# Patient Record
Sex: MALE | Race: WHITE | NOT HISPANIC OR LATINO | Employment: FULL TIME | URBAN - METROPOLITAN AREA
[De-identification: names, ages, dates, MRNs, and addresses within clinical notes are randomized per-mention and may not be internally consistent; named-entity substitution may affect disease eponyms.]

---

## 2017-05-06 ENCOUNTER — ALLSCRIPTS OFFICE VISIT (OUTPATIENT)
Dept: OTHER | Facility: OTHER | Age: 67
End: 2017-05-06

## 2017-05-06 DIAGNOSIS — M54.9 DORSALGIA: ICD-10-CM

## 2017-05-06 DIAGNOSIS — I10 ESSENTIAL (PRIMARY) HYPERTENSION: ICD-10-CM

## 2017-05-06 DIAGNOSIS — D64.9 ANEMIA: ICD-10-CM

## 2017-05-06 DIAGNOSIS — I51.7 CARDIOMEGALY: ICD-10-CM

## 2017-05-06 DIAGNOSIS — R07.9 CHEST PAIN: ICD-10-CM

## 2017-05-06 DIAGNOSIS — M54.10 RADICULOPATHY: ICD-10-CM

## 2017-05-06 DIAGNOSIS — R07.89 OTHER CHEST PAIN: ICD-10-CM

## 2017-05-06 DIAGNOSIS — R10.13 EPIGASTRIC PAIN: ICD-10-CM

## 2017-05-08 ENCOUNTER — LAB CONVERSION - ENCOUNTER (OUTPATIENT)
Dept: OTHER | Facility: OTHER | Age: 67
End: 2017-05-08

## 2017-05-08 LAB
A/G RATIO (HISTORICAL): 1.6 (CALC) (ref 1–2.5)
ALBUMIN SERPL BCP-MCNC: 4.2 G/DL (ref 3.6–5.1)
ALP SERPL-CCNC: 71 U/L (ref 40–115)
ALT SERPL W P-5'-P-CCNC: 34 U/L (ref 9–46)
AST SERPL W P-5'-P-CCNC: 23 U/L (ref 10–35)
BASOPHILS # BLD AUTO: 0.3 %
BASOPHILS # BLD AUTO: 12 CELLS/UL (ref 0–200)
BILIRUB SERPL-MCNC: 0.9 MG/DL (ref 0.2–1.2)
BUN SERPL-MCNC: 17 MG/DL (ref 7–25)
BUN/CREA RATIO (HISTORICAL): ABNORMAL (CALC) (ref 6–22)
CALCIUM SERPL-MCNC: 9 MG/DL (ref 8.6–10.3)
CHLORIDE SERPL-SCNC: 106 MMOL/L (ref 98–110)
CHOLEST SERPL-MCNC: 182 MG/DL (ref 125–200)
CHOLEST/HDLC SERPL: 4 (CALC)
CO2 SERPL-SCNC: 27 MMOL/L (ref 20–31)
CREAT SERPL-MCNC: 0.98 MG/DL (ref 0.7–1.25)
DEPRECATED RDW RBC AUTO: 13.8 % (ref 11–15)
EGFR AFRICAN AMERICAN (HISTORICAL): 91 ML/MIN/1.73M2
EGFR-AMERICAN CALC (HISTORICAL): 78 ML/MIN/1.73M2
EOSINOPHIL # BLD AUTO: 0.9 %
EOSINOPHIL # BLD AUTO: 37 CELLS/UL (ref 15–500)
GAMMA GLOBULIN (HISTORICAL): 2.7 G/DL (CALC) (ref 1.9–3.7)
GLUCOSE (HISTORICAL): 124 MG/DL (ref 65–99)
HBA1C MFR BLD HPLC: 5.4 % OF TOTAL HGB
HCT VFR BLD AUTO: 40.3 % (ref 38.5–50)
HDLC SERPL-MCNC: 45 MG/DL
HGB BLD-MCNC: 13.6 G/DL (ref 13.2–17.1)
LDL CHOLESTEROL (HISTORICAL): 122 MG/DL (CALC)
LIPASE SERPL-CCNC: 31 U/L (ref 7–60)
LYMPHOCYTES # BLD AUTO: 1644 CELLS/UL (ref 850–3900)
LYMPHOCYTES # BLD AUTO: 40.1 %
MCH RBC QN AUTO: 32 PG (ref 27–33)
MCHC RBC AUTO-ENTMCNC: 33.7 G/DL (ref 32–36)
MCV RBC AUTO: 94.9 FL (ref 80–100)
MONOCYTES # BLD AUTO: 344 CELLS/UL (ref 200–950)
MONOCYTES (HISTORICAL): 8.4 %
NEUTROPHILS # BLD AUTO: 2062 CELLS/UL (ref 1500–7800)
NEUTROPHILS # BLD AUTO: 50.3 %
NON-HDL-CHOL (CHOL-HDL) (HISTORICAL): 137 MG/DL (CALC)
PLATELET # BLD AUTO: 128 THOUSAND/UL (ref 140–400)
PLATELET # BLD AUTO: ABNORMAL 10*3/UL
PMV BLD AUTO: 9.9 FL (ref 7.5–12.5)
POTASSIUM SERPL-SCNC: 4.2 MMOL/L (ref 3.5–5.3)
RBC # BLD AUTO: 4.25 MILLION/UL (ref 4.2–5.8)
SODIUM SERPL-SCNC: 140 MMOL/L (ref 135–146)
TOTAL PROTEIN (HISTORICAL): 6.9 G/DL (ref 6.1–8.1)
TRIGL SERPL-MCNC: 73 MG/DL
TSH SERPL DL<=0.05 MIU/L-ACNC: 2.28 MIU/L (ref 0.4–4.5)
WBC # BLD AUTO: 4.1 THOUSAND/UL (ref 3.8–10.8)

## 2017-05-25 ENCOUNTER — GENERIC CONVERSION - ENCOUNTER (OUTPATIENT)
Dept: OTHER | Facility: OTHER | Age: 67
End: 2017-05-25

## 2017-05-25 ENCOUNTER — APPOINTMENT (OUTPATIENT)
Dept: PHYSICAL THERAPY | Facility: REHABILITATION | Age: 67
End: 2017-05-25
Payer: COMMERCIAL

## 2017-05-25 DIAGNOSIS — M54.10 RADICULOPATHY: ICD-10-CM

## 2017-05-25 DIAGNOSIS — M54.9 DORSALGIA: ICD-10-CM

## 2017-05-25 PROCEDURE — 97110 THERAPEUTIC EXERCISES: CPT

## 2017-05-25 PROCEDURE — 97162 PT EVAL MOD COMPLEX 30 MIN: CPT

## 2017-06-02 ENCOUNTER — TRANSCRIBE ORDERS (OUTPATIENT)
Dept: ADMINISTRATIVE | Facility: HOSPITAL | Age: 67
End: 2017-06-02

## 2017-06-02 ENCOUNTER — ALLSCRIPTS OFFICE VISIT (OUTPATIENT)
Dept: OTHER | Facility: OTHER | Age: 67
End: 2017-06-02

## 2017-06-02 ENCOUNTER — HOSPITAL ENCOUNTER (OUTPATIENT)
Dept: RADIOLOGY | Facility: HOSPITAL | Age: 67
Discharge: HOME | End: 2017-06-02
Payer: COMMERCIAL

## 2017-06-02 DIAGNOSIS — M54.10 RADICULOPATHY: ICD-10-CM

## 2017-06-02 DIAGNOSIS — M54.9 DORSALGIA: ICD-10-CM

## 2017-06-02 PROCEDURE — 72050 X-RAY EXAM NECK SPINE 4/5VWS: CPT

## 2017-06-05 ENCOUNTER — APPOINTMENT (OUTPATIENT)
Dept: PHYSICAL THERAPY | Facility: REHABILITATION | Age: 67
End: 2017-06-05
Payer: COMMERCIAL

## 2017-06-05 PROCEDURE — 97110 THERAPEUTIC EXERCISES: CPT

## 2017-06-06 ENCOUNTER — GENERIC CONVERSION - ENCOUNTER (OUTPATIENT)
Dept: OTHER | Facility: OTHER | Age: 67
End: 2017-06-06

## 2017-06-08 ENCOUNTER — APPOINTMENT (OUTPATIENT)
Dept: PHYSICAL THERAPY | Facility: REHABILITATION | Age: 67
End: 2017-06-08
Payer: COMMERCIAL

## 2017-06-08 PROCEDURE — 97110 THERAPEUTIC EXERCISES: CPT

## 2017-06-12 ENCOUNTER — APPOINTMENT (OUTPATIENT)
Dept: PHYSICAL THERAPY | Facility: REHABILITATION | Age: 67
End: 2017-06-12
Payer: COMMERCIAL

## 2017-06-12 PROCEDURE — 97110 THERAPEUTIC EXERCISES: CPT

## 2017-06-12 PROCEDURE — 97140 MANUAL THERAPY 1/> REGIONS: CPT

## 2017-06-15 ENCOUNTER — APPOINTMENT (OUTPATIENT)
Dept: PHYSICAL THERAPY | Facility: REHABILITATION | Age: 67
End: 2017-06-15
Payer: COMMERCIAL

## 2017-06-15 PROCEDURE — 97140 MANUAL THERAPY 1/> REGIONS: CPT

## 2017-06-15 PROCEDURE — 97110 THERAPEUTIC EXERCISES: CPT

## 2017-06-19 ENCOUNTER — APPOINTMENT (OUTPATIENT)
Dept: PHYSICAL THERAPY | Facility: REHABILITATION | Age: 67
End: 2017-06-19
Payer: COMMERCIAL

## 2017-06-19 PROCEDURE — 97110 THERAPEUTIC EXERCISES: CPT

## 2017-06-19 PROCEDURE — 97140 MANUAL THERAPY 1/> REGIONS: CPT

## 2017-06-20 ENCOUNTER — HOSPITAL ENCOUNTER (OUTPATIENT)
Dept: RADIOLOGY | Facility: HOSPITAL | Age: 67
Discharge: HOME | End: 2017-06-20
Payer: COMMERCIAL

## 2017-06-20 DIAGNOSIS — I51.7 CARDIOMEGALY: ICD-10-CM

## 2017-06-20 DIAGNOSIS — D64.9 ANEMIA: ICD-10-CM

## 2017-06-20 DIAGNOSIS — R07.9 CHEST PAIN: ICD-10-CM

## 2017-06-20 DIAGNOSIS — I10 ESSENTIAL (PRIMARY) HYPERTENSION: ICD-10-CM

## 2017-06-20 DIAGNOSIS — R07.89 OTHER CHEST PAIN: ICD-10-CM

## 2017-06-20 DIAGNOSIS — R10.13 EPIGASTRIC PAIN: ICD-10-CM

## 2017-06-20 PROCEDURE — 76706 US ABDL AORTA SCREEN AAA: CPT

## 2017-06-22 ENCOUNTER — APPOINTMENT (OUTPATIENT)
Dept: PHYSICAL THERAPY | Facility: REHABILITATION | Age: 67
End: 2017-06-22
Payer: COMMERCIAL

## 2017-06-22 PROCEDURE — 97110 THERAPEUTIC EXERCISES: CPT

## 2017-06-22 PROCEDURE — 97140 MANUAL THERAPY 1/> REGIONS: CPT

## 2017-06-23 ENCOUNTER — GENERIC CONVERSION - ENCOUNTER (OUTPATIENT)
Dept: OTHER | Facility: OTHER | Age: 67
End: 2017-06-23

## 2017-06-27 ENCOUNTER — GENERIC CONVERSION - ENCOUNTER (OUTPATIENT)
Dept: OTHER | Facility: OTHER | Age: 67
End: 2017-06-27

## 2017-06-29 ENCOUNTER — APPOINTMENT (OUTPATIENT)
Dept: PHYSICAL THERAPY | Facility: REHABILITATION | Age: 67
End: 2017-06-29
Payer: COMMERCIAL

## 2017-06-29 PROCEDURE — 97110 THERAPEUTIC EXERCISES: CPT

## 2017-06-29 PROCEDURE — 97112 NEUROMUSCULAR REEDUCATION: CPT

## 2017-06-29 PROCEDURE — 97140 MANUAL THERAPY 1/> REGIONS: CPT

## 2017-07-03 ENCOUNTER — APPOINTMENT (OUTPATIENT)
Dept: PHYSICAL THERAPY | Facility: REHABILITATION | Age: 67
End: 2017-07-03
Payer: COMMERCIAL

## 2017-07-03 ENCOUNTER — ALLSCRIPTS OFFICE VISIT (OUTPATIENT)
Dept: OTHER | Facility: OTHER | Age: 67
End: 2017-07-03

## 2017-07-03 PROCEDURE — 97110 THERAPEUTIC EXERCISES: CPT

## 2017-07-03 PROCEDURE — 97140 MANUAL THERAPY 1/> REGIONS: CPT

## 2017-07-05 ENCOUNTER — APPOINTMENT (OUTPATIENT)
Dept: PHYSICAL THERAPY | Facility: REHABILITATION | Age: 67
End: 2017-07-05
Payer: COMMERCIAL

## 2017-07-05 PROCEDURE — 97140 MANUAL THERAPY 1/> REGIONS: CPT

## 2017-07-05 PROCEDURE — 97110 THERAPEUTIC EXERCISES: CPT

## 2017-07-07 ENCOUNTER — GENERIC CONVERSION - ENCOUNTER (OUTPATIENT)
Dept: OTHER | Facility: OTHER | Age: 67
End: 2017-07-07

## 2017-08-23 ENCOUNTER — GENERIC CONVERSION - ENCOUNTER (OUTPATIENT)
Dept: OTHER | Facility: OTHER | Age: 67
End: 2017-08-23

## 2017-09-22 ENCOUNTER — GENERIC CONVERSION - ENCOUNTER (OUTPATIENT)
Dept: OTHER | Facility: OTHER | Age: 67
End: 2017-09-22

## 2017-10-27 ENCOUNTER — ALLSCRIPTS OFFICE VISIT (OUTPATIENT)
Dept: OTHER | Facility: OTHER | Age: 67
End: 2017-10-27

## 2017-10-27 ENCOUNTER — GENERIC CONVERSION - ENCOUNTER (OUTPATIENT)
Dept: OTHER | Facility: OTHER | Age: 67
End: 2017-10-27

## 2017-10-27 ENCOUNTER — GENERIC CONVERSION - ENCOUNTER (OUTPATIENT)
Dept: FAMILY MEDICINE CLINIC | Facility: CLINIC | Age: 67
End: 2017-10-27

## 2017-10-27 DIAGNOSIS — R73.01 IMPAIRED FASTING GLUCOSE: ICD-10-CM

## 2017-10-27 DIAGNOSIS — G47.9 SLEEP DISORDER: ICD-10-CM

## 2017-10-27 DIAGNOSIS — I10 ESSENTIAL (PRIMARY) HYPERTENSION: ICD-10-CM

## 2017-10-27 DIAGNOSIS — R06.81 APNEA, NOT ELSEWHERE CLASSIFIED: ICD-10-CM

## 2017-10-27 DIAGNOSIS — I44.7 LEFT BUNDLE-BRANCH BLOCK: ICD-10-CM

## 2017-10-28 ENCOUNTER — LAB CONVERSION - ENCOUNTER (OUTPATIENT)
Dept: OTHER | Facility: OTHER | Age: 67
End: 2017-10-28

## 2017-10-28 LAB
A/G RATIO (HISTORICAL): 1.8 (CALC) (ref 1–2.5)
ALBUMIN SERPL BCP-MCNC: 4.7 G/DL (ref 3.6–5.1)
ALP SERPL-CCNC: 70 U/L (ref 40–115)
ALT SERPL W P-5'-P-CCNC: 32 U/L (ref 9–46)
AST SERPL W P-5'-P-CCNC: 24 U/L (ref 10–35)
BASOPHILS # BLD AUTO: 0.3 %
BASOPHILS # BLD AUTO: 12 CELLS/UL (ref 0–200)
BILIRUB SERPL-MCNC: 0.8 MG/DL (ref 0.2–1.2)
BUN SERPL-MCNC: 11 MG/DL (ref 7–25)
BUN/CREA RATIO (HISTORICAL): ABNORMAL (CALC) (ref 6–22)
CALCIUM SERPL-MCNC: 9.1 MG/DL (ref 8.6–10.3)
CHLORIDE SERPL-SCNC: 105 MMOL/L (ref 98–110)
CHOLEST SERPL-MCNC: 113 MG/DL
CHOLEST/HDLC SERPL: 2.3 (CALC)
CO2 SERPL-SCNC: 27 MMOL/L (ref 20–31)
CREAT SERPL-MCNC: 0.83 MG/DL (ref 0.7–1.25)
DEPRECATED RDW RBC AUTO: 13.5 % (ref 11–15)
EGFR AFRICAN AMERICAN (HISTORICAL): 104 ML/MIN/1.73M2
EGFR-AMERICAN CALC (HISTORICAL): 90 ML/MIN/1.73M2
EOSINOPHIL # BLD AUTO: 1.3 %
EOSINOPHIL # BLD AUTO: 52 CELLS/UL (ref 15–500)
GAMMA GLOBULIN (HISTORICAL): 2.6 G/DL (CALC) (ref 1.9–3.7)
GLUCOSE (HISTORICAL): 146 MG/DL (ref 65–99)
HBA1C MFR BLD HPLC: 6.2 % OF TOTAL HGB
HCT VFR BLD AUTO: 38.5 % (ref 38.5–50)
HDLC SERPL-MCNC: 50 MG/DL
HGB BLD-MCNC: 13.2 G/DL (ref 13.2–17.1)
LDL CHOLESTEROL (HISTORICAL): 49 MG/DL (CALC)
LYMPHOCYTES # BLD AUTO: 1252 CELLS/UL (ref 850–3900)
LYMPHOCYTES # BLD AUTO: 31.3 %
MCH RBC QN AUTO: 31.8 PG (ref 27–33)
MCHC RBC AUTO-ENTMCNC: 34.3 G/DL (ref 32–36)
MCV RBC AUTO: 92.8 FL (ref 80–100)
MONOCYTES # BLD AUTO: 364 CELLS/UL (ref 200–950)
MONOCYTES (HISTORICAL): 9.1 %
NEUTROPHILS # BLD AUTO: 2320 CELLS/UL (ref 1500–7800)
NEUTROPHILS # BLD AUTO: 58 %
NON-HDL-CHOL (CHOL-HDL) (HISTORICAL): 63 MG/DL (CALC)
PLATELET # BLD AUTO: 143 THOUSAND/UL (ref 140–400)
PMV BLD AUTO: 10.8 FL (ref 7.5–12.5)
POTASSIUM SERPL-SCNC: 4.1 MMOL/L (ref 3.5–5.3)
PROSTATE SPECIFIC ANTIGEN TOTAL (HISTORICAL): 1.2 NG/ML
RBC # BLD AUTO: 4.15 MILLION/UL (ref 4.2–5.8)
SODIUM SERPL-SCNC: 140 MMOL/L (ref 135–146)
TOTAL PROTEIN (HISTORICAL): 7.3 G/DL (ref 6.1–8.1)
TRIGL SERPL-MCNC: 65 MG/DL
TSH SERPL DL<=0.05 MIU/L-ACNC: 2.78 MIU/L (ref 0.4–4.5)
WBC # BLD AUTO: 4 THOUSAND/UL (ref 3.8–10.8)

## 2017-11-30 ENCOUNTER — TRANSCRIBE ORDERS (OUTPATIENT)
Dept: ADMINISTRATIVE | Facility: HOSPITAL | Age: 67
End: 2017-11-30

## 2017-11-30 DIAGNOSIS — R06.02 SOB (SHORTNESS OF BREATH): ICD-10-CM

## 2017-11-30 DIAGNOSIS — R06.83 SNORING: Primary | ICD-10-CM

## 2017-12-01 ENCOUNTER — ALLSCRIPTS OFFICE VISIT (OUTPATIENT)
Dept: OTHER | Facility: OTHER | Age: 67
End: 2017-12-01

## 2017-12-05 NOTE — CONSULTS
Assessment    1  Sickle cell trait (282 5) (D57 3)    Plan  Sickle cell trait    · Follow-up PRN Evaluation and Treatment  Follow-up  Status: Complete  Done:[id number]   Ordered;Sickle cell trait; Ordered By: Renzo Santiago Performed:  Due: 68LHK0087    Discussion/Summary    Sofia Burgess is a 69year old gentleman of [facility] seen for initial consultation 2017 at the referral of Carlota Leblanc regarding history of sickle cell trait  his hemoglobin, white blood cell count and platelets have been stable since at least 2013  did review recommended cancer screening  The information from [employer] has been given  He is up-to-date  PSA has been evaluated and really and has consistently been 1 8 or lower since at least 2013  He reports colonoscopy in 2016 was normal and 10 year follow-up recommended  He is a never smoker  Does not drink alcohol  We did discuss that a diet and high in fruits and vegetables low in meat consumption and daily exercise are also recommended  at this time, we will see him on an as-needed basis  Should he have any issues or concerns, he is advised to call our office  History of Present Illness  Marlon Silvia Youngblood is a 69year old gentleman of [facility] seen for initial consultation 2017 at the referral of Carlota Leblanc regarding history of sickle cell trait  he was tested 3/20/1980 at [facility] and was found to have genotype AS (sickle cell trait)  He states his mother has genotype AS  his wife has normal hemoglobin-AA and that his children have been tested for sickle cell trait  also has concerns regarding cancer screening  His brother presented to hospital in [facility], had an MRI of his brain a states the following day lapsed into a coma and soon   Post mortem, Marlon states that his brother was diagnosed with multiple myeloma but he is suspicious of this diagnosis  has not had any new or persistent symptoms  He does follow with cardiologist at [address] re:LBBB and apnea  he states that his wife witnessed an apneic episode in he did have an episode of chest pain prompting evaluation  Patient denies alcohol use  He is a never smoker  He states that Dr Kim Lynch does perform screening PSA testing and that he had a colonoscopy 1 year ago and was advised to follow up at 10 years  10/27/2017 hemoglobin 13 2, MCV of 92 8, RDW 13 5, white blood cell count 4000 with 58% neutrophils, 31% lymphocytes, 9% monocytes  CMP 10/27/2017 was normal   CBC reviewed back through 4/27/2013  Platelet count has ranged from 120 is too high of 190  Hemoglobin has been stable 12 7-13 6  White blood cell count 3 5-4 8  Review of Systems   Constitutional: No fever or chills, feels well, no tiredness, no recent weight gain or weight loss  Eyes: No complaints of eye pain, no red eyes, no discharge from eyes, no itchy eyes  ENT: no complaints of earache, no hearing loss, no nosebleeds, no nasal discharge, no sore throat, no hoarseness  Cardiovascular: as noted in HPI  Respiratory: wheezing-- and-- shortness of breath during exertion, but-- no shortness of breath,-- no cough,-- no orthopnea-- and-- no PND  Gastrointestinal: No complaints of abdominal pain, no constipation, no nausea or vomiting, no diarrhea or bloody stools  Genitourinary: No complaints of dysuria, no incontinence, no hesitancy, no nocturia, no genital lesion, no testicular pain  Musculoskeletal: No complaints of arthralgia, no myalgias, no joint swelling or stiffness, no limb pain or swelling  Integumentary: No complaints of skin rash or skin lesions, no itching, no skin wound, no dry skin  Neurological: No compliants of headache, no confusion, no convulsions, no numbness or tingling, no dizziness or fainting, no limb weakness, no difficulty walking    Psychiatric: Is not suicidal, no sleep disturbances, no anxiety or depression, no change in personality, no emotional problems  Endocrine: No complaints of proptosis, no hot flashes, no muscle weakness, no erectile dysfunction, no deepening of the voice, no feelings of weakness  Hematologic/Lymphatic: No complaints of swollen glands, no swollen glands in the neck, does not bleed easily, no easy bruising  Active Problems  1  Allergic dermatitis (692 9) (L23 9)   2  Anemia (285 9) (D64 9)   3  Arthralgia of knee, left (719 46) (M25 562)   4  Backache (724 5) (M54 9)   5  Benign enlargement of prostate (600 00) (N40 0)   6  Benign essential hypertension (401 1) (I10)   7  Complete left bundle branch block (LBBB) (426 3) (I44 7)   8  Encounter for prostate cancer screening (V76 44) (Z12 5)   9  Epigastric pain (789 06) (R10 13)   10  Fatigue (780 79) (R53 83)   11  Headache (784 0) (R51)   12  Impaired fasting glucose (790 21) (R73 01)   13  Insufficient sleep syndrome (307 44) (F51 12)   14  LVH (left ventricular hypertrophy) (429 3) (I51 7)   15  Need for pneumococcal vaccine (V03 82) (Z23)   16  Need for prophylactic vaccination and inoculation against influenza (V04 81) (Z23)   17  Pain in joint of left shoulder (719 41) (M25 512)   18  Radiculopathy affecting upper extremity (723 4) (M54 10)   19  Sickle cell trait (282 5) (D57 3)   20  Sleep disorder (780 50) (G47 9)   21  Upper back pain (724 5) (M54 9)   22  Upper respiratory infection (465 9) (J06 9)   23  Witnessed episode of apnea (786 03) (R06 81)    Past Medical History    1  History of Atypical chest pain (786 59) (R07 89)   2  History of anemia (V12 3) (Z86 2)   3  History of sickle cell trait (V12 3) (Z86 2)   4  History of Need for prophylactic vaccination and inoculation against influenza (V04 81) (Z23)   5  History of Reported Prostate Antigen Blood Test   6  History of Spinal stenosis (724 00) (M48 00)    The active problems and past medical history were reviewed and updated today  Surgical History  1   History of Colonoscopy (Fiberoptic)   2  History of Lower Back Surgery    The surgical history was reviewed and updated today  Family History  Father    1  Family history of Diabetes Mellitus (V18 0)  Family History    2  Family history of Sickle-cell anemia    The family history was reviewed and updated today  Social History     · Marital History - Currently    · Never A Smoker  The social history was reviewed and updated today  The social history was reviewed and is unchanged  Current Meds   1  AmLODIPine Besylate 10 MG Oral Tablet; Take 1 tablet daily; Therapy: 21JNI8582 to (Evaluate:28Jun2018) Recorded   2  Aspirin 81 MG Oral Tablet Chewable; CHEW AND SWALLOW 1 TABLET DAILY; Therapy: 72MPV4711 to (Evaluate:28Jun2018) Recorded   3  HydroCHLOROthiazide 12 5 MG Oral Capsule; take 1 capsule by mouth once daily; Therapy: 52PUH9872 to 26 [id number])  Requested for: 27Oct2017; Last Rx:27Oct2017 Ordered   4  Olmesartan Medoxomil 40 MG Oral Tablet; Take 1 tablet daily; Therapy: 58CFW7204 to Recorded   5  Rosuvastatin Calcium 20 MG Oral Tablet; take one tablet by mouth daily; Therapy: 72TJH5560 to Recorded    The medication list was reviewed and updated today  Allergies    1  Levaquin TABS    Physical Exam   Constitutional  General appearance: No acute distress, well appearing and well nourished  Eyes  Conjunctiva and lids: No swelling, erythema, or discharge  Pupils and irises: Equal, round and reactive to light  Ears, Nose, Mouth, and Throat  External inspection of ears and nose: Normal    Nasal mucosa, septum, and turbinates: Normal without edema or erythema  Oropharynx: Normal with no erythema, edema, exudate or lesions  Pulmonary  Respiratory effort: No increased work of breathing or signs of respiratory distress  Auscultation of lungs: Clear to auscultation, equal breath sounds bilaterally, no wheezes, no rales, no rhonci     Cardiovascular  Palpation of heart: Normal PMI, no thrills  Examination of extremities for edema and/or varicosities: Normal    Abdomen  Abdomen: Non-tender, no masses  Musculoskeletal  Gait and station: Normal    Digits and nails: Normal without clubbing or cyanosis  Skin  Skin and subcutaneous tissue: Normal without rashes or lesions  Psychiatric  Orientation to person, place and time: Normal    Mood and affect: Normal         ECOG 0       Future Appointments    Date/Time Provider Specialty Site   12/15/2017 09:00 AM LOBO Matthews   Family Medicine [address] [place] [address]       Signatures   Electronically signed by : Kristina Boland, [facility]; Dec  1 2017 10:12AM EST                       (Author)    Electronically signed by : LOBO Mc ; Dec  1 2017  1:23PM EST

## 2017-12-07 ENCOUNTER — TRANSCRIBE ORDERS (OUTPATIENT)
Dept: SLEEP CENTER | Facility: CLINIC | Age: 67
End: 2017-12-07

## 2017-12-07 DIAGNOSIS — G47.33 OSA (OBSTRUCTIVE SLEEP APNEA): Primary | ICD-10-CM

## 2017-12-21 ENCOUNTER — GENERIC CONVERSION - ENCOUNTER (OUTPATIENT)
Dept: OTHER | Facility: OTHER | Age: 67
End: 2017-12-21

## 2018-01-12 VITALS
TEMPERATURE: 96.5 F | HEART RATE: 60 BPM | SYSTOLIC BLOOD PRESSURE: 142 MMHG | DIASTOLIC BLOOD PRESSURE: 70 MMHG | BODY MASS INDEX: 31.31 KG/M2 | WEIGHT: 199.5 LBS | HEIGHT: 67 IN | RESPIRATION RATE: 16 BRPM

## 2018-01-13 NOTE — PROGRESS NOTES
Chief Complaint  Nurse visit for BP check      Active Problems    1  Anemia (285 9) (D64 9)   2  Arthralgia of knee, left (719 46) (M25 562)   3  Atypical chest pain (786 59) (R07 89)   4  Backache (724 5) (M54 9)   5  Benign enlargement of prostate (600 00) (N40 0)   6  Benign essential hypertension (401 1) (I10)   7  Chest pain (786 50) (R07 9)   8  Fatigue (780 79) (R53 83)   9  Headache (784 0) (R51)   10  Impaired fasting glucose (790 21) (R73 01)   11  LVH (left ventricular hypertrophy) (429 3) (I51 7)   12  Need for pneumococcal vaccine (V03 82) (Z23)   13  Need for prophylactic vaccination and inoculation against influenza (V04 81) (Z23)   14  Pain in joint of left shoulder (719 41) (M25 512)   15  Upper respiratory infection (465 9) (J06 9)    Current Meds   1  Kavita 5-40 MG Oral Tablet; TAKE 1 TABLET ONCE DAILY; Therapy: 03Apr2014 to (Kellee Clement)  Requested for: 63JMW4691; Last   Rx:14Oct2016 Ordered   2  TiZANidine HCl - 2 MG Oral Tablet; Take 1-2 tabs at bedtime as needed for back spasm; Therapy: 51ROX7499 to (Last Rx:14Oct2016)  Requested for: 14Oct2016 Ordered    Allergies    1  Levaquin TABS    Vitals  Signs    Systolic: 635  Diastolic: 62    Plan  Benign essential hypertension    · Hypertension follow up Evaluation and Treatment  Follow-up  Status: Hold For -  Scheduling,Retrospective Authorization  Requested for: 21Oct2016    Signatures   Electronically signed by :  LOBO Armando ; Oct 26 2016  3:15PM EST                       (Author)

## 2018-01-14 VITALS
HEIGHT: 67 IN | RESPIRATION RATE: 16 BRPM | TEMPERATURE: 98.4 F | HEART RATE: 68 BPM | WEIGHT: 196 LBS | SYSTOLIC BLOOD PRESSURE: 110 MMHG | BODY MASS INDEX: 30.76 KG/M2 | DIASTOLIC BLOOD PRESSURE: 62 MMHG

## 2018-01-15 VITALS
HEIGHT: 65 IN | TEMPERATURE: 96.8 F | SYSTOLIC BLOOD PRESSURE: 132 MMHG | HEART RATE: 74 BPM | BODY MASS INDEX: 32.9 KG/M2 | WEIGHT: 197.5 LBS | DIASTOLIC BLOOD PRESSURE: 74 MMHG | RESPIRATION RATE: 16 BRPM

## 2018-01-15 NOTE — RESULT NOTES
Verified Results  * XR SPINE CERVICAL COMPLETE 4 OR 5 VW NON INJURY 02Jun2017 09:10AM Luella Dakin Order Number: [id number]     Test Name Result Flag Reference   XR SPINE CERVICAL COMPLETE 4 OR 5 VW (Report)     CERVICAL SPINE     INDICATION: Posterior neck pain radiating into the arms bilaterally for one month  Numbness  COMPARISON: None     VIEWS: 5     IMAGES: 6     FINDINGS:     No evidence of fracture or subluxation  Mild degenerative disc space narrowing and minimal marginal osteophyte formation seen at C5-6  Mild uncovertebral hypertrophy, greatest on the LEFT at C5-6 with associated mild neural foraminal narrowing on the LEFT  Neural foramen otherwise patent  The prevertebral soft tissues are within normal limits  The lung apices are intact  IMPRESSION:     Mild degenerative disc disease at C5-6 as described  Mild normal foraminal narrowing at C5 on the LEFT         Workstation performed: [id number]     Signed by:   Georgiana Medrano MD   6/3/17

## 2018-01-15 NOTE — PROGRESS NOTES
Chief Complaint  Nurse visit for BP check      Active Problems    1  Anemia (285 9) (D64 9)   2  Arthralgia of knee, left (719 46) (M25 562)   3  Atypical chest pain (786 59) (R07 89)   4  Backache (724 5) (M54 9)   5  Benign enlargement of prostate (600 00) (N40 0)   6  Benign essential hypertension (401 1) (I10)   7  Chest pain (786 50) (R07 9)   8  Fatigue (780 79) (R53 83)   9  Headache (784 0) (R51)   10  Impaired fasting glucose (790 21) (R73 01)   11  LVH (left ventricular hypertrophy) (429 3) (I51 7)   12  Need for pneumococcal vaccine (V03 82) (Z23)   13  Need for prophylactic vaccination and inoculation against influenza (V04 81) (Z23)   14  Pain in joint of left shoulder (719 41) (M25 512)   15  Upper respiratory infection (465 9) (J06 9)    Current Meds   1  Kavita 5-40 MG Oral Tablet; TAKE 1 TABLET ONCE DAILY; Therapy: 02Dah7900 to (Evaluate:83Mvi0205)  Requested for: 04COM4972; Last   Rx:21Ayl0165 Ordered   2  TiZANidine HCl - 2 MG Oral Tablet; Take 1-2 tabs at bedtime as needed for back spasm; Therapy: 98SQJ5068 to (Last Rx:78Ifn4893)  Requested for: 14Oct2016 Ordered    Allergies    1  Levaquin TABS    Vitals  Signs    Systolic: 004  Diastolic: 62    Signatures   Electronically signed by :  LOBO Chiu Asa ; Nov 15 2016  8:24AM EST                       (Author)

## 2018-01-22 VITALS
WEIGHT: 199.5 LBS | HEIGHT: 67 IN | HEART RATE: 64 BPM | DIASTOLIC BLOOD PRESSURE: 56 MMHG | BODY MASS INDEX: 31.31 KG/M2 | TEMPERATURE: 95.9 F | SYSTOLIC BLOOD PRESSURE: 120 MMHG

## 2018-01-22 VITALS — DIASTOLIC BLOOD PRESSURE: 70 MMHG | SYSTOLIC BLOOD PRESSURE: 138 MMHG

## 2018-01-23 NOTE — CONSULTS
History of Present Illness  Marlon Nuñez is a 69year old gentleman of [facility] seen for initial consultation 2017 at the referral of [place] regarding history of sickle cell trait  he was tested 3[birth year]1980 at [facility] and was found to have genotype AS (sickle cell trait)  He states his mother has genotype AS  his wife has normal hemoglobin-AA and that his children have been tested for sickle cell trait  He also has concerns regarding cancer screening  His brother presented to hospital in [facility], had an MRI of his brain a states the following day lapsed into a coma and soon   Post mortem, Marlon states that his brother was diagnosed with multiple myeloma but he is suspicious of this diagnosis  Marlon has not had any new or persistent symptoms  He does follow with cardiologist at [address] [place] [address] re:LBBB and apnea  he states that his wife witnessed an apneic episode in he did have an episode of chest pain prompting evaluation  Patient denies alcohol use  He is a never smoker  He states that Dr Rima Pratt does perform screening PSA testing and that he had a colonoscopy 1 year ago and was advised to follow up at 10 years  10/27/2017 hemoglobin 13 2, MCV of 92 8, RDW 13 5, white blood cell count 4000 with 58% neutrophils, 31% lymphocytes, 9% monocytes  CMP 10/27/2017 was normal    CBC reviewed back through 2013  Platelet count has ranged from 120 is too high of 190  Hemoglobin has been stable 12 7-13 6  White blood cell count 3 5-4 8  Review of Systems    Constitutional: No fever or chills, feels well, no tiredness, no recent weight gain or weight loss  Eyes: No complaints of eye pain, no red eyes, no discharge from eyes, no itchy eyes  ENT: no complaints of earache, no hearing loss, no nosebleeds, no nasal discharge, no sore throat, no hoarseness  Cardiovascular: as noted in HPI     Respiratory: wheezing and shortness of breath during exertion, but no shortness of breath, no cough, no orthopnea and no PND  Gastrointestinal: No complaints of abdominal pain, no constipation, no nausea or vomiting, no diarrhea or bloody stools  Genitourinary: No complaints of dysuria, no incontinence, no hesitancy, no nocturia, no genital lesion, no testicular pain  Musculoskeletal: No complaints of arthralgia, no myalgias, no joint swelling or stiffness, no limb pain or swelling  Integumentary: No complaints of skin rash or skin lesions, no itching, no skin wound, no dry skin  Neurological: No compliants of headache, no confusion, no convulsions, no numbness or tingling, no dizziness or fainting, no limb weakness, no difficulty walking  Psychiatric: Is not suicidal, no sleep disturbances, no anxiety or depression, no change in personality, no emotional problems  Endocrine: No complaints of proptosis, no hot flashes, no muscle weakness, no erectile dysfunction, no deepening of the voice, no feelings of weakness  Hematologic/Lymphatic: No complaints of swollen glands, no swollen glands in the neck, does not bleed easily, no easy bruising  Active Problems   1  Allergic dermatitis (692 9) (L23 9)  2  Anemia (285 9) (D64 9)  3  Arthralgia of knee, left (719 46) (M25 562)  4  Backache (724 5) (M54 9)  5  Benign enlargement of prostate (600 00) (N40 0)  6  Benign essential hypertension (401 1) (I10)  7  Complete left bundle branch block (LBBB) (426 3) (I44 7)  8  Encounter for prostate cancer screening (V76 44) (Z12 5)  9  Epigastric pain (789 06) (R10 13)  10  Fatigue (780 79) (R53 83)  11  Headache (784 0) (R51)  12  Impaired fasting glucose (790 21) (R73 01)  13  Insufficient sleep syndrome (307 44) (F51 12)  14  LVH (left ventricular hypertrophy) (429 3) (I51 7)  15  Need for pneumococcal vaccine (V03 82) (Z23)  16  Need for prophylactic vaccination and inoculation against influenza (V04 81) (Z23)  17   Pain in joint of left shoulder (719 41) (M25 512)  18  Radiculopathy affecting upper extremity (723 4) (M54 10)  19  Sickle cell trait (282 5) (D57 3)  20  Sleep disorder (780 50) (G47 9)  21  Upper back pain (724 5) (M54 9)  22  Upper respiratory infection (465 9) (J06 9)  23  Witnessed episode of apnea (786 03) (R06 81)    Past Medical History    · History of Atypical chest pain (786 59) (R07 89)   · History of anemia (V12 3) (Z86 2)   · History of sickle cell trait (V12 3) (Z86 2)   · History of Need for prophylactic vaccination and inoculation against influenza (V04 81)  (Z23)   · History of Reported Prostate Antigen Blood Test   · History of Spinal stenosis (724 00) (M48 00)    The active problems and past medical history were reviewed and updated today  Surgical History    · History of Colonoscopy (Fiberoptic)   · History of Lower Back Surgery    The surgical history was reviewed and updated today  Family History    · Family history of Diabetes Mellitus (V18 0)    · Family history of Sickle-cell anemia    The family history was reviewed and updated today  Social History    · Marital History - Currently    · Never A Smoker  The social history was reviewed and updated today  The social history was reviewed and is unchanged  Current Meds  1  AmLODIPine Besylate 10 MG Oral Tablet; Take 1 tablet daily; Therapy: 56LHE1669 to (Evaluate:28Jun2018) Recorded  2  Aspirin 81 MG Oral Tablet Chewable; CHEW AND SWALLOW 1 TABLET DAILY; Therapy: 25LVB9675 to (Evaluate:28Jun2018) Recorded  3  HydroCHLOROthiazide 12 5 MG Oral Capsule; take 1 capsule by mouth once daily; Therapy: 72ZVG6336 to [id number])  Requested for: 27Oct2017; Last   Rx:27Oct2017 Ordered  4  Olmesartan Medoxomil 40 MG Oral Tablet; Take 1 tablet daily; Therapy: 25QOP8204 to Recorded  5  Rosuvastatin Calcium 20 MG Oral Tablet; take one tablet by mouth daily; Therapy: 81HFM6127 to Recorded    The medication list was reviewed and updated today  Allergies   1  Levaquin TABS    Physical Exam    Constitutional   General appearance: No acute distress, well appearing and well nourished  Eyes   Conjunctiva and lids: No swelling, erythema, or discharge  Pupils and irises: Equal, round and reactive to light  Ears, Nose, Mouth, and Throat   External inspection of ears and nose: Normal     Nasal mucosa, septum, and turbinates: Normal without edema or erythema  Oropharynx: Normal with no erythema, edema, exudate or lesions  Pulmonary   Respiratory effort: No increased work of breathing or signs of respiratory distress  Auscultation of lungs: Clear to auscultation, equal breath sounds bilaterally, no wheezes, no rales, no rhonci  Cardiovascular   Palpation of heart: Normal PMI, no thrills  Examination of extremities for edema and/or varicosities: Normal     Abdomen   Abdomen: Non-tender, no masses  Musculoskeletal   Gait and station: Normal     Digits and nails: Normal without clubbing or cyanosis  Skin   Skin and subcutaneous tissue: Normal without rashes or lesions  Psychiatric   Orientation to person, place and time: Normal     Mood and affect: Normal         ECOG 0       Assessment   1  Sickle cell trait (282 5) (D57 3)    Plan  Sickle cell trait    · Follow-up PRN Evaluation and Treatment  Follow-up  Status: Complete  Done:  12WIA4181  Ordered; For: Sickle cell trait; Ordered By: Anthony Paz  Performed:   Due:   84WHU5947    Discussion/Summary    Andres Sloan is a 69year old gentleman of [facility] seen for initial consultation 12/1/2017 at the referral of Regina Damon regarding history of sickle cell trait  his hemoglobin, white blood cell count and platelets have been stable since at least April 2013  We did review recommended cancer screening  The information from [employer] has been given  He is up-to-date   PSA has been evaluated and really and has consistently been 1 8 or lower since at least April 2013  He reports colonoscopy in 2016 was normal and 10 year follow-up recommended  He is a never smoker  Does not drink alcohol  We did discuss that a diet and high in fruits and vegetables low in meat consumption and daily exercise are also recommended  at this time, we will see him on an as-needed basis  Should he have any issues or concerns, he is advised to call our office        Signatures   Electronically signed by : Laisha Burgos, [facility]; Dec  1 2017 10:12AM EST                       (Author)    Electronically signed by : LOBO Blue ; Dec  1 2017  1:23PM EST

## 2018-01-24 VITALS
BODY MASS INDEX: 31.29 KG/M2 | HEART RATE: 70 BPM | WEIGHT: 199.38 LBS | SYSTOLIC BLOOD PRESSURE: 148 MMHG | TEMPERATURE: 96.3 F | HEIGHT: 67 IN | DIASTOLIC BLOOD PRESSURE: 70 MMHG

## 2018-01-24 VITALS — DIASTOLIC BLOOD PRESSURE: 60 MMHG | SYSTOLIC BLOOD PRESSURE: 118 MMHG

## 2018-03-02 DIAGNOSIS — R73.01 IMPAIRED FASTING GLUCOSE: ICD-10-CM

## 2018-03-02 DIAGNOSIS — I51.7 CARDIOMEGALY: ICD-10-CM

## 2018-03-02 DIAGNOSIS — I10 ESSENTIAL (PRIMARY) HYPERTENSION: ICD-10-CM

## 2018-05-08 DIAGNOSIS — L30.9 DERMATITIS: Primary | ICD-10-CM

## 2018-05-08 RX ORDER — CLOBETASOL PROPIONATE 0.5 MG/G
CREAM TOPICAL
Qty: 45 G | Refills: 0 | Status: SHIPPED | OUTPATIENT
Start: 2018-05-08 | End: 2018-06-29

## 2018-06-29 ENCOUNTER — OFFICE VISIT (OUTPATIENT)
Dept: FAMILY MEDICINE CLINIC | Facility: CLINIC | Age: 68
End: 2018-06-29
Payer: COMMERCIAL

## 2018-06-29 VITALS
BODY MASS INDEX: 32.96 KG/M2 | HEART RATE: 68 BPM | TEMPERATURE: 97.7 F | SYSTOLIC BLOOD PRESSURE: 130 MMHG | HEIGHT: 65 IN | RESPIRATION RATE: 16 BRPM | DIASTOLIC BLOOD PRESSURE: 60 MMHG | WEIGHT: 197.8 LBS

## 2018-06-29 DIAGNOSIS — J02.9 PHARYNGITIS, UNSPECIFIED ETIOLOGY: ICD-10-CM

## 2018-06-29 DIAGNOSIS — I10 BENIGN ESSENTIAL HYPERTENSION: ICD-10-CM

## 2018-06-29 DIAGNOSIS — I10 ESSENTIAL HYPERTENSION: Primary | ICD-10-CM

## 2018-06-29 PROBLEM — D57.3 SICKLE CELL TRAIT (HCC): Status: ACTIVE | Noted: 2017-10-27

## 2018-06-29 PROBLEM — M48.00 SPINAL STENOSIS: Status: ACTIVE | Noted: 2017-05-31

## 2018-06-29 PROBLEM — M54.10 RADICULOPATHY AFFECTING UPPER EXTREMITY: Status: ACTIVE | Noted: 2017-05-06

## 2018-06-29 PROBLEM — I44.7 COMPLETE LEFT BUNDLE BRANCH BLOCK (LBBB): Status: ACTIVE | Noted: 2017-09-21

## 2018-06-29 PROCEDURE — 99214 OFFICE O/P EST MOD 30 MIN: CPT | Performed by: FAMILY MEDICINE

## 2018-06-29 RX ORDER — OLMESARTAN MEDOXOMIL 40 MG/1
1 TABLET ORAL DAILY
COMMUNITY
Start: 2017-07-03 | End: 2018-06-29 | Stop reason: SDUPTHER

## 2018-06-29 RX ORDER — HYDROCHLOROTHIAZIDE 12.5 MG/1
1 CAPSULE, GELATIN COATED ORAL DAILY
COMMUNITY
Start: 2017-10-27 | End: 2018-06-29

## 2018-06-29 RX ORDER — OLMESARTAN MEDOXOMIL 40 MG/1
40 TABLET ORAL DAILY
Qty: 90 TABLET | Refills: 3 | Status: SHIPPED | OUTPATIENT
Start: 2018-06-29 | End: 2019-09-03

## 2018-06-29 RX ORDER — ASPIRIN 81 MG/1
1 TABLET, CHEWABLE ORAL DAILY
COMMUNITY
Start: 2017-07-03

## 2018-06-29 RX ORDER — AZITHROMYCIN 250 MG/1
TABLET, FILM COATED ORAL
Qty: 6 TABLET | Refills: 0 | Status: SHIPPED | OUTPATIENT
Start: 2018-06-29 | End: 2018-07-04

## 2018-06-29 RX ORDER — ROSUVASTATIN CALCIUM 20 MG/1
1 TABLET, COATED ORAL DAILY
COMMUNITY
Start: 2017-07-03 | End: 2019-11-21 | Stop reason: SDUPTHER

## 2018-06-29 RX ORDER — AMLODIPINE BESYLATE 5 MG/1
5 TABLET ORAL DAILY
Qty: 90 TABLET | Refills: 3 | Status: SHIPPED | OUTPATIENT
Start: 2018-06-29 | End: 2018-09-11

## 2018-06-29 RX ORDER — AMLODIPINE BESYLATE 10 MG/1
1 TABLET ORAL DAILY
COMMUNITY
Start: 2017-07-03 | End: 2018-06-29 | Stop reason: SDUPTHER

## 2018-06-29 RX ORDER — HYDROCHLOROTHIAZIDE 25 MG/1
25 TABLET ORAL DAILY
Qty: 90 TABLET | Refills: 3 | Status: SHIPPED | OUTPATIENT
Start: 2018-06-29 | End: 2018-12-07

## 2018-06-29 NOTE — PROGRESS NOTES
FAMILY PRACTICE OFFICE VISIT       NAME: Marlon Lechuga  AGE: 77 y o  SEX: male       : 1950        MRN: [id number]    DATE: 2018  TIME: 10:06 AM    Assessment and Plan     Problem List Items Addressed This Visit     Benign essential hypertension     Hypertension  I had a long discussion with the patient regarding treatment options for his edema and hypertension  We will add hydrochlorothiazide 25 mg daily and decrease his amlodipine to 5 mg daily  He will continue with his current dose of olmesartan 40 mg  He will follow up with his cardiologist 2018         Relevant Medications    amLODIPine (NORVASC) 5 mg tablet    olmesartan (BENICAR) 40 mg tablet    hydrochlorothiazide (HYDRODIURIL) 25 mg tablet    Pharyngitis    Relevant Medications    azithromycin (ZITHROMAX) 250 mg tablet      Other Visit Diagnoses     Essential hypertension    -  Primary    Relevant Medications    amLODIPine (NORVASC) 5 mg tablet    olmesartan (BENICAR) 40 mg tablet    hydrochlorothiazide (HYDRODIURIL) 25 mg tablet        Patient was also given prescription for Zithromax Z-Jet take as directed for 5 days  He will call if symptoms persist after 1 week  There are no Patient Instructions on file for this visit  Chief Complaint     Chief Complaint   Patient presents with    Sore Throat     Pt is here for left sided sore throat  3 + days       History of Present Illness     Patient states symptoms began with significant sore throat with swallowing approximately 5 days ago  He denies any other symptoms such as cough or fever  Patient also reported chronic peripheral edema which she feels is from his amlodipine medication  He did see his cardiologist at [facility] who has the patient on almost start 40 mg, amlodipine 10 mg  His family physician initially tried hydrochlorothiazide 12 5 mg however patient states he did not see any significant improvement    He denies any chest pain or shortness of breath  Review of Systems   Review of Systems   Constitutional: Negative  HENT:        As per HPI   Respiratory: Negative  Cardiovascular: Negative  Gastrointestinal: Negative  Genitourinary: Negative  Musculoskeletal: Negative  Skin:        As per HPI       Active Problem List     Patient Active Problem List   Diagnosis    Anemia    Arthralgia of knee, left    Benign enlargement of prostate    Benign essential hypertension    Complete left bundle branch block (LBBB)    Radiculopathy affecting upper extremity    Sickle cell trait ([address] )    Spinal stenosis    Pharyngitis       Past Medical History:  No past medical history on file  Past Surgical History:  No past surgical history on file  Family History:  No family history on file  Social History:  Social History     Social History    Marital status: /Civil Union     Spouse name: N/A    Number of children: N/A    Years of education: N/A     Occupational History    Not on file  Social History Main Topics    Smoking status: Never Smoker    Smokeless tobacco: Never Used    Alcohol use No    Drug use: No    Sexual activity: Not on file     Other Topics Concern    Not on file     Social History Narrative    No narrative on file       Objective     Vitals:    06/29/18 0912   BP: 130/60   Pulse: 68   Resp: 16   Temp: 97 7 °F (36 5 °C)     Wt Readings from Last 3 Encounters:   06/29/18 89 7 kg (197 lb 12 8 oz)   12/21/17 90 4 kg (199 lb 6 1 oz)   10/27/17 90 5 kg (199 lb 8 oz)       Physical Exam   Constitutional: No distress  HENT:   Mouth/Throat: No oropharyngeal exudate  Tympanic membranes within normal limits bilaterally  Minimal posterior pharynx redness with negative exudates   Neck:   No carotid bruits   Cardiovascular:   Regular rate and rhythm with no murmurs   Pulmonary/Chest:   Lungs are clear to auscultation without wheezes,rales, or rhonchi   Musculoskeletal: He exhibits edema  Plus one bilateral peripheral edema of the bilateral lower extremities  Negative Homans sign   Lymphadenopathy:     He has no cervical adenopathy  Skin: No rash noted  Pertinent Laboratory/Diagnostic Studies:  Lab Results   Component Value Date    BUN 11 10/27/2017    CREATININE 0 83 10/27/2017    CALCIUM 9 1 10/27/2017     10/27/2017    K 4 1 10/27/2017    CO2 27 10/27/2017     10/27/2017     Lab Results   Component Value Date    ALT 32 10/27/2017    AST 24 10/27/2017    ALKPHOS 70 10/27/2017    BILITOT 0 8 10/27/2017       Lab Results   Component Value Date    WBC 4 0 10/27/2017    HGB 13 2 10/27/2017    HCT 38 5 10/27/2017    MCV 92 8 10/27/2017     10/27/2017       No results found for: TSH    Lab Results   Component Value Date    CHOL 113 10/27/2017     Lab Results   Component Value Date    TRIG 65 10/27/2017     Lab Results   Component Value Date    HDL 50 10/27/2017     No results found for: [address]  Lab Results   Component Value Date    HGBA1C 6 2 (H) 10/27/2017       Results for orders placed or performed in visit on 10/28/17   HEMOGLOBIN A1C (HISTORICAL)   Result Value Ref Range    Hemoglobin A1C 6 2 (H) <5 7 % of total Hgb   TSH, 3RD GENERATION (HISTORICAL)   Result Value Ref Range    TSH 3RD GENERATON 2 78 0 40 - 4 50 mIU/L   LIPID PANEL WITH DIRECT LDL REFLEX (HISTORICAL)   Result Value Ref Range    Cholesterol 113 <200 mg/dL    HDL 50 >40 mg/dL    Triglycerides 65 <150 mg/dL    LDL CHOLESTEROL 49 mg/dL (calc)    Chol/HDL Ratio 2 3 <5 0 (calc)    NON-HDL-CHOL (CHOL-HDL) 63 <130 mg/dL (calc)       No orders of the defined types were placed in this encounter        ALLERGIES:  Allergies   Allergen Reactions    Levofloxacin      Other reaction(s): Unknown Allergic Reaction    Amlodipine Rash       Current Medications     Current Outpatient Prescriptions   Medication Sig Dispense Refill    amLODIPine (NORVASC) 5 mg tablet Take 1 tablet (5 mg total) by mouth daily 90 tablet 3  aspirin 81 mg chewable tablet Chew 1 tablet daily      olmesartan (BENICAR) 40 mg tablet Take 1 tablet (40 mg total) by mouth daily 90 tablet 3    rosuvastatin (CRESTOR) 20 MG tablet Take 1 tablet by mouth daily      azithromycin (ZITHROMAX) 250 mg tablet Take 2 tablets today then 1 tablet daily x 4 days 6 tablet 0    hydrochlorothiazide (HYDRODIURIL) 25 mg tablet Take 1 tablet (25 mg total) by mouth daily 90 tablet 3     No current facility-administered medications for this visit            Health Maintenance     Health Maintenance   Topic Date Due    Hepatitis C Screening  1950    Depression Screening PHQ-9  1950    CRC Screening: Colonoscopy  1950    DTaP,Tdap,and Td Vaccines (1 - Tdap) 03/08/1969    Fall Risk  03/08/2013    GLAUCOMA SCREENING 67+ YR  03/08/2015    INFLUENZA VACCINE  09/01/2018    PNEUMOCOCCAL POLYSACCHARIDE VACCINE AGE 65 AND OVER  Completed     Immunization History   Administered Date(s) Administered    Influenza Split High Dose Preservative Free IM 10/14/2016, 10/27/2017    Influenza TIV (IM) 10/12/2007, 09/25/2009, 01/17/2014, 10/22/2014    Pneumococcal Conjugate 13-Valent 10/14/2016    Pneumococcal Polysaccharide PPV23 09/42/2704       Chloe Brody MD

## 2018-06-29 NOTE — ASSESSMENT & PLAN NOTE
Hypertension  I had a long discussion with the patient regarding treatment options for his edema and hypertension  We will add hydrochlorothiazide 25 mg daily and decrease his amlodipine to 5 mg daily  He will continue with his current dose of olmesartan 40 mg    He will follow up with his cardiologist September 2018

## 2018-09-11 ENCOUNTER — OFFICE VISIT (OUTPATIENT)
Dept: FAMILY MEDICINE CLINIC | Facility: CLINIC | Age: 68
End: 2018-09-11
Payer: COMMERCIAL

## 2018-09-11 ENCOUNTER — APPOINTMENT (EMERGENCY)
Dept: RADIOLOGY | Facility: HOSPITAL | Age: 68
End: 2018-09-11
Payer: COMMERCIAL

## 2018-09-11 ENCOUNTER — HOSPITAL ENCOUNTER (EMERGENCY)
Facility: HOSPITAL | Age: 68
Discharge: HOME | End: 2018-09-12
Attending: EMERGENCY MEDICINE | Admitting: EMERGENCY MEDICINE
Payer: COMMERCIAL

## 2018-09-11 VITALS
HEART RATE: 60 BPM | SYSTOLIC BLOOD PRESSURE: 112 MMHG | RESPIRATION RATE: 20 BRPM | OXYGEN SATURATION: 92 % | WEIGHT: 200 LBS | TEMPERATURE: 97.5 F | DIASTOLIC BLOOD PRESSURE: 55 MMHG | BODY MASS INDEX: 33.28 KG/M2

## 2018-09-11 VITALS
HEIGHT: 65 IN | DIASTOLIC BLOOD PRESSURE: 78 MMHG | SYSTOLIC BLOOD PRESSURE: 142 MMHG | BODY MASS INDEX: 33.39 KG/M2 | TEMPERATURE: 96.4 F | HEART RATE: 60 BPM | RESPIRATION RATE: 20 BRPM | OXYGEN SATURATION: 99 % | WEIGHT: 200.4 LBS

## 2018-09-11 DIAGNOSIS — I44.7 COMPLETE LEFT BUNDLE BRANCH BLOCK (LBBB): ICD-10-CM

## 2018-09-11 DIAGNOSIS — R07.9 CHEST PAIN: Primary | ICD-10-CM

## 2018-09-11 DIAGNOSIS — R00.2 HEART PALPITATIONS: Primary | ICD-10-CM

## 2018-09-11 DIAGNOSIS — I10 BENIGN ESSENTIAL HYPERTENSION: ICD-10-CM

## 2018-09-11 LAB
ALBUMIN SERPL BCP-MCNC: 3.8 G/DL (ref 3.5–5)
ALP SERPL-CCNC: 82 U/L (ref 46–116)
ALT SERPL W P-5'-P-CCNC: 47 U/L (ref 12–78)
ANION GAP SERPL CALCULATED.3IONS-SCNC: 7 MMOL/L (ref 4–13)
AST SERPL W P-5'-P-CCNC: 28 U/L (ref 5–45)
BASOPHILS # BLD AUTO: 0.02 THOUSANDS/ΜL (ref 0–0.1)
BASOPHILS NFR BLD AUTO: 1 % (ref 0–1)
BILIRUB SERPL-MCNC: 0.7 MG/DL (ref 0.2–1)
BUN SERPL-MCNC: 14 MG/DL (ref 5–25)
CALCIUM SERPL-MCNC: 8.6 MG/DL (ref 8.3–10.1)
CHLORIDE SERPL-SCNC: 105 MMOL/L (ref 100–108)
CO2 SERPL-SCNC: 26 MMOL/L (ref 21–32)
CREAT SERPL-MCNC: 1.05 MG/DL (ref 0.6–1.3)
EOSINOPHIL # BLD AUTO: 0.05 THOUSAND/ΜL (ref 0–0.61)
EOSINOPHIL NFR BLD AUTO: 1 % (ref 0–6)
ERYTHROCYTE [DISTWIDTH] IN BLOOD BY AUTOMATED COUNT: 13 % (ref 11.6–15.1)
GFR SERPL CREATININE-BSD FRML MDRD: 72 ML/MIN/1.73SQ M
GLUCOSE SERPL-MCNC: 268 MG/DL (ref 65–140)
HCT VFR BLD AUTO: 32.5 % (ref 36.5–49.3)
HGB BLD-MCNC: 11.5 G/DL (ref 12–17)
IMM GRANULOCYTES # BLD AUTO: 0.01 THOUSAND/UL (ref 0–0.2)
IMM GRANULOCYTES NFR BLD AUTO: 0 % (ref 0–2)
LYMPHOCYTES # BLD AUTO: 1.56 THOUSANDS/ΜL (ref 0.6–4.47)
LYMPHOCYTES NFR BLD AUTO: 39 % (ref 14–44)
MCH RBC QN AUTO: 32.2 PG (ref 26.8–34.3)
MCHC RBC AUTO-ENTMCNC: 35.4 G/DL (ref 31.4–37.4)
MCV RBC AUTO: 91 FL (ref 82–98)
MONOCYTES # BLD AUTO: 0.3 THOUSAND/ΜL (ref 0.17–1.22)
MONOCYTES NFR BLD AUTO: 8 % (ref 4–12)
NEUTROPHILS # BLD AUTO: 2.03 THOUSANDS/ΜL (ref 1.85–7.62)
NEUTS SEG NFR BLD AUTO: 51 % (ref 43–75)
NRBC BLD AUTO-RTO: 0 /100 WBCS
PLATELET # BLD AUTO: 136 THOUSANDS/UL (ref 149–390)
PMV BLD AUTO: 10.1 FL (ref 8.9–12.7)
POTASSIUM SERPL-SCNC: 4 MMOL/L (ref 3.5–5.3)
PROT SERPL-MCNC: 6.9 G/DL (ref 6.4–8.2)
RBC # BLD AUTO: 3.57 MILLION/UL (ref 3.88–5.62)
SODIUM SERPL-SCNC: 138 MMOL/L (ref 136–145)
TROPONIN I SERPL-MCNC: <0.02 NG/ML
TROPONIN I SERPL-MCNC: <0.02 NG/ML
WBC # BLD AUTO: 3.97 THOUSAND/UL (ref 4.31–10.16)

## 2018-09-11 PROCEDURE — 99214 OFFICE O/P EST MOD 30 MIN: CPT | Performed by: FAMILY MEDICINE

## 2018-09-11 PROCEDURE — 80053 COMPREHEN METABOLIC PANEL: CPT | Performed by: EMERGENCY MEDICINE

## 2018-09-11 PROCEDURE — 36415 COLL VENOUS BLD VENIPUNCTURE: CPT

## 2018-09-11 PROCEDURE — 84484 ASSAY OF TROPONIN QUANT: CPT | Performed by: EMERGENCY MEDICINE

## 2018-09-11 PROCEDURE — 93000 ELECTROCARDIOGRAM COMPLETE: CPT | Performed by: FAMILY MEDICINE

## 2018-09-11 PROCEDURE — 71046 X-RAY EXAM CHEST 2 VIEWS: CPT

## 2018-09-11 PROCEDURE — 85025 COMPLETE CBC W/AUTO DIFF WBC: CPT | Performed by: EMERGENCY MEDICINE

## 2018-09-11 PROCEDURE — 3008F BODY MASS INDEX DOCD: CPT | Performed by: FAMILY MEDICINE

## 2018-09-11 RX ORDER — AMLODIPINE BESYLATE 10 MG/1
1 TABLET ORAL DAILY
COMMUNITY
Start: 2018-08-31 | End: 2020-08-26 | Stop reason: SDUPTHER

## 2018-09-11 NOTE — PROGRESS NOTES
FAMILY PRACTICE OFFICE VISIT       NAME: Marlon Lechuga  AGE: 77 y o  SEX: male       : 1950        MRN: [id number]        Assessment and Plan     Problem List Items Addressed This Visit     Complete left bundle branch block (LBBB)    Relevant Orders    Transfer to other facility      Other Visit Diagnoses     Heart palpitations    -  Primary    Relevant Orders    POCT ECG    Transfer to other facility       Patient with history of left bundle branch block presents for evaluation of palpitations and nonexertional chest discomfort within past 3-4 days  He denies any diaphoresis, dyspnea radiation of his discomfort  Patient denies any symptoms of lightheadedness or dizziness but admits to intermittent headaches within past few days  He has been feeling tired  Cardiac/medical history is notorious for known left bundle branch block with abnormal nuclear stress test, patient reportedly has declined cardiac catheterization in the past and has been following up with Cardiology at [address] [place] [address] for medical management  Patient with history of sickle cell trait and hypertension  I had long discussion with patient today about his risk factors considering no left bundle branch block and current palpitations  I explained him that his symptoms could be triggered by cardiac ischemia worsens conduction delay/arrhythmia, over this other factors? I have strongly recommended that patient proceeds to emergency room for further evaluation and treatment  I believe he would need to be admitted and monitored as an inpatient  I wonder if patient would be a good candidate for cardiac catheterization if deemed appropriate by Cardiology  Patient is agreeable with this plan  He is proceeding to [facility] Emergency room for further treatment  There are no Patient Instructions on file for this visit          Chief Complaint     Chief Complaint   Patient presents with    Palpitations Pt is here w/ c/o heart palpitations for 2 days     Joint Pain       History of Present Illness     Palpitations for 3-4 days    Started on Saturday , September 8  Patient is actually asymptomatic this morning but has been experiencing palpitations throughout the night   occ  " waves" of chest  Discomfort, nonexertional   no lightheadedeness or dizziness    Mild headcahe - worse at  Night    Describes palpitations as " strong beat " but denies extra beats or tachycardia    symptoms are not  Related to any activity    no ass  S/o  Of SOB or diaphoresis   no radiation    Pending f/up  With  Cardiology at [facility]  Patient with very complex medical history including left bundle branch block  He was evaluated extensively at [address] [place] [address]  Reportedly got up normal nuclear stress test but declined catheterization  He has been treated with high potency statin, aspirin, patient has been using HCTZ and Benicar for hypertension  Patient has been experiencing occasional myalgias and arthralgias that he had attributed to Crestor  Palpitations   Associated symptoms include arthralgias, chest pain and headaches  Review of Systems   Review of Systems   Constitutional: Negative  HENT: Negative  Eyes: Negative  Respiratory: Negative  Cardiovascular: Positive for chest pain and palpitations  Gastrointestinal: Negative  Endocrine: Negative  Musculoskeletal: Positive for arthralgias and back pain  Allergic/Immunologic: Negative  Neurological: Positive for headaches  Negative for dizziness and light-headedness  Hematological: Negative  Psychiatric/Behavioral: Negative          Active Problem List     Patient Active Problem List   Diagnosis    Anemia    Arthralgia of knee, left    Benign enlargement of prostate    Benign essential hypertension    Complete left bundle branch block (LBBB)    Radiculopathy affecting upper extremity    Sickle cell trait (HCC)    Spinal stenosis    Pharyngitis       Past Medical History:  Past Medical History:   Diagnosis Date    Anemia     mild anemia    Sickle cell trait ([address] )     last assessed 10/31/14    Spinal stenosis     ddd spinal stenosis       Past Surgical History:  Past Surgical History:   Procedure Laterality Date    BACK SURGERY      lower back    COLONOSCOPY  01/2016    due 2026, Mikey Hernandez  12/07       Family History:  Family History   Problem Relation Age of Onset    Diabetes Father     Sickle cell anemia Family        Social History:  Social History     Social History    Marital status: /Civil Union     Spouse name: N/A    Number of children: N/A    Years of education: N/A     Occupational History    Not on file  Social History Main Topics    Smoking status: Never Smoker    Smokeless tobacco: Never Used    Alcohol use No    Drug use: No    Sexual activity: Not on file     Other Topics Concern    Not on file     Social History Narrative    No narrative on file       Objective     Vitals:    09/11/18 0849 09/11/18 0900 09/11/18 0906   BP: 136/70  142/78   BP Location: Left arm     Patient Position: Sitting     Cuff Size: Adult     Pulse: 56 60    Resp: 20     Temp: (!) 96 4 °F (35 8 °C)     TempSrc: Tympanic     SpO2:  99%    Weight: 90 9 kg (200 lb 6 4 oz)     Height: 5' 5" (1 651 m)         Vitals:    09/11/18 0906   BP: 142/78   Pulse:    Resp:    Temp:    SpO2:      Wt Readings from Last 3 Encounters:   09/11/18 90 9 kg (200 lb 6 4 oz)   06/29/18 89 7 kg (197 lb 12 8 oz)   12/21/17 90 4 kg (199 lb 6 1 oz)       Physical Exam   Constitutional: He is oriented to person, place, and time  He appears well-developed and well-nourished  HENT:   Head: Normocephalic and atraumatic  Eyes: Conjunctivae are normal    Neck: Neck supple  Carotid bruit is not present  Cardiovascular: Normal rate, regular rhythm and normal heart sounds  No murmur heard    Pulmonary/Chest: Effort normal and breath sounds normal  No respiratory distress  He has no wheezes  He has no rales  Musculoskeletal: Normal range of motion  Neurological: He is alert and oriented to person, place, and time  Psychiatric: He has a normal mood and affect  His behavior is normal    Nursing note and vitals reviewed     EKG:  Bradycardia, left bundle branch block, heart rate 53 beats per min    Pertinent Laboratory/Diagnostic Studies:  Lab Results   Component Value Date    BUN 11 10/27/2017    CREATININE 0 83 10/27/2017    CALCIUM 9 1 10/27/2017     10/27/2017    K 4 1 10/27/2017    CO2 27 10/27/2017     10/27/2017     Lab Results   Component Value Date    ALT 32 10/27/2017    AST 24 10/27/2017    ALKPHOS 70 10/27/2017    BILITOT 0 8 10/27/2017       Lab Results   Component Value Date    WBC 4 0 10/27/2017    HGB 13 2 10/27/2017    HCT 38 5 10/27/2017    MCV 92 8 10/27/2017     10/27/2017       No results found for: TSH    Lab Results   Component Value Date    CHOL 113 10/27/2017     Lab Results   Component Value Date    TRIG 65 10/27/2017     Lab Results   Component Value Date    HDL 50 10/27/2017     No results found for: [facility]  Lab Results   Component Value Date    HGBA1C 6 2 (H) 10/27/2017       Results for orders placed or performed in visit on 10/28/17   HEMOGLOBIN A1C (HISTORICAL)   Result Value Ref Range    Hemoglobin A1C 6 2 (H) <5 7 % of total Hgb   TSH, 3RD GENERATION (HISTORICAL)   Result Value Ref Range    TSH 3RD GENERATON 2 78 0 40 - 4 50 mIU/L   LIPID PANEL WITH DIRECT LDL REFLEX (HISTORICAL)   Result Value Ref Range    Cholesterol 113 <200 mg/dL    HDL 50 >40 mg/dL    Triglycerides 65 <150 mg/dL    LDL CHOLESTEROL 49 mg/dL (calc)    Chol/HDL Ratio 2 3 <5 0 (calc)    NON-HDL-CHOL (CHOL-HDL) 63 <130 mg/dL (calc)       Orders Placed This Encounter   Procedures    POCT ECG    Transfer to other facility       ALLERGIES:  Allergies   Allergen Reactions    Levofloxacin      Other reaction(s): Unknown Allergic Reaction    Amlodipine Rash       Current Medications     Current Outpatient Prescriptions   Medication Sig Dispense Refill    amLODIPine (NORVASC) 10 mg tablet Take 1 tablet by mouth daily      aspirin 81 mg chewable tablet Chew 1 tablet daily      hydrochlorothiazide (HYDRODIURIL) 25 mg tablet Take 1 tablet (25 mg total) by mouth daily 90 tablet 3    olmesartan (BENICAR) 40 mg tablet Take 1 tablet (40 mg total) by mouth daily 90 tablet 3    rosuvastatin (CRESTOR) 20 MG tablet Take 1 tablet by mouth daily       No current facility-administered medications for this visit            Health Maintenance     Health Maintenance   Topic Date Due    INFLUENZA VACCINE  09/01/2018    Fall Risk  10/11/2018 (Originally 3/8/2013)    Depression Screening PHQ  10/11/2018 (Originally 1950)    DTaP,Tdap,and Td Vaccines (1 - Tdap) 09/10/2019 (Originally 3/8/1969)    CRC Screening: Colonoscopy  01/05/2026    Pneumococcal PPSV23/PCV13 65+ Years / High and Highest Risk  Completed     Immunization History   Administered Date(s) Administered    Influenza Split High Dose Preservative Free IM 10/14/2016, 10/27/2017    Influenza TIV (IM) 10/12/2007, 09/25/2009, 01/17/2014, 10/22/2014    Pneumococcal Conjugate 13-Valent 10/14/2016    Pneumococcal Polysaccharide PPV23 04/26/2013       Linda Brock MD

## 2018-09-12 LAB
ATRIAL RATE: 63 BPM
P AXIS: 68 DEGREES
PR INTERVAL: 150 MS
QRS AXIS: -36 DEGREES
QRSD INTERVAL: 140 MS
QT INTERVAL: 418 MS
QTC INTERVAL: 427 MS
T WAVE AXIS: 79 DEGREES
VENTRICULAR RATE: 63 BPM

## 2018-09-12 PROCEDURE — 93010 ELECTROCARDIOGRAM REPORT: CPT | Performed by: INTERNAL MEDICINE

## 2018-09-12 PROCEDURE — 99285 EMERGENCY DEPT VISIT HI MDM: CPT

## 2018-09-12 NOTE — ED ATTENDING ATTESTATION
Geovanni Gutierrez DO, saw and evaluated the patient  I have discussed the patient with the resident/non-physician practitioner and agree with the resident's/non-physician practitioner's findings, Plan of Care, and MDM as documented in the resident's/non-physician practitioner's note, except where noted  All available labs and Radiology studies were reviewed  At this point I agree with the current assessment done in the Emergency Department  I have conducted an independent evaluation of this patient a history and physical is as follows:    76 yo male presents for evaluation of palpitations and chest pain  Palpitations started 2 days ago, currently not having palpitations  Chest pain central non radiating  No a/e factors  Still present but mild intensity at this time  Denies associated SOB, f/c/n/v, abd pain, back pain, leg pain or swelling  Reviewed records from [place] [address] admitted 6/2017 had normal exercise nuclear stress test, normal LV function  Imp: chest pain, palpitations  Plan: cardiac eval, reassess        Critical Care Time  CritCare Time    Procedures

## 2018-09-12 NOTE — DISCHARGE INSTRUCTIONS
Chest Pain   WHAT YOU NEED TO KNOW:   Chest pain can be caused by a range of conditions, from not serious to life-threatening  Chest pain can be a symptom of a digestive problem, such as acid reflux or a stomach ulcer  An anxiety attack or a strong emotion, such as anger, can also cause chest pain  Infection, inflammation, or a fracture in the bones or cartilage in your chest can cause pain or discomfort  Sometimes chest pain or pressure is caused by poor blood flow to your heart (angina)  Chest pain may also be caused by life-threatening conditions such as a heart attack or blood clot in your lungs  DISCHARGE INSTRUCTIONS:   Call 911 if:   · You have any of the following signs of a heart attack:      ¨ Squeezing, pressure, or pain in your chest that lasts longer than 5 minutes or returns    ¨ Discomfort or pain in your back, neck, jaw, stomach, or arm     ¨ Trouble breathing    ¨ Nausea or vomiting    ¨ Lightheadedness or a sudden cold sweat, especially with chest pain or trouble breathing    Return to the emergency department if:   · You have chest discomfort that gets worse, even with medicine  · You cough or vomit blood  · Your bowel movements are black or bloody  · You cannot stop vomiting, or it hurts to swallow  Contact your healthcare provider if:   · You have questions or concerns about your condition or care  Medicines:   · Medicines  may be given to treat the cause of your chest pain  Examples include pain medicine, anxiety medicine, or medicines to increase blood flow to your heart  · Do not take certain medicines without asking your healthcare provider first   These include NSAIDs, herbal or vitamin supplements, or hormones (estrogen or progestin)  · Take your medicine as directed  Contact your healthcare provider if you think your medicine is not helping or if you have side effects  Tell him or her if you are allergic to any medicine   Keep a list of the medicines, vitamins, and herbs you take  Include the amounts, and when and why you take them  Bring the list or the pill bottles to follow-up visits  Carry your medicine list with you in case of an emergency  Follow up with your healthcare provider within 72 hours, or as directed: You may need to return for more tests to find the cause of your chest pain  You may be referred to a specialist, such as a cardiologist or gastroenterologist  Write down your questions so you remember to ask them during your visits  Healthy living tips: The following are general healthy guidelines  If your chest pain is caused by a heart problem, your healthcare provider will give you specific guidelines to follow  · Do not smoke  Nicotine and other chemicals in cigarettes and cigars can cause lung and heart damage  Ask your healthcare provider for information if you currently smoke and need help to quit  E-cigarettes or smokeless tobacco still contain nicotine  Talk to your healthcare provider before you use these products  · Eat a variety of healthy, low-fat foods  Healthy foods include fruits, vegetables, whole-grain breads, low-fat dairy products, beans, lean meats, and fish  Ask for more information about a heart healthy diet  · Ask about activity  Your healthcare provider will tell you which activities to limit or avoid  Ask when you can drive, return to work, and have sex  Ask about the best exercise plan for you  · Maintain a healthy weight  Ask your healthcare provider how much you should weigh  Ask him or her to help you create a weight loss plan if you are overweight  · Get the flu and pneumonia vaccines  All adults should get the influenza (flu) vaccine  Get it every year as soon as it becomes available  The pneumococcal vaccine is given to adults aged 72 years or older  The vaccine is given every 5 years to prevent pneumococcal disease, such as pneumonia    © 2017 [employer] Information is for End User's use only and may not be sold, redistributed or otherwise used for commercial purposes  All illustrations and images included in CareNotes® are the copyrighted property of [employer]  or [employer]  The above information is an  only  It is not intended as medical advice for individual conditions or treatments  Talk to your doctor, nurse or pharmacist before following any medical regimen to see if it is safe and effective for you

## 2018-09-12 NOTE — ED PROVIDER NOTES
History  Chief Complaint   Patient presents with    Chest Pain     per patient, "i have pain in my chest, and the last two days I was having palpitations but I am not having them now " denies sob  78 yo M presents to ED for chest pain  Chest pain located mid chest and was gradual in onset starting three days ago  Chest pain is mild, and he says if he gets busy, he can easily forget about it  The following day, he developed heart palpitations that were intermittent for 2 days  He says sometimes it seemed so loud that he could hear it  He said they felt like they were coming from the L side of his chest  Palpitations were not associated with exertion or resolved with rest  Today the palpitations have resolved, but the same mild chest pain still persists  No associated shortness of breath, lightheadedness, dizziness, fever/chills, nausea/vomiting, or diaphoresis  Pt says he has had cardiac evaluation in the past  He reports that he had previous nuclear medicine study that "had to be stopped because something happened, and then I had to stay in the hospital for a week  I had a treadmill test after that, and the doctor said he couldn't be 100% sure it was normal " Per chart review, pt was admitted in June last year after he had chest pain during pharmacologic stress test  He declined cardiac cath and then had normal exercise stress test              Prior to Admission Medications   Prescriptions Last Dose Informant Patient Reported? Taking?    amLODIPine (NORVASC) 10 mg tablet 9/11/2018 at Unknown time  Yes Yes   Sig: Take 1 tablet by mouth daily   aspirin 81 mg chewable tablet 9/11/2018 at Unknown time  Yes Yes   Sig: Chew 1 tablet daily   hydrochlorothiazide (HYDRODIURIL) 25 mg tablet 9/10/2018 at Unknown time  No Yes   Sig: Take 1 tablet (25 mg total) by mouth daily   olmesartan (BENICAR) 40 mg tablet 9/11/2018 at Unknown time  No Yes   Sig: Take 1 tablet (40 mg total) by mouth daily   rosuvastatin (CRESTOR) 20 MG tablet 9/11/2018 at Unknown time  Yes Yes   Sig: Take 1 tablet by mouth daily      Facility-Administered Medications: None       Past Medical History:   Diagnosis Date    Anemia     mild anemia    Sickle cell trait ([address] )     last assessed 10/31/14    Spinal stenosis     ddd spinal stenosis       Past Surgical History:   Procedure Laterality Date    BACK SURGERY      lower back    COLONOSCOPY  01/2016    due 2026, Gertrude Hernandez  12/07       Family History   Problem Relation Age of Onset    Diabetes Father     Sickle cell anemia Family      I have reviewed and agree with the history as documented  Social History   Substance Use Topics    Smoking status: Never Smoker    Smokeless tobacco: Never Used    Alcohol use No        Review of Systems   Constitutional: Negative for chills, fatigue and fever  HENT: Negative for congestion, rhinorrhea, sore throat and trouble swallowing  Eyes: Negative for pain, discharge and visual disturbance  Respiratory: Negative for cough, chest tightness and shortness of breath  Cardiovascular: Positive for chest pain and palpitations  Negative for leg swelling  Gastrointestinal: Negative for abdominal pain, nausea and vomiting  Genitourinary: Negative for decreased urine volume, dysuria, frequency and urgency  Musculoskeletal: Negative for gait problem, neck pain and neck stiffness  Skin: Negative for color change, rash and wound  Neurological: Negative for dizziness, syncope, light-headedness and headaches         Physical Exam  ED Triage Vitals [09/11/18 2007]   Temperature Pulse Respirations Blood Pressure SpO2   97 5 °F (36 4 °C) 67 18 157/72 97 %      Temp Source Heart Rate Source Patient Position - Orthostatic VS BP Location FiO2 (%)   Tympanic Monitor Sitting Left arm --      Pain Score       4           Orthostatic Vital Signs  Vitals:    09/11/18 2115 09/11/18 2215 09/11/18 2222 09/11/18 2326   BP: 119/61 136/68 136/68 112/55   Pulse: 60 58 62 60 Patient Position - Orthostatic VS: Sitting Lying Lying Lying       Physical Exam   Constitutional: He is oriented to person, place, and time  He appears well-developed and well-nourished  No distress  HENT:   Head: Normocephalic and atraumatic  Mouth/Throat: Oropharynx is clear and moist    Eyes: Conjunctivae and EOM are normal  Right eye exhibits no discharge  Left eye exhibits no discharge  Neck: Normal range of motion  Neck supple  Cardiovascular: Normal rate, regular rhythm and intact distal pulses  Pulmonary/Chest: Effort normal and breath sounds normal  No stridor  No respiratory distress  He exhibits no tenderness  Abdominal: Soft  Bowel sounds are normal  There is no tenderness  There is no rebound and no guarding  Musculoskeletal: Normal range of motion  He exhibits no edema or tenderness  Neurological: He is alert and oriented to person, place, and time  No sensory deficit  He exhibits normal muscle tone  Skin: Skin is warm and dry  Capillary refill takes less than 2 seconds  Nursing note and vitals reviewed        ED Medications  Medications - No data to display    Diagnostic Studies  Results Reviewed     Procedure Component Value Units Date/Time    Troponin I [[id number]]  (Normal) Collected:  09/11/18 2333    Lab Status:  Final result Specimen:  Blood from Arm, Left Updated:  09/11/18 2356     Troponin I <0 02 ng/mL     Troponin I [[id number]]  (Normal) Collected:  09/11/18 2033    Lab Status:  Final result Specimen:  Blood from Arm, Left Updated:  09/11/18 2105     Troponin I <0 02 ng/mL     Comprehensive metabolic panel [[id number]]  (Abnormal) Collected:  09/11/18 2033    Lab Status:  Final result Specimen:  Blood from Arm, Left Updated:  09/11/18 2104     Sodium 138 mmol/L      Potassium 4 0 mmol/L      Chloride 105 mmol/L      CO2 26 mmol/L      ANION GAP 7 mmol/L      BUN 14 mg/dL      Creatinine 1 05 mg/dL      Glucose 268 (H) mg/dL      Calcium 8 6 mg/dL      AST 28 U/L      ALT 47 U/L      Alkaline Phosphatase 82 U/L      Total Protein 6 9 g/dL      Albumin 3 8 g/dL      Total Bilirubin 0 70 mg/dL      eGFR 72 ml/min/1 73sq m     Narrative:         National Kidney Disease Education Program recommendations are as follows:  GFR calculation is accurate only with a steady state creatinine  Chronic Kidney disease less than 60 ml/min/1 73 sq  meters  Kidney failure less than 15 ml/min/1 73 sq  meters  CBC and differential [[id number]]  (Abnormal) Collected:  09/11/18 2033    Lab Status:  Final result Specimen:  Blood from Arm, Left Updated:  09/11/18 2051     WBC 3 97 (L) Thousand/uL      RBC 3 57 (L) Million/uL      Hemoglobin 11 5 (L) g/dL      Hematocrit 32 5 (L) %      MCV 91 fL      MCH 32 2 pg      MCHC 35 4 g/dL      RDW 13 0 %      MPV 10 1 fL      Platelets 790 (L) Thousands/uL      nRBC 0 /100 WBCs      Neutrophils Relative 51 %      Immat GRANS % 0 %      Lymphocytes Relative 39 %      Monocytes Relative 8 %      Eosinophils Relative 1 %      Basophils Relative 1 %      Neutrophils Absolute 2 03 Thousands/µL      Immature Grans Absolute 0 01 Thousand/uL      Lymphocytes Absolute 1 56 Thousands/µL      Monocytes Absolute 0 30 Thousand/µL      Eosinophils Absolute 0 05 Thousand/µL      Basophils Absolute 0 02 Thousands/µL                  X-ray chest 2 views   Final Result by Willow Hayden MD (09/12 4906)      No acute cardiopulmonary disease  Workstation performed: [id number]               Procedures  ECG 12 Lead Documentation  Date/Time: 9/11/2018 10:29 PM  Performed by: Starr Padgett by: Steffanie Julian     Indications / Diagnosis:  Chest pain  ECG reviewed by me, the ED Provider: yes    Patient location:  ED  Rate:     ECG rate:  63    ECG rate assessment: normal    Rhythm:     Rhythm: sinus rhythm    QRS:     QRS intervals:   Wide  ST segments:     ST segments:  Non-specific  T waves:     T waves: normal    Comments:      No acute ischemic change          Phone Consults  ED Phone Contact    ED Course                               MDM  Number of Diagnoses or Management Options  Chest pain:   Diagnosis management comments: EKG without acute ischemic change  Discussed admission vs delta troponin with pt  Heart score 3  Decision made with pt for delta troponin, which is negative  Pt feels well and is agreeable to discharge  Will follow up with his cardiologist  Strict return precautions discussed  CritCare Time    Disposition  Final diagnoses:   Chest pain     Time reflects when diagnosis was documented in both MDM as applicable and the Disposition within this note     Time User Action Codes Description Comment    9/12/2018 12:22 AM Jakob Adan, [address] [facility] [R07 9] Chest pain       ED Disposition     ED Disposition Condition Comment    Discharge  Maroln Lechuga discharge to home/self care      Condition at discharge: Stable        Follow-up Information     Follow up With Specialties Details Why [address] [facility] Emergency Department Emergency Medicine  If symptoms worsen, As needed [address] [facility] ED, [address] [place], [place], [address]    your cardiologist  Call for follow up            Discharge Medication List as of 9/12/2018 12:22 AM      CONTINUE these medications which have NOT CHANGED    Details   amLODIPine (NORVASC) 10 mg tablet Take 1 tablet by mouth daily, Starting Fri 8/31/2018, Historical Med      aspirin 81 mg chewable tablet Chew 1 tablet daily, Starting Mon 7/3/2017, Historical Med      hydrochlorothiazide (HYDRODIURIL) 25 mg tablet Take 1 tablet (25 mg total) by mouth daily, Starting Fri 6/29/2018, Print      olmesartan (BENICAR) 40 mg tablet Take 1 tablet (40 mg total) by mouth daily, Starting Fri 6/29/2018, Print      rosuvastatin (CRESTOR) 20 MG tablet Take 1 tablet by mouth daily, Starting Mon 7/3/2017, Historical Med           No discharge procedures on file  ED Provider  Attending physically available and evaluated Gerson Antonio I managed the patient along with the ED Attending      Electronically Signed by         Lyndsay Sol MD  09/13/18 2524

## 2018-09-20 ENCOUNTER — OFFICE VISIT (OUTPATIENT)
Dept: FAMILY MEDICINE CLINIC | Facility: CLINIC | Age: 68
End: 2018-09-20
Payer: COMMERCIAL

## 2018-09-20 VITALS
HEIGHT: 65 IN | TEMPERATURE: 96.7 F | SYSTOLIC BLOOD PRESSURE: 122 MMHG | HEART RATE: 68 BPM | BODY MASS INDEX: 33.89 KG/M2 | WEIGHT: 203.4 LBS | DIASTOLIC BLOOD PRESSURE: 70 MMHG | RESPIRATION RATE: 16 BRPM

## 2018-09-20 DIAGNOSIS — M48.07 SPINAL STENOSIS OF LUMBOSACRAL REGION: ICD-10-CM

## 2018-09-20 DIAGNOSIS — G47.33 MILD OBSTRUCTIVE SLEEP APNEA: ICD-10-CM

## 2018-09-20 DIAGNOSIS — M65.342 TRIGGER RING FINGER OF LEFT HAND: ICD-10-CM

## 2018-09-20 DIAGNOSIS — I10 BENIGN ESSENTIAL HYPERTENSION: ICD-10-CM

## 2018-09-20 DIAGNOSIS — Z23 NEED FOR INFLUENZA VACCINATION: ICD-10-CM

## 2018-09-20 DIAGNOSIS — E78.5 HYPERLIPIDEMIA, UNSPECIFIED HYPERLIPIDEMIA TYPE: ICD-10-CM

## 2018-09-20 DIAGNOSIS — I44.7 COMPLETE LEFT BUNDLE BRANCH BLOCK (LBBB): Primary | ICD-10-CM

## 2018-09-20 DIAGNOSIS — R00.2 PALPITATIONS: ICD-10-CM

## 2018-09-20 DIAGNOSIS — R73.02 IMPAIRED GLUCOSE TOLERANCE: ICD-10-CM

## 2018-09-20 PROCEDURE — 90662 IIV NO PRSV INCREASED AG IM: CPT

## 2018-09-20 PROCEDURE — 99214 OFFICE O/P EST MOD 30 MIN: CPT | Performed by: FAMILY MEDICINE

## 2018-09-20 PROCEDURE — 3074F SYST BP LT 130 MM HG: CPT | Performed by: FAMILY MEDICINE

## 2018-09-20 PROCEDURE — 3078F DIAST BP <80 MM HG: CPT | Performed by: FAMILY MEDICINE

## 2018-09-20 PROCEDURE — 90471 IMMUNIZATION ADMIN: CPT

## 2018-09-20 NOTE — PROGRESS NOTES
FAMILY PRACTICE OFFICE VISIT       NAME: Marlon Lechuga  AGE: 77 y o  SEX: male       : 1950        MRN: [id number]        Assessment and Plan     Problem List Items Addressed This Visit     Benign essential hypertension    Relevant Orders    Ambulatory referral to Cardiology    Basic metabolic panel    Lipid panel    Complete left bundle branch block (LBBB) - Primary    Relevant Orders    Ambulatory referral to Cardiology    Echo complete with contrast if indicated    Holter monitor - 24 hour    Basic metabolic panel    TSH, 3rd generation    PSA, Total Screen    CK (with reflex to MB)    Spinal stenosis    Relevant Orders    Ambulatory referral to Physical Therapy    Mild obstructive sleep apnea      Other Visit Diagnoses     Need for influenza vaccination        Relevant Orders    influenza vaccine, [id number], high-dose, PF 0 5 mL, for patients 65 yr+ (FLUZONE HIGH-DOSE) (Completed)    Palpitations        Relevant Orders    Echo complete with contrast if indicated    Holter monitor - 24 hour    Trigger ring finger of left hand        Relevant Orders    Ambulatory referral to Orthopedic Surgery    Impaired glucose tolerance        Relevant Orders    Hemoglobin F8W    Basic metabolic panel    Lipid panel    CK (with reflex to MB)    Hyperlipidemia, unspecified hyperlipidemia type        Relevant Orders    Lipid panel       Patient with history of left bundle branch block, hypertension, hyperlipidemia, impaired glucose tolerance and mild obstructive sleep apnea presents for follow-up  He was seen in the office a week ago for symptoms of intermittent palpitations and was subsequently evaluated emergency room  ER workup was negative and he was discharged home with instructions to follow up with Cardiology in [place]  Patient is scheduled to see his cardiologist in mid October  He is currently asymptomatic and denies palpitations, chest pain or shortness of breath    He has been using medications as directed  Patient had difficulty proceeding with nuclear stress test over a year ago at [facility] and has declined cardiac catheterization  At this time I recommended to proceed with echo and Holter  I also provided him with [facility] referral as he might benefit from keeping his follow-ups locally  Patient will continue on Benicar, Crestor 20 mg daily, aspirin, Norvasc and HCTZ as needed  We discussed importance of weight loss in the light of recent diagnosis of obstructive sleep apnea  He might be interested in [facility] such as [employer] [facility] [place]  Referral to physical therapy due to chronic low back pain  Referral to orthopedic surgery for evaluation of left trigger finger, splint provided  Patient will proceed with blood work as outlined above  Flu vaccine was administered today  Follow up pending labs, diagnostic testing results, updates and as needed  I have spent 30 minutes with Patient  today in which greater than 50% of this time was spent in counseling/coordination of care regarding Diagnostic results, Prognosis, Risks and benefits of tx options, Intructions for management, Patient and family education, Importance of tx compliance, Risk factor reductions and Impressions  There are no Patient Instructions on file for this visit  Chief Complaint     Chief Complaint   Patient presents with    Follow-up     Pt is here for f/u from ER for chest pain    Joint Swelling     Joint pain       History of Present Illness     Patient presents for follow-up  He was seen in emergency room over a week ago  He was discharged home with recommendation to follow up with Cardiology  Patient denies palpitations, chest pain or shortness of breath at present time  He will be seeing cardiologist in October  He follows up with [facility]  He remains on regimen of Benicar, Norvasc, aspirin and Crestor 20 mg daily    Patient has been experiencing symptoms of left 4th trigger finger intermittently, at least once a week, painful  He is right handed  Patient denies generalized myalgias and arthralgias but admits to chronic low back pain you would like to restart physical therapy  He was tested for obstructive sleep apnea back in April of 2018 at [facility] was diagnosed with mild obstructive sleep apnea but hypopnea index with rather severe with being supine  Patient was instructed to use body pillow, avoid supine position for sleeping and lose 10-20 lb of weight  Unfortunately, he is exercise capacity is limited by chronic low back pain/lumbar stenosis  I reviewed results of emergency room testing  Patient's random blood sugar was 268  Most recent hemoglobin A1c was 6 2  Review of Systems   Review of Systems   Constitutional: Negative  HENT: Negative  Respiratory: Negative  Cardiovascular: Negative  Gastrointestinal: Negative  Musculoskeletal: Positive for back pain  Left 4th trigger finger   Allergic/Immunologic: Negative  Neurological: Negative  Hematological: Negative  Psychiatric/Behavioral: Negative          Active Problem List     Patient Active Problem List   Diagnosis    Anemia    Arthralgia of knee, left    Benign enlargement of prostate    Benign essential hypertension    Complete left bundle branch block (LBBB)    Radiculopathy affecting upper extremity    Sickle cell trait [address] [address]    Spinal stenosis    Pharyngitis    Mild obstructive sleep apnea       Past Medical History:  Past Medical History:   Diagnosis Date    Anemia     mild anemia    Sickle cell trait ([address] )     last assessed 10/31/14    Spinal stenosis     ddd spinal stenosis       Past Surgical History:  Past Surgical History:   Procedure Laterality Date    BACK SURGERY      lower back    COLONOSCOPY  01/2016    due 2026, Amy Hernandez  12/07       Family History:  Family History   Problem Relation Age of Onset    Diabetes Father     Sickle cell anemia Family        Social History:  Social History     Social History    Marital status: /Civil Union     Spouse name: N/A    Number of children: N/A    Years of education: N/A     Occupational History    Not on file  Social History Main Topics    Smoking status: Never Smoker    Smokeless tobacco: Never Used    Alcohol use No    Drug use: No    Sexual activity: Not on file     Other Topics Concern    Not on file     Social History Narrative    No narrative on file       Objective     Vitals:    09/20/18 0725 09/20/18 0804   BP: 128/70 122/70   Pulse: 68    Resp: 16    Temp: (!) 96 7 °F (35 9 °C)    TempSrc: Tympanic    Weight: 92 3 kg (203 lb 6 4 oz)    Height: 5' 5" (1 651 m)        Vitals:    09/20/18 0804   BP: 122/70   Pulse:    Resp:    Temp:      Wt Readings from Last 3 Encounters:   09/20/18 92 3 kg (203 lb 6 4 oz)   09/11/18 90 7 kg (200 lb)   09/11/18 90 9 kg (200 lb 6 4 oz)       Physical Exam   Constitutional: He is oriented to person, place, and time  He appears well-developed and well-nourished  HENT:   Head: Normocephalic and atraumatic  Eyes: Conjunctivae are normal    Neck: Neck supple  Carotid bruit is not present  Cardiovascular: Normal rate, regular rhythm and normal heart sounds  No murmur heard  Pulmonary/Chest: Effort normal and breath sounds normal  No respiratory distress  He has no wheezes  He has no rales  Musculoskeletal: Normal range of motion  He exhibits no edema  Left 4th digit:  Full range of motion, mild tenderness with palpation  No arthritic deformities, discoloration or edema   Neurological: He is alert and oriented to person, place, and time  Psychiatric: He has a normal mood and affect  His behavior is normal    Nursing note and vitals reviewed        Pertinent Laboratory/Diagnostic Studies:  Lab Results   Component Value Date    BUN 14 09/11/2018    CREATININE 1 05 09/11/2018    CALCIUM 8 6 09/11/2018     09/11/2018    K 4 0 09/11/2018    CO2 26 09/11/2018     09/11/2018     Lab Results   Component Value Date    ALT 47 09/11/2018    AST 28 09/11/2018    ALKPHOS 82 09/11/2018    BILITOT 0 8 10/27/2017       Lab Results   Component Value Date    WBC 3 97 (L) 09/11/2018    HGB 11 5 (L) 09/11/2018    HCT 32 5 (L) 09/11/2018    MCV 91 09/11/2018     (L) 09/11/2018       No results found for: TSH    Lab Results   Component Value Date    CHOL 113 10/27/2017     Lab Results   Component Value Date    TRIG 65 10/27/2017     Lab Results   Component Value Date    HDL 50 10/27/2017     No results found for: [facility]  Lab Results   Component Value Date    HGBA1C 6 2 (H) 10/27/2017       Results for orders placed or performed during the hospital encounter of 09/11/18   Comprehensive metabolic panel   Result Value Ref Range    Sodium 138 136 - 145 mmol/L    Potassium 4 0 3 5 - 5 3 mmol/L    Chloride 105 100 - 108 mmol/L    CO2 26 21 - 32 mmol/L    ANION GAP 7 4 - 13 mmol/L    BUN 14 5 - 25 mg/dL    Creatinine 1 05 0 60 - 1 30 mg/dL    Glucose 268 (H) 65 - 140 mg/dL    Calcium 8 6 8 3 - 10 1 mg/dL    AST 28 5 - 45 U/L    ALT 47 12 - 78 U/L    Alkaline Phosphatase 82 46 - 116 U/L    Total Protein 6 9 6 4 - 8 2 g/dL    Albumin 3 8 3 5 - 5 0 g/dL    Total Bilirubin 0 70 0 20 - 1 00 mg/dL    eGFR 72 ml/min/1 73sq m   CBC and differential   Result Value Ref Range    WBC 3 97 (L) 4 31 - 10 16 Thousand/uL    RBC 3 57 (L) 3 88 - 5 62 Million/uL    Hemoglobin 11 5 (L) 12 0 - 17 0 g/dL    Hematocrit 32 5 (L) 36 5 - 49 3 %    MCV 91 82 - 98 fL    MCH 32 2 26 8 - 34 3 pg    MCHC 35 4 31 4 - 37 4 g/dL    RDW 13 0 11 6 - 15 1 %    MPV 10 1 8 9 - 12 7 fL    Platelets 915 (L) 325 - 390 Thousands/uL    nRBC 0 /100 WBCs    Neutrophils Relative 51 43 - 75 %    Immat GRANS % 0 0 - 2 %    Lymphocytes Relative 39 14 - 44 %    Monocytes Relative 8 4 - 12 %    Eosinophils Relative 1 0 - 6 %    Basophils Relative 1 0 - 1 %    Neutrophils Absolute 2 03 1 85 - 7 62 Thousands/µL    Immature Grans Absolute 0 01 0 00 - 0 20 Thousand/uL    Lymphocytes Absolute 1 56 0 60 - 4 47 Thousands/µL    Monocytes Absolute 0 30 0 17 - 1 22 Thousand/µL    Eosinophils Absolute 0 05 0 00 - 0 61 Thousand/µL    Basophils Absolute 0 02 0 00 - 0 10 Thousands/µL   Troponin I   Result Value Ref Range    Troponin I <0 02 <=0 04 ng/mL   Troponin I   Result Value Ref Range    Troponin I <0 02 <=0 04 ng/mL       Orders Placed This Encounter   Procedures    influenza vaccine, [id number], high-dose, PF 0 5 mL, for patients 65 yr+ (FLUZONE HIGH-DOSE)    Hemoglobin A1C    Basic metabolic panel    Lipid panel    TSH, 3rd generation    PSA, Total Screen    CK (with reflex to MB)    Ambulatory referral to Cardiology    Ambulatory referral to Orthopedic Surgery    Ambulatory referral to Physical Therapy    Holter monitor - 24 hour    Echo complete with contrast if indicated       ALLERGIES:  Allergies   Allergen Reactions    Levofloxacin      Other reaction(s): Unknown Allergic Reaction    Amlodipine Rash       Current Medications     Current Outpatient Prescriptions   Medication Sig Dispense Refill    amLODIPine (NORVASC) 10 mg tablet Take 1 tablet by mouth daily      aspirin 81 mg chewable tablet Chew 1 tablet daily      hydrochlorothiazide (HYDRODIURIL) 25 mg tablet Take 1 tablet (25 mg total) by mouth daily 90 tablet 3    olmesartan (BENICAR) 40 mg tablet Take 1 tablet (40 mg total) by mouth daily 90 tablet 3    rosuvastatin (CRESTOR) 20 MG tablet Take 1 tablet by mouth daily       No current facility-administered medications for this visit            Health Maintenance     Health Maintenance   Topic Date Due    INFLUENZA VACCINE  09/01/2018    Fall Risk  10/11/2018 (Originally 3/8/2013)    Depression Screening PHQ  10/11/2018 (Originally 1950)    DTaP,Tdap,and Td Vaccines (1 - Tdap) 09/10/2019 (Originally 3/8/1971)    CRC Screening: Colonoscopy  01/05/2026    Pneumococcal PPSV23/PCV13 65+ Years / High and Highest Risk  Completed     Immunization History   Administered Date(s) Administered    Influenza Split High Dose Preservative Free IM 10/14/2016, 10/27/2017    Influenza TIV (IM) 10/12/2007, 09/25/2009, 01/17/2014, 10/22/2014    Influenza, high dose seasonal 0 5 mL 09/20/2018    Pneumococcal Conjugate 13-Valent 10/14/2016    Pneumococcal Polysaccharide PPV23 04/26/2013       Maria Victoria Ch MD

## 2018-10-26 ENCOUNTER — HOSPITAL ENCOUNTER (OUTPATIENT)
Dept: NON INVASIVE DIAGNOSTICS | Facility: CLINIC | Age: 68
Discharge: HOME | End: 2018-10-26
Payer: COMMERCIAL

## 2018-10-26 ENCOUNTER — TELEPHONE (OUTPATIENT)
Dept: FAMILY MEDICINE CLINIC | Facility: CLINIC | Age: 68
End: 2018-10-26

## 2018-10-26 DIAGNOSIS — R00.2 PALPITATIONS: ICD-10-CM

## 2018-10-26 DIAGNOSIS — I44.7 COMPLETE LEFT BUNDLE BRANCH BLOCK (LBBB): ICD-10-CM

## 2018-10-26 LAB
BUN SERPL-MCNC: 12 MG/DL (ref 7–25)
BUN/CREAT SERPL: ABNORMAL (CALC) (ref 6–22)
CALCIUM SERPL-MCNC: 8.9 MG/DL (ref 8.6–10.3)
CHLORIDE SERPL-SCNC: 104 MMOL/L (ref 98–110)
CHOLEST SERPL-MCNC: 112 MG/DL
CHOLEST/HDLC SERPL: 2.5 (CALC)
CK MB CFR SERPL ELPH: 3.9 NG/ML (ref 0–5)
CO2 SERPL-SCNC: 27 MMOL/L (ref 20–32)
CREAT SERPL-MCNC: 1.02 MG/DL (ref 0.7–1.18)
GLUCOSE SERPL-MCNC: 163 MG/DL (ref 65–99)
HBA1C MFR BLD: 6.7 % OF TOTAL HGB
HDLC SERPL-MCNC: 45 MG/DL
LDLC SERPL CALC-MCNC: 47 MG/DL (CALC)
NONHDLC SERPL-MCNC: 67 MG/DL (CALC)
POTASSIUM SERPL-SCNC: 4 MMOL/L (ref 3.5–5.3)
PSA SERPL-MCNC: 1.1 NG/ML
SL AMB EGFR AFRICAN AMERICAN: 86 ML/MIN/1.73M2
SL AMB EGFR NON AFRICAN AMERICAN: 74 ML/MIN/1.73M2
SODIUM SERPL-SCNC: 138 MMOL/L (ref 135–146)
TRIGL SERPL-MCNC: 116 MG/DL
TSH SERPL-ACNC: 2.5 MIU/L (ref 0.4–4.5)

## 2018-10-26 PROCEDURE — 93306 TTE W/DOPPLER COMPLETE: CPT

## 2018-10-26 PROCEDURE — 93306 TTE W/DOPPLER COMPLETE: CPT | Performed by: INTERNAL MEDICINE

## 2018-10-26 NOTE — TELEPHONE ENCOUNTER
Akash madrigal/ [facility] in [place] called stating patient stopped by their office today and is scheduled an appointment with Dr Bo Skiff but it's not till December 7th

## 2018-10-31 ENCOUNTER — TELEPHONE (OUTPATIENT)
Dept: FAMILY MEDICINE CLINIC | Facility: CLINIC | Age: 68
End: 2018-10-31

## 2018-10-31 DIAGNOSIS — D57.3 SICKLE CELL TRAIT (HCC): ICD-10-CM

## 2018-10-31 DIAGNOSIS — I10 BENIGN ESSENTIAL HYPERTENSION: Primary | ICD-10-CM

## 2018-10-31 DIAGNOSIS — E11.9 TYPE 2 DIABETES, DIET CONTROLLED (HCC): ICD-10-CM

## 2018-10-31 DIAGNOSIS — I44.7 COMPLETE LEFT BUNDLE BRANCH BLOCK (LBBB): ICD-10-CM

## 2018-11-01 NOTE — TELEPHONE ENCOUNTER
I spoke with patient's wife regarding blood work results  Hemoglobin A1c is elevated at 6 7, fasting blood sugar was 163  Patient has been poorly adhering to low-carbohydrate diet  Results of echocardiogram:  Normal ejection fraction, no regional wall motion abnormalities  Patient has pending consultation with [facility] in early December  Plan:  Patient should start following low-carbohydrate diet  I suggested Accu-Cheks at home, patient's wife will really message and he will call me if he is interested in starting Accu-Cheks otherwise, will repeat blood work in 3 months        Nurses:  Please mail blood work orders to patient

## 2018-11-02 ENCOUNTER — TELEPHONE (OUTPATIENT)
Dept: FAMILY MEDICINE CLINIC | Facility: CLINIC | Age: 68
End: 2018-11-02

## 2018-11-02 NOTE — TELEPHONE ENCOUNTER
Patient called stating he would like a RX for a glucometer, and supplies to test his sugar  Patient uses [facility] in [place]  Please advise patient at [phone]

## 2018-11-09 ENCOUNTER — OFFICE VISIT (OUTPATIENT)
Dept: OBGYN CLINIC | Facility: HOSPITAL | Age: 68
End: 2018-11-09
Payer: COMMERCIAL

## 2018-11-09 VITALS
SYSTOLIC BLOOD PRESSURE: 119 MMHG | HEART RATE: 60 BPM | HEIGHT: 66 IN | DIASTOLIC BLOOD PRESSURE: 68 MMHG | WEIGHT: 202 LBS | BODY MASS INDEX: 32.47 KG/M2

## 2018-11-09 DIAGNOSIS — M65.342 TRIGGER RING FINGER OF LEFT HAND: Primary | ICD-10-CM

## 2018-11-09 DIAGNOSIS — M79.645 FINGER PAIN, LEFT: ICD-10-CM

## 2018-11-09 PROCEDURE — 99243 OFF/OP CNSLTJ NEW/EST LOW 30: CPT | Performed by: SURGERY

## 2018-11-09 PROCEDURE — 20550 NJX 1 TENDON SHEATH/LIGAMENT: CPT | Performed by: SURGERY

## 2018-11-09 RX ORDER — LIDOCAINE HYDROCHLORIDE 10 MG/ML
1 INJECTION, SOLUTION INFILTRATION; PERINEURAL
Status: COMPLETED | OUTPATIENT
Start: 2018-11-09 | End: 2018-11-09

## 2018-11-09 RX ORDER — DEXAMETHASONE SODIUM PHOSPHATE 100 MG/10ML
40 INJECTION INTRAMUSCULAR; INTRAVENOUS
Status: COMPLETED | OUTPATIENT
Start: 2018-11-09 | End: 2018-11-09

## 2018-11-09 RX ADMIN — LIDOCAINE HYDROCHLORIDE 1 ML: 10 INJECTION, SOLUTION INFILTRATION; PERINEURAL at 14:19

## 2018-11-09 RX ADMIN — DEXAMETHASONE SODIUM PHOSPHATE 40 MG: 100 INJECTION INTRAMUSCULAR; INTRAVENOUS at 14:19

## 2018-11-09 NOTE — PROGRESS NOTES
Ethan PEÑALOZA  Attending, Orthopaedic Surgery  Hand, Wrist, and Elbow Surgery  [facility]      ORTHOPAEDIC HAND, WRIST, AND ELBOW OFFICE  VISIT       ASSESSMENT/PLAN:      77 y o  male with a left ring finger trigger finger  Patient would like to proceed with a CSi injection into his left ring finger A1 pulley  It was discussed that if he gets partial relief from this injection he may have 1 more injection  If he gets no relief from the first injection a second injection will most likely not benefit him, and a trigger finger release may be the next step  We will see him back in 6 weeks  The patient verbalized understanding of exam findings and treatment plan  We engaged in the shared decision-making process and treatment options were discussed at length with the patient  Surgical and conservative management discussed today along with risks and benefits  Diagnoses and all orders for this visit:    Finger pain, left    Trigger ring finger of left hand  -     Ambulatory referral to Orthopedic Surgery    Other orders  -     Cancel: XR finger left fourth digit-ring; Future  -     Hand/upper extremity injection        Follow Up:  Return in about 6 weeks (around 12/21/2018)  To Do Next Visit:  Re-evaluation of current issue      General Discussions:  Trigger Finger: The anatomy and physiology of trigger finger was discussed with the patient today in the office  Edema and increased contact pressure within the flexor tendons at the A1 pulley can cause pain, crepitation, and triggering or locking of the digit resulting in limitation of function  Symptoms can occur at anytime but are typically worse in the morning or after a brief rest from repetitive activity    Treatment options include resting/nighttime MP blocking splints to decrease edema, oral anti-inflammatory medications, home or formal therapy exercises, up to 2 steroid injections within the tendon sheath, or surgical release  While majority of patients do respond to conservative treatment, up to 20% may require surgical release      ____________________________________________________________________________________________________________________________________________      CHIEF COMPLAINT:  Chief Complaint   Patient presents with    Left Middle Finger - Pain, Clicking, Locking       SUBJECTIVE:  Radha Bradford is a 77y o  year old RHD male who presents to the office today for left ring finger, clicking, catching and locking  The patient was referred to the office by his family doctor, Dr Downing Screws  The patient states that his left ring finger has been catching, clicking and locking for 6 months  The patient states when his finger locks, it is painful  The patient states that he experiencing the pain over his A1 pulley  The patient describes the pain as being sharp in nature  He denies associated numbness and tingling          Pain/symptom timing:  Worse during the day when active  Pain/symptom context:  Worse with activites and work  Pain/symptom modifying factors:  Rest makes better, activities make worse  Pain/symptom associated signs/symptoms: none    Prior treatment   · NSAIDsNo   · Injections No   · Bracing/Orthotics No    Physical Therapy No     I have personally reviewed all the relevant PMH, PSH, SH, FH, Medications and allergies      PAST MEDICAL HISTORY:  Past Medical History:   Diagnosis Date    Anemia     mild anemia    Sickle cell trait ([address] )     last assessed 10/31/14    Spinal stenosis     ddd spinal stenosis       PAST SURGICAL HISTORY:  Past Surgical History:   Procedure Laterality Date    BACK SURGERY      lower back    COLONOSCOPY  01/2016    due 2026Dr Ezzie Battles  12/07       FAMILY HISTORY:  Family History   Problem Relation Age of Onset    Diabetes Father     Sickle cell anemia Family        SOCIAL HISTORY:  Social History   Substance Use Topics    Smoking status: Never Smoker    Smokeless tobacco: Never Used    Alcohol use No       MEDICATIONS:    Current Outpatient Prescriptions:     amLODIPine (NORVASC) 10 mg tablet, Take 1 tablet by mouth daily, Disp: , Rfl:     aspirin 81 mg chewable tablet, Chew 1 tablet daily, Disp: , Rfl:     hydrochlorothiazide (HYDRODIURIL) 25 mg tablet, Take 1 tablet (25 mg total) by mouth daily, Disp: 90 tablet, Rfl: 3    olmesartan (BENICAR) 40 mg tablet, Take 1 tablet (40 mg total) by mouth daily, Disp: 90 tablet, Rfl: 3    rosuvastatin (CRESTOR) 20 MG tablet, Take 1 tablet by mouth daily, Disp: , Rfl:     ALLERGIES:  Allergies   Allergen Reactions    Levofloxacin      Other reaction(s): Unknown Allergic Reaction    Amlodipine Rash           REVIEW OF SYSTEMS:  Review of Systems   Constitutional: Negative for chills, fever and unexpected weight change  HENT: Positive for hearing loss  Negative for nosebleeds and sore throat  Eyes: Positive for visual disturbance  Negative for pain and redness  Respiratory: Negative for cough, shortness of breath and wheezing  Cardiovascular: Positive for leg swelling  Negative for chest pain and palpitations  Gastrointestinal: Negative for abdominal pain, nausea and vomiting  Endocrine: Negative for polydipsia and polyuria  Genitourinary: Negative for difficulty urinating and hematuria  Musculoskeletal: Positive for arthralgias and joint swelling  Negative for myalgias  Skin: Negative for rash and wound  Neurological: Positive for numbness ( leg) and headaches  Negative for dizziness  Psychiatric/Behavioral: Negative for decreased concentration, dysphoric mood and suicidal ideas  The patient is not nervous/anxious          VITALS:  Vitals:    11/09/18 1355   BP: 119/68   Pulse: 60       LABS:  HgA1c:   Lab Results   Component Value Date    HGBA1C 6 7 (H) 10/23/2018     BMP:   Lab Results   Component Value Date    CALCIUM 8 9 10/23/2018     10/27/2017    K 4 0 10/23/2018    CO2 27 10/23/2018  10/23/2018    BUN 12 10/23/2018    CREATININE 1 05 09/11/2018       _____________________________________________________  PHYSICAL EXAMINATION:  General: well developed and well nourished, alert, oriented times 3 and appears comfortable  Psychiatric: Normal  HEENT: Normocephalic, Atraumatic Trachea Midline, No torticollis  Pulmonary: No audible wheezing or respiratory distress   Cardiovascular: No pitting edema, 2+ radial pulse   Skin: No masses, erthema, lacerations, fluctation, ulcerations  Neurovascular: Sensation Intact to the Median, Ulnar, Radial Nerve, Motor Intact to the Median, Ulnar, Radial Nerve and Pulses Intact  Musculoskeletal: Normal, except as noted in detailed exam and in HPI  MUSCULOSKELETAL EXAMINATION:  Left ring finger:    left ring finger:  Positive palpable nodule over the A1 pulley  Positive tenderness to palpation over A1 pulley  Positive catching  Positive clicking      No other a 1 pulley tenderness to palpation  Full composite fist  No pain about the [facility]  ___________________________________________________  STUDIES REVIEWED:  No imaging to review today          PROCEDURES PERFORMED:  Hand/upper extremity injection  Date/Time: 11/9/2018 2:19 PM  Consent given by: patient  Site marked: site marked  Timeout: Immediately prior to procedure a time out was called to verify the correct patient, procedure, equipment, support staff and site/side marked as required   Supporting Documentation  Indications: pain   Procedure Details  Condition:trigger finger Location: ring finger - L ring A1   Preparation: Patient was prepped and draped in the usual sterile fashion  Needle size: 25 G  Ultrasound guidance: no  Medications administered: 1 mL lidocaine 1 %; 40 mg dexamethasone 100 mg/10 mL  Patient tolerance: patient tolerated the procedure well with no immediate complications  Dressing:  Sterile dressing applied            _____________________________________________________      [employer] Attestation    I,:   Carlos Darximena am acting as a scribe while in the presence of the attending physician :        I,:   Antoinette Sutherland MD personally performed the services described in this documentation    as scribed in my presence :

## 2018-11-09 NOTE — LETTER
November 9, 2018     Geovanni Coronado MD  [facility] [address]    Patient: Javan Jean   YOB: 1950   Date of Visit: 11/9/2018       Dear Dr Shani Mills: Thank you for referring Javan Jean to me for evaluation  Below are my notes for this consultation  If you have questions, please do not hesitate to call me  I look forward to following your patient along with you  Sincerely,        Roberto Santos MD        CC: No Recipients    Roberto PEÑALOZA  Attending, Orthopaedic Surgery  Hand, Wrist, and Elbow Surgery  [facility]      ORTHOPAEDIC HAND, WRIST, AND ELBOW OFFICE  VISIT       ASSESSMENT/PLAN:      77 y o  male with a left ring finger trigger finger  Patient would like to proceed with a CSi injection into his left ring finger A1 pulley  It was discussed that if he gets partial relief from this injection he may have 1 more injection  If he gets no relief from the first injection a second injection will most likely not benefit him, and a trigger finger release may be the next step  We will see him back in 6 weeks  The patient verbalized understanding of exam findings and treatment plan  We engaged in the shared decision-making process and treatment options were discussed at length with the patient  Surgical and conservative management discussed today along with risks and benefits  Diagnoses and all orders for this visit:    Finger pain, left    Trigger ring finger of left hand  -     Ambulatory referral to Orthopedic Surgery    Other orders  -     Cancel: XR finger left fourth digit-ring; Future  -     Hand/upper extremity injection        Follow Up:  Return in about 6 weeks (around 12/21/2018)  To Do Next Visit:  Re-evaluation of current issue      General Discussions:  Trigger Finger: The anatomy and physiology of trigger finger was discussed with the patient today in the office    Edema and increased contact pressure within the flexor tendons at the A1 pulley can cause pain, crepitation, and triggering or locking of the digit resulting in limitation of function  Symptoms can occur at anytime but are typically worse in the morning or after a brief rest from repetitive activity  Treatment options include resting/nighttime MP blocking splints to decrease edema, oral anti-inflammatory medications, home or formal therapy exercises, up to 2 steroid injections within the tendon sheath, or surgical release  While majority of patients do respond to conservative treatment, up to 20% may require surgical release      ____________________________________________________________________________________________________________________________________________      CHIEF COMPLAINT:  Chief Complaint   Patient presents with    Left Middle Finger - Pain, Clicking, Locking       SUBJECTIVE:  Johnny Joseph is a 77y o  year old RHD male who presents to the office today for left ring finger, clicking, catching and locking  The patient was referred to the office by his family doctor, Dr Ruth Black  The patient states that his left ring finger has been catching, clicking and locking for 6 months  The patient states when his finger locks, it is painful  The patient states that he experiencing the pain over his A1 pulley  The patient describes the pain as being sharp in nature  He denies associated numbness and tingling          Pain/symptom timing:  Worse during the day when active  Pain/symptom context:  Worse with activites and work  Pain/symptom modifying factors:  Rest makes better, activities make worse  Pain/symptom associated signs/symptoms: none    Prior treatment   · NSAIDsNo   · Injections No   · Bracing/Orthotics No    Physical Therapy No     I have personally reviewed all the relevant PMH, PSH, SH, FH, Medications and allergies      PAST MEDICAL HISTORY:  Past Medical History:   Diagnosis Date    Anemia     mild anemia  Sickle cell trait ([facility] [address] )     last assessed 10/31/14    Spinal stenosis     ddd spinal stenosis       PAST SURGICAL HISTORY:  Past Surgical History:   Procedure Laterality Date    BACK SURGERY      lower back    COLONOSCOPY  01/2016    due 2026, Keshav Hernandez  12/07       FAMILY HISTORY:  Family History   Problem Relation Age of Onset    Diabetes Father     Sickle cell anemia Family        SOCIAL HISTORY:  Social History   Substance Use Topics    Smoking status: Never Smoker    Smokeless tobacco: Never Used    Alcohol use No       MEDICATIONS:    Current Outpatient Prescriptions:     amLODIPine (NORVASC) 10 mg tablet, Take 1 tablet by mouth daily, Disp: , Rfl:     aspirin 81 mg chewable tablet, Chew 1 tablet daily, Disp: , Rfl:     hydrochlorothiazide (HYDRODIURIL) 25 mg tablet, Take 1 tablet (25 mg total) by mouth daily, Disp: 90 tablet, Rfl: 3    olmesartan (BENICAR) 40 mg tablet, Take 1 tablet (40 mg total) by mouth daily, Disp: 90 tablet, Rfl: 3    rosuvastatin (CRESTOR) 20 MG tablet, Take 1 tablet by mouth daily, Disp: , Rfl:     ALLERGIES:  Allergies   Allergen Reactions    Levofloxacin      Other reaction(s): Unknown Allergic Reaction    Amlodipine Rash           REVIEW OF SYSTEMS:  Review of Systems   Constitutional: Negative for chills, fever and unexpected weight change  HENT: Positive for hearing loss  Negative for nosebleeds and sore throat  Eyes: Positive for visual disturbance  Negative for pain and redness  Respiratory: Negative for cough, shortness of breath and wheezing  Cardiovascular: Positive for leg swelling  Negative for chest pain and palpitations  Gastrointestinal: Negative for abdominal pain, nausea and vomiting  Endocrine: Negative for polydipsia and polyuria  Genitourinary: Negative for difficulty urinating and hematuria  Musculoskeletal: Positive for arthralgias and joint swelling  Negative for myalgias  Skin: Negative for rash and wound     Neurological: Positive for numbness ( leg) and headaches  Negative for dizziness  Psychiatric/Behavioral: Negative for decreased concentration, dysphoric mood and suicidal ideas  The patient is not nervous/anxious  VITALS:  Vitals:    11/09/18 1355   BP: 119/68   Pulse: 60       LABS:  HgA1c:   Lab Results   Component Value Date    HGBA1C 6 7 (H) 10/23/2018     BMP:   Lab Results   Component Value Date    CALCIUM 8 9 10/23/2018     10/27/2017    K 4 0 10/23/2018    CO2 27 10/23/2018     10/23/2018    BUN 12 10/23/2018    CREATININE 1 05 09/11/2018       _____________________________________________________  PHYSICAL EXAMINATION:  General: well developed and well nourished, alert, oriented times 3 and appears comfortable  Psychiatric: Normal  HEENT: Normocephalic, Atraumatic Trachea Midline, No torticollis  Pulmonary: No audible wheezing or respiratory distress   Cardiovascular: No pitting edema, 2+ radial pulse   Skin: No masses, erthema, lacerations, fluctation, ulcerations  Neurovascular: Sensation Intact to the Median, Ulnar, Radial Nerve, Motor Intact to the Median, Ulnar, Radial Nerve and Pulses Intact  Musculoskeletal: Normal, except as noted in detailed exam and in HPI  MUSCULOSKELETAL EXAMINATION:  Left ring finger:    left ring finger:  Positive palpable nodule over the A1 pulley  Positive tenderness to palpation over A1 pulley  Positive catching  Positive clicking      No other a 1 pulley tenderness to palpation  Full composite fist  No pain about the [facility]  ___________________________________________________  STUDIES REVIEWED:  No imaging to review today          PROCEDURES PERFORMED:  Hand/upper extremity injection  Date/Time: 11/9/2018 2:19 PM  Consent given by: patient  Site marked: site marked  Timeout: Immediately prior to procedure a time out was called to verify the correct patient, procedure, equipment, support staff and site/side marked as required   Supporting Documentation  Indications: pain   Procedure Details  Condition:trigger finger Location: ring finger - L ring A1   Preparation: Patient was prepped and draped in the usual sterile fashion  Needle size: 25 G  Ultrasound guidance: no  Medications administered: 1 mL lidocaine 1 %; 40 mg dexamethasone 100 mg/10 mL  Patient tolerance: patient tolerated the procedure well with no immediate complications  Dressing:  Sterile dressing applied            _____________________________________________________      Scribe Attestation    I,:   Gurdeep Lee am acting as a scribe while in the presence of the attending physician :        I,:   Wilfrido Sanches MD personally performed the services described in this documentation    as scribed in my presence :                    Attestation signed by Wilfrido Sanches MD at 11/9/2018  2:27 PM:  I saw and examined the patient personally and agree with my physician extender's note and plan   I formulated  the plan and gave  explicit instructions to the patient

## 2018-12-05 NOTE — PROGRESS NOTES
Cardiology Consultation     Gabriella oSlorio  [id number]  1950  HEART & VASCULAR [facility] [address]  1  Complete left bundle branch block (LBBB)  Ambulatory referral to Cardiology   2  Benign essential hypertension  Ambulatory referral to Cardiology   3  Pure hypercholesterolemia       Patient Active Problem List   Diagnosis    Anemia    Arthralgia of knee, left    Benign enlargement of prostate    Benign essential hypertension    Complete left bundle branch block (LBBB)    Radiculopathy affecting upper extremity    Sickle cell trait ([facility] [address]    Spinal stenosis    Pharyngitis    Mild obstructive sleep apnea    Type 2 diabetes, diet controlled ([facility] [address] )       HPI patient is here for a cardiology assessment  His most recent echocardiogram done in October of this year demonstrated preserved LV systolic function and no significant valvular heart disease  He has a history of essential hypertension, hyperlipidemia, left bundle branch block and obstructive sleep apnea  He had complained of palpitations to his primary care physician and a Holter monitor was ordered but not accomplished by the patient  Patient relates prior history of having a stress test done at [facility] and at that time declined cardiac catheterization  Patient has had prior cardiology care at [facility]  He had a exercise nuclear stress test done June 26, 2017 which showed no evidence of prognostically important ischemia  He had an echocardiogram as well done that day which demonstrated preserved LV systolic function with mild LVH  He had had a Mariana Oneil done June 23rd of that year and had prolonged chest discomfort with shortness of breath without EKG changes    Based on this test result he went on to have recommendation to get cardiac catheterization but the patient went on to have the exercise test which showed no obvious ischemia  EKG done September 11/2018 demonstrated sinus rhythm with a left bundle branch block  He had an episode of chest pain which prompted an emergency room visit in September but is workup was negative at that time  He has had no further issue  He has been well  He is switching to our practice  He has eight children and eight grandchildren  His blood pressure is well controlled today  PMH-  Past Medical History:   Diagnosis Date    Anemia     mild anemia    Sickle cell trait ([address] )     last assessed 10/31/14    Spinal stenosis     ddd spinal stenosis        SOCIAL HISTORY-  Social History     Social History    Marital status: /Civil Union     Spouse name: N/A    Number of children: N/A    Years of education: N/A     Occupational History    Not on file       Social History Main Topics    Smoking status: Never Smoker    Smokeless tobacco: Never Used    Alcohol use No    Drug use: No    Sexual activity: Not on file     Other Topics Concern    Not on file     Social History Narrative    No narrative on file        FAMILY HISTORY-  Family History   Problem Relation Age of Onset    Diabetes Father     Sickle cell anemia Family        SURGICAL HISTORY-  Past Surgical History:   Procedure Laterality Date    BACK SURGERY      lower back    COLONOSCOPY  01/2016    due 2026, Víctor Hernandez  12/07         Current Outpatient Prescriptions:     amLODIPine (NORVASC) 10 mg tablet, Take 1 tablet by mouth daily, Disp: , Rfl:     aspirin 81 mg chewable tablet, Chew 1 tablet daily, Disp: , Rfl:     olmesartan (BENICAR) 40 mg tablet, Take 1 tablet (40 mg total) by mouth daily, Disp: 90 tablet, Rfl: 3    rosuvastatin (CRESTOR) 20 MG tablet, Take 1 tablet by mouth daily, Disp: , Rfl:   Allergies   Allergen Reactions    Levofloxacin      Other reaction(s): Unknown Allergic Reaction    Amlodipine Rash     Vitals:    12/07/18 0831   BP: 132/52   BP Location: Right arm   Patient Position: Sitting   Cuff Size: Large   Pulse: 61   Weight: 87 1 kg (192 lb)   Height: 5' 6" (1 676 m)         Review of Systems:  Review of Systems   All other systems reviewed and are negative  Physical Exam:  Physical Exam   Constitutional: He is oriented to person, place, and time  He appears well-developed and well-nourished  HENT:   Head: Normocephalic and atraumatic  Eyes: Pupils are equal, round, and reactive to light  Conjunctivae are normal    Neck: Normal range of motion  Neck supple  Cardiovascular: Normal rate and normal heart sounds  Pulmonary/Chest: Effort normal and breath sounds normal    Neurological: He is alert and oriented to person, place, and time  Skin: Skin is warm and dry  Psychiatric: He has a normal mood and affect  Vitals reviewed  Discussion/Summary:I will continue the patient's current medical regimen  The patient appears well compensated  I have asked the patient to call if there is a problem in the interim otherwise I will see the patient in six months time  The patient is due for an echo prior to the next visit to assess LV wall thickness and systolic function

## 2018-12-07 ENCOUNTER — OFFICE VISIT (OUTPATIENT)
Dept: CARDIOLOGY CLINIC | Facility: CLINIC | Age: 68
End: 2018-12-07
Payer: COMMERCIAL

## 2018-12-07 VITALS
HEART RATE: 61 BPM | DIASTOLIC BLOOD PRESSURE: 52 MMHG | SYSTOLIC BLOOD PRESSURE: 132 MMHG | BODY MASS INDEX: 30.86 KG/M2 | WEIGHT: 192 LBS | HEIGHT: 66 IN

## 2018-12-07 DIAGNOSIS — I10 BENIGN ESSENTIAL HYPERTENSION: ICD-10-CM

## 2018-12-07 DIAGNOSIS — E78.00 PURE HYPERCHOLESTEROLEMIA: ICD-10-CM

## 2018-12-07 DIAGNOSIS — I44.7 COMPLETE LEFT BUNDLE BRANCH BLOCK (LBBB): Primary | ICD-10-CM

## 2018-12-07 PROCEDURE — 99244 OFF/OP CNSLTJ NEW/EST MOD 40: CPT | Performed by: INTERNAL MEDICINE

## 2018-12-18 ENCOUNTER — TELEPHONE (OUTPATIENT)
Dept: FAMILY MEDICINE CLINIC | Facility: CLINIC | Age: 68
End: 2018-12-18

## 2018-12-18 NOTE — TELEPHONE ENCOUNTER
Patient states he needs meninginitis injections for himself, his wife, & his son  States his son needs it for school but they also need it because they have people visiting  They are all your patients  The would like to come in later today for the injection if possible       Marjorie Burnham    11/29/1960  Aleja Ivory    4/27/2003

## 2018-12-18 NOTE — TELEPHONE ENCOUNTER
Please contact patient  There is no indication for meningococcus vaccine for him or his wife  Please triage, I am not quite sure why with patient need meningitis vaccine in the have visitors? Is there any exposure to acute meningitis? ?    We do not routinely recommended meningococcus vaccination for adults over 54years old unless the lacking spleen or have significant immunity deficiency  If patient  insists on having meningococcus vaccine done -  then he cannot have Menactra as it only approved for people less than 55  but  He can have another vaccine for meningitis Menomunel  that is approved for people over 55  ( we do not carry, but pharmacies may have it ? ?)    Please see separate task regarding patient's son

## 2019-01-10 ENCOUNTER — OFFICE VISIT (OUTPATIENT)
Dept: FAMILY MEDICINE CLINIC | Facility: CLINIC | Age: 69
End: 2019-01-10
Payer: COMMERCIAL

## 2019-01-10 VITALS
DIASTOLIC BLOOD PRESSURE: 60 MMHG | SYSTOLIC BLOOD PRESSURE: 120 MMHG | HEIGHT: 66 IN | TEMPERATURE: 95.6 F | HEART RATE: 60 BPM | WEIGHT: 189 LBS | BODY MASS INDEX: 30.37 KG/M2 | OXYGEN SATURATION: 99 % | RESPIRATION RATE: 16 BRPM

## 2019-01-10 DIAGNOSIS — R20.2 PARESTHESIAS: ICD-10-CM

## 2019-01-10 DIAGNOSIS — J40 BRONCHITIS: Primary | ICD-10-CM

## 2019-01-10 PROBLEM — J02.9 PHARYNGITIS: Status: RESOLVED | Noted: 2018-06-29 | Resolved: 2019-01-10

## 2019-01-10 PROCEDURE — 1036F TOBACCO NON-USER: CPT | Performed by: NURSE PRACTITIONER

## 2019-01-10 PROCEDURE — 3008F BODY MASS INDEX DOCD: CPT | Performed by: NURSE PRACTITIONER

## 2019-01-10 PROCEDURE — 99213 OFFICE O/P EST LOW 20 MIN: CPT | Performed by: NURSE PRACTITIONER

## 2019-01-10 PROCEDURE — 1101F PT FALLS ASSESS-DOCD LE1/YR: CPT | Performed by: NURSE PRACTITIONER

## 2019-01-10 PROCEDURE — 1160F RVW MEDS BY RX/DR IN RCRD: CPT | Performed by: NURSE PRACTITIONER

## 2019-01-10 RX ORDER — METHYLPREDNISOLONE 4 MG/1
TABLET ORAL
Qty: 21 TABLET | Refills: 0 | Status: SHIPPED | OUTPATIENT
Start: 2019-01-10 | End: 2019-09-05

## 2019-01-10 RX ORDER — HYDROCHLOROTHIAZIDE 25 MG/1
25 TABLET ORAL DAILY
Refills: 3 | COMMUNITY
Start: 2018-12-26 | End: 2019-11-21 | Stop reason: SDUPTHER

## 2019-01-10 RX ORDER — LANCETS 33 GAUGE
EACH MISCELLANEOUS DAILY
Refills: 3 | COMMUNITY
Start: 2018-11-02 | End: 2020-09-16 | Stop reason: SDUPTHER

## 2019-01-10 RX ORDER — BLOOD-GLUCOSE METER
EACH MISCELLANEOUS
Refills: 0 | COMMUNITY
Start: 2018-11-02

## 2019-01-10 NOTE — PROGRESS NOTES
FAMILY PRACTICE OFFICE VISIT       NAME: Marlon Lechuga  AGE: 77 y o  SEX: male       : 1950        MRN: [id number]    DATE: 1/10/2019    Assessment and Plan     Problem List Items Addressed This Visit     None      Visit Diagnoses     Bronchitis    -  Primary    Relevant Medications    Methylprednisolone 4 MG TBPK    Paresthesias              1  Bronchitis  Methylprednisolone 4 MG TBPK   2  Paresthesias       This is a 75-year-old male presenting today for persistent cough and chest tightness  He was traveling in the [place], when he became sick with upper respiratory symptoms  He was treated in a hospital and given several medications  One was an antibiotic, which she does not recall the name  The others were paracetamol, cetirizine, and Amydramine  He completed the antibiotic as prescribed  He has stopped taking all of the other medications as well  All other cold symptoms resolved except for persistent cough and chest tightness  Exam does not show any signs suspicious for infection, therefore will try Medrol Dosepak to help alleviate symptoms of cough and chest tightness  Take this medication with food in the morning  He will call if his symptoms are not improving by Monday, , or if for any reason symptoms appear to be worsening  While he was sick he was also experiencing paresthesias of his bilateral lower extremities  He was also having difficulty walking  He typically has some left leg numbness the after low back surgery, which is normal for him  He was concerned because he was experiencing right lower extremity numbness, as well as difficulty walking  He has since stopped taking all his medications, cold symptoms have improved, numbness in bilateral lower extremities improved and he has no difficulty walking  No numbness or tingling in right lower extremity, left lower extremity is at baseline   Discussed with patient it is possible that symptoms were related to either his illness or medication he was taking  Patient is agreeable to watching and waiting at this time, he will call if symptoms recur  Chief Complaint     Chief Complaint   Patient presents with    Numbness     Patient is here due to b/l feet numbness for 1 week  History of Present Illness     Sukhi Garcia is a 77year old man presenting today for follow-up on recent symptoms  He was traveling in the [place] and returned to the [facility]  While he was traveling he became sick with upper respiratory symptoms and was given medication at a hospital   He states 1 of the medications was an antibiotic which he completed, but does not know the name of this medication  He has brought other medications that were prescribed with him today, which include paracetamol, cetirizine, and Amydramine  Ingredients listed on Amydramine bottle include Diphenhydramine, Ammonium Chloride, Sodium Chloride, and Menthol  His upper respiratory symptoms improved, and he stopped taking all three of these medications  When he was sick he felt bilateral lower extremity numbness and had difficulty walking  This has resolved  He has some residual left leg numbness, but states this is normal for him since he had back surgery  He was concerned because he was experiencing symptoms in his right leg, which he has never had  He was not experiencing any back pain, and had no injuries  Currently he has no numbness or tingling in his right leg, and has no difficulty ambulating  His cold symptoms of nasal congestion, rhinorrhea, sore throat, fever, chills, and sweats are resolved  States he finished the antibiotic 4-5 days ago  He has been sick for a total of 9 days now  He continues to have a cough, some chest tightness at times, and feeling short of breath with coughing spells  Denies wheezing or chest pain  Review of Systems   Review of Systems   Constitutional: Positive for fatigue   Negative for chills, diaphoresis and fever  HENT: Negative for congestion, ear pain, postnasal drip, rhinorrhea, sinus pressure and sore throat  Respiratory: Positive for cough, chest tightness and shortness of breath  Negative for wheezing  Cardiovascular: Negative for chest pain, palpitations and leg swelling  Gastrointestinal: Negative  Genitourinary: Negative  Skin: Negative  Neurological: Positive for numbness  Negative for dizziness and headaches  Speech difficulty:  in HPI  Active Problem List     Patient Active Problem List   Diagnosis    Anemia    Arthralgia of knee, left    Benign enlargement of prostate    Benign essential hypertension    Complete left bundle branch block (LBBB)    Radiculopathy affecting upper extremity    [facility] trait [facility]    Spinal stenosis    Mild obstructive sleep apnea    Type 2 diabetes, diet controlled ([facility] )       Past Medical History:  Past Medical History:   Diagnosis Date    Anemia     mild anemia    Back pain     Heart burn     History of chest pain     Hypertension     Palpitations     Problems with hearing     Problems with swallowing     Sickle cell trait ([facility] )     last assessed 10/31/14    Snoring     SOB (shortness of breath)     Spinal stenosis     ddd spinal stenosis    Swelling        Past Surgical History:  Past Surgical History:   Procedure Laterality Date    BACK SURGERY      lower back    COLONOSCOPY  01/2016    due 2026, Malachi Hernandez  12/07       Family History:  Family History   Problem Relation Age of Onset    Diabetes Father     Sickle cell anemia Family     Diabetes Sister     Cancer Brother        Social History:  Social History     Social History    Marital status: /Civil Union     Spouse name: N/A    Number of children: N/A    Years of education: N/A     Occupational History    Not on file       Social History Main Topics    Smoking status: Never Smoker    Smokeless tobacco: Never Used    Alcohol use No    Drug use: No    Sexual activity: Not on file     Other Topics Concern    Not on file     Social History Narrative    No narrative on file       I have reviewed the patient's medical history in detail; there are no changes to the history as noted in the electronic medical record  Objective     Vitals:    01/10/19 0922 01/10/19 1003   BP: 120/60    Pulse: 60    Resp: 16    Temp: (!) 95 6 °F (35 3 °C)    TempSrc: Tympanic    SpO2:  99%   Weight: 85 7 kg (189 lb)    Height: 5' 6" (1 676 m)      Wt Readings from Last 3 Encounters:   01/10/19 85 7 kg (189 lb)   12/07/18 87 1 kg (192 lb)   11/09/18 91 6 kg (202 lb)     Body mass index is 30 51 kg/m²  PHQ-9 Depression Screening    PHQ-9:    Frequency of the following problems over the past two weeks:       Little interest or pleasure in doing things:  0 - not at all  Feeling down, depressed, or hopeless:  0 - not at all  PHQ-2 Score:  0       Physical Exam   Constitutional: He is oriented to person, place, and time  He appears well-developed and well-nourished  No distress  HENT:   Head: Normocephalic and atraumatic  Right Ear: Tympanic membrane and ear canal normal    Left Ear: Tympanic membrane and ear canal normal    Nose: Mucosal edema and rhinorrhea present  Mouth/Throat: Posterior oropharyngeal erythema present  No oropharyngeal exudate  Eyes: Conjunctivae are normal    Neck: Normal range of motion  Neck supple  Cardiovascular: Normal rate, regular rhythm and normal heart sounds  No murmur heard  Pulmonary/Chest: Effort normal and breath sounds normal    Dry cough   Musculoskeletal: He exhibits no edema  Lymphadenopathy:     He has no cervical adenopathy  Neurological: He is alert and oriented to person, place, and time  He exhibits normal muscle tone  Skin: No rash noted  Psychiatric: He has a normal mood and affect  Nursing note and vitals reviewed        ALLERGIES:  Allergies   Allergen Reactions    Levofloxacin      Other reaction(s): Unknown Allergic Reaction    Amlodipine Rash       Current Medications     Current Outpatient Prescriptions   Medication Sig Dispense Refill    amLODIPine (NORVASC) 10 mg tablet Take 1 tablet by mouth daily      aspirin 81 mg chewable tablet Chew 1 tablet daily      Blood Glucose Monitoring Suppl (ONE TOUCH ULTRA 2) w/Device KIT Use as directed  0    hydrochlorothiazide (HYDRODIURIL) 25 mg tablet Take 25 mg by mouth daily  3    olmesartan (BENICAR) 40 mg tablet Take 1 tablet (40 mg total) by mouth daily 90 tablet 3    ONE TOUCH ULTRA TEST test strip daily  3    ONETOUCH DELICA LANCETS 27R MISC daily  3    rosuvastatin (CRESTOR) 20 MG tablet Take 1 tablet by mouth daily      Methylprednisolone 4 MG TBPK Use as directed on package 21 tablet 0     No current facility-administered medications for this visit            Health Maintenance     Health Maintenance   Topic Date Due    Hepatitis C Screening  1950    Diabetic Foot Exam  03/08/1958    DM Eye Exam  03/08/1958    URINE MICROALBUMIN  03/08/1958    DTaP,Tdap,and Td Vaccines (1 - Tdap) 09/10/2019 (Originally 3/8/1969)    HEMOGLOBIN A1C  04/23/2019    Fall Risk  01/10/2020    Depression Screening PHQ  01/10/2020    CRC Screening: Colonoscopy  01/05/2026    INFLUENZA VACCINE  Completed    Pneumococcal PPSV23/PCV13 65+ Years / High and Highest Risk  Completed     Immunization History   Administered Date(s) Administered    Influenza 09/20/2018    Influenza Split High Dose Preservative Free IM 10/14/2016, 10/27/2017    Influenza TIV (IM) 10/12/2007, 09/25/2009, 01/17/2014, 10/22/2014    Influenza, high dose seasonal 0 5 mL 09/20/2018    Pneumococcal Conjugate 13-Valent 10/14/2016    Pneumococcal Polysaccharide PPV23 04/26/2013       YISEL No

## 2019-09-03 ENCOUNTER — TELEPHONE (OUTPATIENT)
Dept: FAMILY MEDICINE CLINIC | Facility: CLINIC | Age: 69
End: 2019-09-03

## 2019-09-03 DIAGNOSIS — I10 BENIGN ESSENTIAL HYPERTENSION: Primary | ICD-10-CM

## 2019-09-03 RX ORDER — LOSARTAN POTASSIUM 100 MG/1
100 TABLET ORAL DAILY
COMMUNITY
Start: 2019-09-03 | End: 2019-09-05

## 2019-09-03 RX ORDER — VALSARTAN 320 MG/1
320 TABLET ORAL DAILY
Qty: 90 TABLET | Refills: 1 | Status: SHIPPED | OUTPATIENT
Start: 2019-09-03 | End: 2019-11-26

## 2019-09-03 NOTE — TELEPHONE ENCOUNTER
Patient called and stated that his Irbesartan is on back order and needs something else sent over to his pharmacy    Please call to advise

## 2019-09-03 NOTE — TELEPHONE ENCOUNTER
I will switch to Valsartan 320 mg daily   I am not quite sure why Losartan is on his med list, he should not be taking this medication  Thank you

## 2019-09-05 ENCOUNTER — OFFICE VISIT (OUTPATIENT)
Dept: FAMILY MEDICINE CLINIC | Facility: CLINIC | Age: 69
End: 2019-09-05
Payer: COMMERCIAL

## 2019-09-05 VITALS
BODY MASS INDEX: 32.21 KG/M2 | TEMPERATURE: 96.6 F | WEIGHT: 200.4 LBS | HEIGHT: 66 IN | OXYGEN SATURATION: 96 % | SYSTOLIC BLOOD PRESSURE: 130 MMHG | HEART RATE: 59 BPM | DIASTOLIC BLOOD PRESSURE: 70 MMHG | RESPIRATION RATE: 16 BRPM

## 2019-09-05 DIAGNOSIS — Z12.5 ENCOUNTER FOR PROSTATE CANCER SCREENING: ICD-10-CM

## 2019-09-05 DIAGNOSIS — E78.5 HYPERLIPIDEMIA, UNSPECIFIED HYPERLIPIDEMIA TYPE: ICD-10-CM

## 2019-09-05 DIAGNOSIS — M48.07 SPINAL STENOSIS OF LUMBOSACRAL REGION: ICD-10-CM

## 2019-09-05 DIAGNOSIS — D57.3 SICKLE CELL TRAIT (HCC): ICD-10-CM

## 2019-09-05 DIAGNOSIS — I10 BENIGN ESSENTIAL HYPERTENSION: ICD-10-CM

## 2019-09-05 DIAGNOSIS — I44.7 COMPLETE LEFT BUNDLE BRANCH BLOCK (LBBB): ICD-10-CM

## 2019-09-05 DIAGNOSIS — E78.49 OTHER HYPERLIPIDEMIA: ICD-10-CM

## 2019-09-05 DIAGNOSIS — M54.16 LUMBAR RADICULOPATHY: ICD-10-CM

## 2019-09-05 DIAGNOSIS — L80 VITILIGO: ICD-10-CM

## 2019-09-05 DIAGNOSIS — Z23 ENCOUNTER FOR IMMUNIZATION: Primary | ICD-10-CM

## 2019-09-05 DIAGNOSIS — E11.9 TYPE 2 DIABETES, DIET CONTROLLED (HCC): ICD-10-CM

## 2019-09-05 PROCEDURE — 3075F SYST BP GE 130 - 139MM HG: CPT | Performed by: FAMILY MEDICINE

## 2019-09-05 PROCEDURE — 3008F BODY MASS INDEX DOCD: CPT | Performed by: FAMILY MEDICINE

## 2019-09-05 PROCEDURE — 99215 OFFICE O/P EST HI 40 MIN: CPT | Performed by: FAMILY MEDICINE

## 2019-09-05 PROCEDURE — 3078F DIAST BP <80 MM HG: CPT | Performed by: FAMILY MEDICINE

## 2019-09-05 RX ORDER — METHOCARBAMOL 750 MG/1
TABLET, FILM COATED ORAL
Qty: 30 TABLET | Refills: 1 | Status: SHIPPED | OUTPATIENT
Start: 2019-09-05 | End: 2020-06-15 | Stop reason: ALTCHOICE

## 2019-09-05 NOTE — PROGRESS NOTES
FAMILY PRACTICE OFFICE VISIT       NAME: Marlon Lechuga  AGE: 68 y o  SEX: male       : 1950        MRN: [id number]        Assessment and Plan     Problem List Items Addressed This Visit        Endocrine    Type 2 diabetes, diet controlled ([facility] [address] )     Lab Results   Component Value Date    HGBA1C 6 7 (H) 10/23/2018 ·    Diet controlled at present time  · Importance of weight loss and low-carbohydrate diet emphasized  · Will proceed with blood work  · Referral for diabetic eye exam    No results for input(s): POCGLU in the last 72 hours  Blood Sugar Average: Last 72 hrs:           Relevant Orders    CBC and differential    Comprehensive metabolic panel    Hemoglobin A1C    TSH, 3rd generation    Ambulatory Referral to Ophthalmology       Cardiovascular and Mediastinum    Benign essential hypertension     · Blood pressure is well controlled, continue valsartan 320 mg daily and Norvasc 10 mg daily  · Patient uses HCTZ 25 mg on a as needed basis only           Relevant Orders    Comprehensive metabolic panel    Complete left bundle branch block (LBBB)     · Patient denies symptoms of angina no dyspnea  · He remains under care of Cardiology at [address]            Other    Sickle cell trait [facility])    Spinal stenosis - Primary    Relevant Medications    methocarbamol (ROBAXIN) 750 mg tablet    Other Relevant Orders    MRI lumbar spine w wo contrast    Ambulatory referral to Physical Therapy    Hyperlipidemia     · Continue Crestor 20 mg daily  · Pending blood work         Relevant Orders    Lipid panel      Other Visit Diagnoses     Vitiligo        Right lower extremity    Relevant Orders    Ambulatory referral to Dermatology    Encounter for prostate cancer screening        Relevant Orders    PSA, Total Screen    Lumbar radiculopathy        Relevant Medications    methocarbamol (ROBAXIN) 750 mg tablet    Other Relevant Orders    MRI lumbar spine w wo contrast    Ambulatory referral to Physical Therapy       Patient presents for evaluation of persistent low back pain  History of multilevel lumbar sacral disc disease, spinal stenosis, history and physical are consistent with neurogenic claudication  Remote history of low back surgery  Will proceed with MRI of lumbar sacral spine with and without contrast   Will determine next treatment step based on those results  Referral to physical therapy  Importance of weight loss emphasized  Patient will continue same daily medications for chronic medical conditions as outlined above  He will proceed with complete blood work  Referral for diabetic eye exam   Referral to Dermatology for evaluation of vitiligo  BMI Counseling: Body mass index is 32 35 kg/m²  Discussed the patient's BMI with him  The BMI is above average  BMI counseling and education was provided to the patient  Nutrition recommendations include reducing portion sizes, decreasing overall calorie intake, 3-5 servings of fruits/vegetables daily, consuming healthier snacks, moderation in carbohydrate intake, increasing intake of lean protein, reducing intake of saturated fat and trans fat and reducing intake of cholesterol  Exercise recommendations include exercising 3-5 times per week  There are no Patient Instructions on file for this visit  Discussed with the patient and all questioned fully answered  He will call me if any problems arise  M*Modal software was used to dictate this note  It may contain errors with dictating incorrect words/spelling  Please contact provider directly with any questions         Chief Complaint     Chief Complaint   Patient presents with    Back Pain     pt is here for lower back pain       History of Present Illness     Patient presents for evaluation of persistent chronic low back pain  Left leg radiculopathy with walking, symptoms develop after 1/2 mile   Pain is worse after prolonged sitting and with walking/getting   No radiculopathy at night or at rest  Low back stiffness with walking, patient feels that he is walking hunched over    Feels that he cannot straighten up with walking    no bowel or bladder dysfunction, no saddle paresthesia   Mild numbness of left thigh, chronic    H/o  Back surgery -  [facility] -  L3-L4 lateral  Discectomy in summer of 2012 - [address] [facility]  Patient saw Suni Cochran in a remote past    Patient denies symptoms of chest pain, palpitations, shortness of breath or dizziness  He remains under care of Cardiology at [address] [place] [address] and [facility]  He uses medications for hypertension and hyperlipidemia  History of type 2 diabetes, diet controlled:  Patient is not up-to-date with diabetic eye exam   No recent blood work  He remains on medications including Cozaar, amlodipine, Crestor and HCTZ    Back Pain   This is a recurrent problem  The current episode started more than 1 year ago  The problem occurs daily  The problem has been gradually worsening since onset  The pain is present in the lumbar spine and sacro-iliac  The quality of the pain is described as aching  The pain radiates to the left thigh and left knee  The pain is moderate  The pain is worse during the day  The symptoms are aggravated by position, standing, twisting and sitting  Stiffness is present all day  Associated symptoms include leg pain and numbness  Pertinent negatives include no abdominal pain, bladder incontinence, bowel incontinence, chest pain, dysuria, fever, paresis, paresthesias, pelvic pain, perianal numbness, tingling, weakness or weight loss  Risk factors include obesity and sedentary lifestyle  He has tried walking, home exercises and analgesics for the symptoms  The treatment provided no relief  Review of Systems   Review of Systems   Constitutional: Negative  Negative for activity change, appetite change, fever and weight loss  HENT: Negative  Eyes: Negative  Respiratory: Negative  Cardiovascular: Negative  Negative for chest pain  Gastrointestinal: Negative  Negative for abdominal distention, abdominal pain and bowel incontinence  Endocrine: Negative  Genitourinary: Negative  Negative for bladder incontinence, dysuria, flank pain, frequency and pelvic pain  Musculoskeletal: Positive for back pain  Skin: Negative  Allergic/Immunologic: Negative  Neurological: Positive for numbness  Negative for tingling, weakness and paresthesias  Hematological: Negative  Psychiatric/Behavioral: Negative          Active Problem List     Patient Active Problem List   Diagnosis    Anemia    Arthralgia of knee, left    Benign enlargement of prostate    Benign essential hypertension    Complete left bundle branch block (LBBB)    Radiculopathy affecting upper extremity    Sickle cell trait ([address] )    Spinal stenosis    Mild obstructive sleep apnea    Type 2 diabetes, diet controlled ([facility] [address] )    Hyperlipidemia       Past Medical History:  Past Medical History:   Diagnosis Date    Anemia     mild anemia    Back pain     Heart burn     History of chest pain     Hypertension     Palpitations     Problems with hearing     Problems with swallowing     Sickle cell trait ([facility] [address] )     last assessed 10/31/14    Snoring     SOB (shortness of breath)     Spinal stenosis     ddd spinal stenosis    Swelling        Past Surgical History:  Past Surgical History:   Procedure Laterality Date    BACK SURGERY      lower back    COLONOSCOPY  01/2016    due 2026, Daniel eHrnandez  12/07       Family History:  Family History   Problem Relation Age of Onset    Diabetes Father     Sickle cell anemia Family     Diabetes Sister     Cancer Brother        Social History:  Social History     Socioeconomic History    Marital status: /Civil Union     Spouse name: Not on file    Number of children: Not on file    Years of education: Not on file    Highest education level: Not on file Occupational History    Not on file   Social Needs    Financial resource strain: Not on file    Food insecurity:     Worry: Not on file     Inability: Not on file    Transportation needs:     Medical: Not on file     Non-medical: Not on file   Tobacco Use    Smoking status: Never Smoker    Smokeless tobacco: Never Used   Substance and Sexual Activity    Alcohol use: No    Drug use: No    Sexual activity: Not on file   Lifestyle    Physical activity:     Days per week: Not on file     Minutes per session: Not on file    Stress: Not on file   Relationships    Social connections:     Talks on phone: Not on file     Gets together: Not on file     Attends Anabaptist service: Not on file     Active member of club or organization: Not on file     Attends meetings of clubs or organizations: Not on file     Relationship status: Not on file    Intimate partner violence:     Fear of current or ex partner: Not on file     Emotionally abused: Not on file     Physically abused: Not on file     Forced sexual activity: Not on file   Other Topics Concern    Not on file   Social History Narrative    Not on file       Objective     Vitals:    09/05/19 0730   BP: 130/70   Pulse: 59   Resp: 16   Temp: (!) 96 6 °F (35 9 °C)   TempSrc: Tympanic   SpO2: 96%   Weight: 90 9 kg (200 lb 6 4 oz)   Height: 5' 6" (1 676 m)     Wt Readings from Last 3 Encounters:   09/05/19 90 9 kg (200 lb 6 4 oz)   01/10/19 85 7 kg (189 lb)   12/07/18 87 1 kg (192 lb)       Physical Exam   Constitutional: He is oriented to person, place, and time  He appears well-developed and well-nourished  HENT:   Head: Normocephalic and atraumatic  Eyes: Conjunctivae are normal    Neck: Neck supple  Carotid bruit is not present  Cardiovascular: Normal rate, regular rhythm and normal heart sounds  Pulses are no weak pulses  No murmur heard  Pulses:       Dorsalis pedis pulses are 2+ on the right side, and 2+ on the left side     Pulmonary/Chest: Effort normal and breath sounds normal  No respiratory distress  He has no wheezes  He has no rales  Abdominal: Bowel sounds are normal  He exhibits no distension and no abdominal bruit  Musculoskeletal: He exhibits no edema  Lumbar back: He exhibits decreased range of motion, tenderness and spasm  Feet:   Right Foot:   Skin Integrity: Positive for dry skin  Negative for ulcer, skin breakdown, erythema, warmth or callus  Left Foot:   Skin Integrity: Positive for dry skin  Negative for ulcer, skin breakdown, erythema, warmth or callus  Neurological: He is alert and oriented to person, place, and time  No cranial nerve deficit  Skin:   Eczema rash dorsal of right foot and anterior ankle  Patch of vitiligo her right lower anterior shin   Psychiatric: He has a normal mood and affect  His behavior is normal    Nursing note and vitals reviewed  Patient's shoes and socks removed  Right Foot/Ankle   Right Foot Inspection  Skin Exam: skin normal, skin intact and dry skin no warmth, no callus, no erythema, no maceration, no abnormal color, no pre-ulcer, no ulcer and no callus                          Toe Exam: ROM and strength within normal limits  Sensory   Vibration: intact  Proprioception: intact   Monofilament testing: intact  Vascular    The right DP pulse is 2+  Left Foot/Ankle  Left Foot Inspection  Skin Exam: skin normal, skin intact and dry skinno warmth, no erythema, no maceration, normal color, no pre-ulcer, no ulcer and no callus                         Toe Exam: ROM and strength within normal limits                   Sensory   Vibration: intact  Proprioception: intact  Monofilament: intact  Vascular    The left DP pulse is 2+  Assign Risk Category:  No deformity present; No loss of protective sensation;  No weak pulses       Risk: 0      Pertinent Laboratory/Diagnostic Studies:  Lab Results   Component Value Date    BUN 12 10/23/2018    CREATININE 1 02 10/23/2018    CALCIUM 8 9 10/23/2018    NA 140 10/27/2017    K 4 0 10/23/2018    CO2 27 10/23/2018     10/23/2018     Lab Results   Component Value Date    ALT 47 09/11/2018    AST 28 09/11/2018    ALKPHOS 82 09/11/2018    BILITOT 0 8 10/27/2017       Lab Results   Component Value Date    WBC 3 97 (L) 09/11/2018    HGB 11 5 (L) 09/11/2018    HCT 32 5 (L) 09/11/2018    MCV 91 09/11/2018     (L) 09/11/2018       Lab Results   Component Value Date    TSH 2 50 10/23/2018       Lab Results   Component Value Date    CHOL 113 10/27/2017     Lab Results   Component Value Date    TRIG 116 10/23/2018     Lab Results   Component Value Date    HDL 45 10/23/2018     No results found for: [facility]  Lab Results   Component Value Date    HGBA1C 6 7 (H) 10/23/2018       Results for orders placed or performed in visit on 10/23/18   Lipid panel   Result Value Ref Range    Total Cholesterol 112 <200 mg/dL    HDL 45 >40 mg/dL    Triglycerides 116 <150 mg/dL    LDL Direct 47 mg/dL (calc)    Chol HDLC Ratio 2 5 <5 0 (calc)    Non-HDL Cholesterol 67 <130 mg/dL (calc)   Basic metabolic panel   Result Value Ref Range    Glucose, Random 163 (H) 65 - 99 mg/dL    BUN 12 7 - 25 mg/dL    Creatinine 1 02 0 70 - 1 18 mg/dL    eGFR Non  74 > OR = 60 mL/min/1 73m2    eGFR  86 > OR = 60 mL/min/1 73m2    SL AMB BUN/CREATININE RATIO NOT APPLICABLE 6 - 22 (calc)    Sodium 138 135 - 146 mmol/L    Potassium 4 0 3 5 - 5 3 mmol/L    Chloride 104 98 - 110 mmol/L    CO2 27 20 - 32 mmol/L    SL AMB CALCIUM 8 9 8 6 - 10 3 mg/dL   CKMB   Result Value Ref Range    CK-MB (CK-2) 3 9 0 - 5 0 ng/mL   TSH, 3rd generation   Result Value Ref Range    TSH 2 50 0 40 - 4 50 mIU/L   PSA, Total Screen   Result Value Ref Range    Prostate Specific Antigen Total 1 1 < OR = 4 0 ng/mL   Hemoglobin A1c (w/out EAG)   Result Value Ref Range    Hemoglobin A1C 6 7 (H) <5 7 % of total Hgb       Orders Placed This Encounter   Procedures    MRI lumbar spine w wo contrast    CBC and differential    Comprehensive metabolic panel    Hemoglobin A1C    TSH, 3rd generation    PSA, Total Screen    Lipid panel    Ambulatory Referral to Ophthalmology    Ambulatory referral to Physical Therapy    Ambulatory referral to Dermatology       ALLERGIES:  Allergies   Allergen Reactions    Amlodipine Rash    Levofloxacin Rash     Other reaction(s): Unknown Allergic Reaction       Current Medications     Current Outpatient Medications   Medication Sig Dispense Refill    amLODIPine (NORVASC) 10 mg tablet Take 1 tablet by mouth daily      aspirin 81 mg chewable tablet Chew 1 tablet daily      Blood Glucose Monitoring Suppl (ONE TOUCH ULTRA 2) w/Device KIT Use as directed  0    hydrochlorothiazide (HYDRODIURIL) 25 mg tablet Take 25 mg by mouth daily  3    ONE TOUCH ULTRA TEST test strip daily  3    ONETOUCH DELICA LANCETS 56S MISC daily  3    rosuvastatin (CRESTOR) 20 MG tablet Take 1 tablet by mouth daily      valsartan (DIOVAN) 320 MG tablet Take 1 tablet (320 mg total) by mouth daily 90 tablet 1    methocarbamol (ROBAXIN) 750 mg tablet Take 1 tablet at bedtime as needed for painful back spasm 30 tablet 1     No current facility-administered medications for this visit          Medications Discontinued During This Encounter   Medication Reason    Methylprednisolone 4 MG TBPK     losartan (COZAAR) 100 MG tablet        Health Maintenance     Health Maintenance   Topic Date Due    Hepatitis C Screening  1950    Diabetic Foot Exam  03/08/1958    DM Eye Exam  03/08/1958    BMI: Followup Plan  03/08/1966    HEPATITIS B VACCINES (1 of 3 - Risk 3-dose series) 03/08/1967    HEMOGLOBIN A1C  04/23/2019    INFLUENZA VACCINE  07/01/2019    DTaP,Tdap,and Td Vaccines (1 - Tdap) 09/10/2019 (Originally 3/8/1969)    Fall Risk  01/10/2020    Depression Screening PHQ  01/10/2020    BMI: Adult  09/05/2020    CRC Screening: Colonoscopy  01/05/2026    Pneumococcal Vaccine: 65+ Years  Completed    Pneumococcal Vaccine: Pediatrics (0 to 5 Years) and At-Risk Patients (6 to 59 Years)  Aged [employer] History   Administered Date(s) Administered    INFLUENZA 09/20/2018    Influenza Split High Dose Preservative Free IM 10/14/2016, 10/27/2017    Influenza TIV (IM) 10/12/2007, 09/25/2009, 01/17/2014, 10/22/2014    Influenza, high dose seasonal 0 5 mL 09/20/2018    Pneumococcal Conjugate 13-Valent 10/14/2016    Pneumococcal Polysaccharide PPV23 04/26/2013       Coleen Flannery MD

## 2019-09-08 PROBLEM — E78.49 OTHER HYPERLIPIDEMIA: Status: ACTIVE | Noted: 2019-09-08

## 2019-09-08 PROBLEM — E78.5 HYPERLIPIDEMIA: Status: ACTIVE | Noted: 2019-09-08

## 2019-09-08 NOTE — ASSESSMENT & PLAN NOTE
· Blood pressure is well controlled, continue valsartan 320 mg daily and Norvasc 10 mg daily  · Patient uses HCTZ 25 mg on a as needed basis only

## 2019-09-08 NOTE — ASSESSMENT & PLAN NOTE
Lab Results   Component Value Date    HGBA1C 6 7 (H) 10/23/2018   ·  Diet controlled at present time  · Importance of weight loss and low-carbohydrate diet emphasized  · Will proceed with blood work  · Referral for diabetic eye exam    No results for input(s): POCGLU in the last 72 hours      Blood Sugar Average: Last 72 hrs:

## 2019-09-08 NOTE — ASSESSMENT & PLAN NOTE
· Patient denies symptoms of angina no dyspnea  · He remains under care of Cardiology at [address] [place] [address]

## 2019-09-10 PROCEDURE — 90662 IIV NO PRSV INCREASED AG IM: CPT

## 2019-09-10 PROCEDURE — 90471 IMMUNIZATION ADMIN: CPT

## 2019-09-18 ENCOUNTER — EVALUATION (OUTPATIENT)
Dept: PHYSICAL THERAPY | Facility: REHABILITATION | Age: 69
End: 2019-09-18
Payer: COMMERCIAL

## 2019-09-18 DIAGNOSIS — M48.07 SPINAL STENOSIS OF LUMBOSACRAL REGION: Primary | ICD-10-CM

## 2019-09-18 DIAGNOSIS — M54.16 LUMBAR RADICULOPATHY: ICD-10-CM

## 2019-09-18 PROCEDURE — 97162 PT EVAL MOD COMPLEX 30 MIN: CPT | Performed by: PHYSICAL THERAPIST

## 2019-09-18 PROCEDURE — 97110 THERAPEUTIC EXERCISES: CPT | Performed by: PHYSICAL THERAPIST

## 2019-09-18 NOTE — PROGRESS NOTES
PT Evaluation     Today's date: 2019  Patient name: Jeny Urena  : 1950  MRN: [id number]  Referring provider: Mattie Hugo MD  Dx:   Encounter Diagnosis     ICD-10-CM    1  Spinal stenosis of lumbosacral region M48 07 Ambulatory referral to Physical Therapy   2  Lumbar radiculopathy M54 16 Ambulatory referral to Physical Therapy       Start Time: 89  Stop Time: 0940  Total time in clinic (min): 45 minutes    Assessment  Assessment details: Jeny Urena is a 68 y o  male who presents with pain, decreased strength, decreased ROM, decreased joint mobility, impaired sensation and postural  dysfunction  Due to these impairments, Patient has difficulty performing a/iadls, recreational activities, work-related activities and engaging in social activities  Patient's clinical presentation is consistent with their referring diagnosis of lumbar stenosis with left-sided radiculopathy  Patient would benefit from skilled physical therapy to address their aforementioned impairments, improve their level of function and to improve their overall quality of life  Impairments: abnormal or restricted ROM, activity intolerance, impaired physical strength, lacks appropriate home exercise program, pain with function and poor posture   Understanding of Dx/Px/POC: excellent  Goals  Short Term Goals: to be achieved by 4 weeks  1) Patient to be independent with basic HEP  2) Decrease pain to 5/10 at its worst   3) Increase lumbar spine ROM by 25% in all deficient planes  4) Increase LE strength by 1/2 MMT grade in all deficient planes  5) Patient to report decreased sleep interruption secondary to pain  6) Increase ambulatory tolerance by 10 min  Long Term Goals: to be achieved by discharge  1) FOTO equal to or greater than 51   2) Patient to be independent with comprehensive HEP  3) Abolish pain for improved quality of life  4) Lumbar spine ROM WNL all planes to improve a/iadls    5) Increase LE strength to 5/5 MMT grade in all planes to improve a/iadls  6) Patient to report no sleep interruption secondary to pain  7) Increase ambulatory tolerance to 60 min  Plan  Patient would benefit from: skilled PT  Planned modality interventions: biofeedback, cryotherapy, TENS, thermotherapy: hydrocollator packs, unattended electrical stimulation and low level laser therapy  Planned therapy interventions: abdominal trunk stabilization, activity modification, ADL retraining, ADL training, behavior modification, body mechanics training, breathing training, functional ROM exercises, home exercise program, IADL retraining, joint mobilization, manual therapy, massage, motor coordination training, neuromuscular re-education, patient education, postural training, self care, strengthening, stretching, therapeutic activities, therapeutic exercise and transfer training  Frequency: 2-3x week  Duration in weeks: 12  Treatment plan discussed with: patient        Subjective Evaluation    History of Present Illness  Mechanism of injury: Patient reports chronic h/o bilateral low back pain with left LE weakness/unsteadiness  Patient states that he has numbness t/o his left leg, greatest over his knee  Patient's sleep is interrupted  Patient has been avoiding exercising d/t pain with activity and it has resulted in weight gain  Patient denies experiencing bowel/bladder dysfunction  Patient has been prescribed muscle relaxants without relief  Patient has had several lumbar injections years ago with partial relief  Patient underwent a lumbar spine surgery in , but does not recall specific procedure  Patient has had physical therapy in the past with partial relief  No recent diagnostic imaging  Patient will be scheduling MRI of lumbar spine        Pain  Current pain ratin  At best pain ratin  At worst pain ratin  Location: bilateral low back  Quality: uncomfortable, "waves of pain"  Alleviating factors: bending forward, Advil, sitting  Exacerbated by: standing straight up, walking long distance, lifting (5-6 lbs)    Social Support    Employment status: working (Professor)  Patient Goals  Patient goal: return to exercising to lose weight, decrease pain, improve walking, increase core strength        Objective     Postural Observations  Seated posture: fair  Standing posture: fair        Palpation   Left   No palpable tenderness to the erector spinae and lumbar paraspinals  Right   No palpable tenderness to the erector spinae and lumbar paraspinals  Neurological Testing     Sensation     Lumbar   Left   Diminished: light touch    Right   Intact: light touch    Comments   Left light touch: t/o left LE    Reflexes   Left   Patellar (L4): absent (0)  Achilles (S1): absent (0)    Right   Patellar (L4): absent (0)  Achilles (S1): absent (0)    Active Range of Motion     Lumbar   Flexion: Active lumbar flexion: pain upon return to extension    WFL  Extension:  with pain Restriction level: minimal  Left lateral flexion:  WFL  Right lateral flexion:  WFL  Left rotation:  Restriction level: minimal  Right rotation:  Restriction level: minimal    Joint Play     Hypomobile: L1, L2, L3, L4, L5 and S1     Pain: L1, L2, L3, L4 and L5   Mechanical Assessment    Cervical      Thoracic      Lumbar    Standing flexion: repeated movements   Pain location:no change  Standing extension: repeated movements  Pain intensity: better  Left Sidegliding: repeated movements  Pain intensity: worse  Right sidegliding: repeated movements  Pain intensity:worse    Strength/Myotome Testing     Lumbar   Left   Heel walk: normal  Toe walk: normal    Right   Heel walk: normal  Toe walk: normal    Left Hip   Planes of Motion   Flexion: 4    Right Hip   Planes of Motion   Flexion: 5    Left Knee   Flexion: 5  Extension: 5    Right Knee   Flexion: 5  Extension: 5    Left Ankle/Foot   Dorsiflexion: 4    Right Ankle/Foot   Dorsiflexion: 5    Muscle Activation Additional Muscle Activation Details  Fair multifidus activation with compensatory diaphragm activation    Tests     Lumbar     Left   Positive passive SLR and slump test      Right   Negative passive SLR and slump test      Left Pelvic Girdle/Sacrum   Positive: active SLR test      Right Pelvic Girdle/Sacrum   Negative: active SLR test      Left Hip   Positive ERICA  Negative long sit  Right Hip   Negative long sit  Ambulation     Ambulation: Level Surfaces   Ambulation without assistive device: independent    Observational Gait   Gait: within functional limits     Comments   Bilateral squat: good technique, (+) LBP      Flowsheet Rows      Most Recent Value   PT/OT G-Codes   Current Score  36   Projected Score  51             Precautions: DM, cardiac, HTN      Manual  9/18            Lumbar gapping - s/l             Left sciatic nerve glides             Left piriformis str  Exercise Diary  9/18            Recumbent bike             VG             PPT             Piriformis str  DKC             Seated multidirectional PB roll out             Prayer str               Bridging             TA in h/l with marching                                                                                                                                                                Modalities  9/18

## 2019-09-24 ENCOUNTER — OFFICE VISIT (OUTPATIENT)
Dept: PHYSICAL THERAPY | Facility: REHABILITATION | Age: 69
End: 2019-09-24
Payer: COMMERCIAL

## 2019-09-24 DIAGNOSIS — M48.07 SPINAL STENOSIS OF LUMBOSACRAL REGION: Primary | ICD-10-CM

## 2019-09-24 DIAGNOSIS — M54.16 LUMBAR RADICULOPATHY: ICD-10-CM

## 2019-09-24 PROCEDURE — 97140 MANUAL THERAPY 1/> REGIONS: CPT | Performed by: PHYSICAL THERAPIST

## 2019-09-24 PROCEDURE — 97112 NEUROMUSCULAR REEDUCATION: CPT | Performed by: PHYSICAL THERAPIST

## 2019-09-24 PROCEDURE — 97110 THERAPEUTIC EXERCISES: CPT | Performed by: PHYSICAL THERAPIST

## 2019-09-24 NOTE — PROGRESS NOTES
Daily Note     Today's date: 2019  Patient name: Socrates Kruse  : 1950  MRN: [id number]  Referring provider: Nicol Tineo MD  Dx:   Encounter Diagnosis     ICD-10-CM    1  Spinal stenosis of lumbosacral region M48 07    2  Lumbar radiculopathy M54 16        Start Time: 0848  Stop Time: 0940  Total time in clinic (min): 52 minutes    Subjective: Patient reports that he feels a little better and has been performing his HEP every other day  Objective: See treatment diary below      Assessment: Tolerated treatment well  Patient demonstrated fatigue post treatment and would benefit from continued PT  Patient with difficulty activating core musculature without compensatory diaphragm activation  Patient with tendency to perform valsalva with PPT despite verbal, visual and tactile instruction  Plan: Continue per plan of care  Progress treatment as tolerated  Precautions: DM, cardiac, HTN      Manual             Lumbar gapping - s/l  GR           Left sciatic nerve glides  GR           Left piriformis str  GR                                         Exercise Diary             Recumbent bike  7'           VG  5' L7           PPT             Piriformis str  3x30"           DKC  10x10"           Seated multidirectional PB roll out  10x5" ea  Prayer str               Bridging  2x10 3"           TA in h/l with marching  20x3"                                                                                                                                                              Modalities

## 2019-09-27 ENCOUNTER — OFFICE VISIT (OUTPATIENT)
Dept: PHYSICAL THERAPY | Facility: REHABILITATION | Age: 69
End: 2019-09-27
Payer: COMMERCIAL

## 2019-09-27 DIAGNOSIS — M54.16 LUMBAR RADICULOPATHY: ICD-10-CM

## 2019-09-27 DIAGNOSIS — M48.07 SPINAL STENOSIS OF LUMBOSACRAL REGION: Primary | ICD-10-CM

## 2019-09-27 PROCEDURE — 97110 THERAPEUTIC EXERCISES: CPT | Performed by: PHYSICAL THERAPIST

## 2019-09-27 PROCEDURE — 97140 MANUAL THERAPY 1/> REGIONS: CPT | Performed by: PHYSICAL THERAPIST

## 2019-09-27 PROCEDURE — 97112 NEUROMUSCULAR REEDUCATION: CPT | Performed by: PHYSICAL THERAPIST

## 2019-09-27 NOTE — PROGRESS NOTES
Daily Note     Today's date: 2019  Patient name: Rebekah Marsh  : 1950  MRN: [id number]  Referring provider: Vale Kaur MD  Dx:   Encounter Diagnosis     ICD-10-CM    1  Spinal stenosis of lumbosacral region M48 07    2  Lumbar radiculopathy M54 16        Start Time: 820  Stop Time: 902  Total time in clinic (min): 42 minutes    Subjective: Patient reports that he had increased pain in his low back following his previous treatment session and some numbness into his left knee  Patient's pain interrupted his sleep  Patient states that his pain is close to baseline at this time  Objective: See treatment diary below      Assessment: Tolerated treatment well  Patient demonstrated fatigue post treatment, exhibited good technique with therapeutic exercises and would benefit from continued PT  Patient continues to present with significantly limited lumbosacral mobility with inability to perform PPT  Plan: Continue per plan of care  Progress treatment as tolerated  Precautions: DM, cardiac, HTN      Manual            Lumbar gapping - s/l  GR           Left sciatic nerve glides  GR           Left piriformis str  GR           Lumbar flexion MWM   GR          Pelvic, sacral rocking   GR              Exercise Diary            Recumbent bike  7' 7'          VG  5' L7 5' L7          PPT             Piriformis str  3x30"           DKC  10x10"           Seated multidirectional PB roll out  10x5" ea  10x5" ea  Prayer str               Bridging  2x10 3"           TA in h/l with marching  20x3"           Quadruped rocking   20x3"          Quadruped PPT   15x                                                                                                                                   Modalities

## 2019-10-01 ENCOUNTER — APPOINTMENT (OUTPATIENT)
Dept: PHYSICAL THERAPY | Facility: REHABILITATION | Age: 69
End: 2019-10-01
Payer: COMMERCIAL

## 2019-10-03 ENCOUNTER — TELEPHONE (OUTPATIENT)
Dept: FAMILY MEDICINE CLINIC | Facility: CLINIC | Age: 69
End: 2019-10-03

## 2019-10-03 LAB
ALBUMIN SERPL-MCNC: 4.4 G/DL (ref 3.6–5.1)
ALBUMIN/GLOB SERPL: 1.9 (CALC) (ref 1–2.5)
ALP SERPL-CCNC: 68 U/L (ref 40–115)
ALT SERPL-CCNC: 25 U/L (ref 9–46)
AST SERPL-CCNC: 19 U/L (ref 10–35)
BASOPHILS # BLD AUTO: 28 CELLS/UL (ref 0–200)
BASOPHILS NFR BLD AUTO: 0.7 %
BILIRUB SERPL-MCNC: 0.9 MG/DL (ref 0.2–1.2)
BUN SERPL-MCNC: 10 MG/DL (ref 7–25)
BUN/CREAT SERPL: ABNORMAL (CALC) (ref 6–22)
CALCIUM SERPL-MCNC: 9.3 MG/DL (ref 8.6–10.3)
CHLORIDE SERPL-SCNC: 106 MMOL/L (ref 98–110)
CHOLEST SERPL-MCNC: 116 MG/DL
CHOLEST/HDLC SERPL: 2.8 (CALC)
CO2 SERPL-SCNC: 31 MMOL/L (ref 20–32)
CREAT SERPL-MCNC: 0.92 MG/DL (ref 0.7–1.18)
EOSINOPHIL # BLD AUTO: 28 CELLS/UL (ref 15–500)
EOSINOPHIL NFR BLD AUTO: 0.7 %
ERYTHROCYTE [DISTWIDTH] IN BLOOD BY AUTOMATED COUNT: 13.6 % (ref 11–15)
GLOBULIN SER CALC-MCNC: 2.3 G/DL (CALC) (ref 1.9–3.7)
GLUCOSE SERPL-MCNC: 110 MG/DL (ref 65–99)
HBA1C MFR BLD: 5.8 % OF TOTAL HGB
HCT VFR BLD AUTO: 35.1 % (ref 38.5–50)
HDLC SERPL-MCNC: 42 MG/DL
HGB BLD-MCNC: 12 G/DL (ref 13.2–17.1)
LDLC SERPL CALC-MCNC: 58 MG/DL (CALC)
LYMPHOCYTES # BLD AUTO: 1604 CELLS/UL (ref 850–3900)
LYMPHOCYTES NFR BLD AUTO: 40.1 %
MCH RBC QN AUTO: 32.1 PG (ref 27–33)
MCHC RBC AUTO-ENTMCNC: 34.2 G/DL (ref 32–36)
MCV RBC AUTO: 93.9 FL (ref 80–100)
MONOCYTES # BLD AUTO: 416 CELLS/UL (ref 200–950)
MONOCYTES NFR BLD AUTO: 10.4 %
NEUTROPHILS # BLD AUTO: 1924 CELLS/UL (ref 1500–7800)
NEUTROPHILS NFR BLD AUTO: 48.1 %
NONHDLC SERPL-MCNC: 74 MG/DL (CALC)
PLATELET # BLD AUTO: 141 THOUSAND/UL (ref 140–400)
PMV BLD REES-ECKER: 11.3 FL (ref 7.5–12.5)
POTASSIUM SERPL-SCNC: 4.4 MMOL/L (ref 3.5–5.3)
PROT SERPL-MCNC: 6.7 G/DL (ref 6.1–8.1)
PSA SERPL-MCNC: 1.4 NG/ML
RBC # BLD AUTO: 3.74 MILLION/UL (ref 4.2–5.8)
SL AMB EGFR AFRICAN AMERICAN: 97 ML/MIN/1.73M2
SL AMB EGFR NON AFRICAN AMERICAN: 83 ML/MIN/1.73M2
SODIUM SERPL-SCNC: 142 MMOL/L (ref 135–146)
TRIGL SERPL-MCNC: 80 MG/DL
TSH SERPL-ACNC: 1.98 MIU/L (ref 0.4–4.5)
WBC # BLD AUTO: 4 THOUSAND/UL (ref 3.8–10.8)

## 2019-10-03 NOTE — TELEPHONE ENCOUNTER
Please contact patient  All his blood work was normal aside from very slight decrease of hemoglobin at 12 0  His likely related to his sickle cell trait  Hemoglobin a year ago was actually lower at 11 5  Please advise him to continue same daily medications  Please let him know the test for diabetes, hemoglobin A1c, has improved significantly and is currently in  nondiabetic range at 5 7  which is great news!       Thank you

## 2019-10-04 ENCOUNTER — OFFICE VISIT (OUTPATIENT)
Dept: PHYSICAL THERAPY | Facility: REHABILITATION | Age: 69
End: 2019-10-04
Payer: COMMERCIAL

## 2019-10-04 DIAGNOSIS — M54.16 LUMBAR RADICULOPATHY: ICD-10-CM

## 2019-10-04 DIAGNOSIS — M48.07 SPINAL STENOSIS OF LUMBOSACRAL REGION: Primary | ICD-10-CM

## 2019-10-04 PROCEDURE — 97140 MANUAL THERAPY 1/> REGIONS: CPT | Performed by: PHYSICAL THERAPIST

## 2019-10-04 PROCEDURE — 97110 THERAPEUTIC EXERCISES: CPT | Performed by: PHYSICAL THERAPIST

## 2019-10-04 NOTE — PROGRESS NOTES
Daily Note     Today's date: 10/4/2019  Patient name: Kalpana Pimentel  : 1950  MRN: [id number]  Referring provider: Leonie Israel MD  Dx:   Encounter Diagnosis     ICD-10-CM    1  Spinal stenosis of lumbosacral region M48 07    2  Lumbar radiculopathy M54 16        Start Time: 0815  Stop Time: 0900  Total time in clinic (min): 45 minutes    Subjective: Patient reports that his back pain is gradually feeling better  Objective: See treatment diary below      Assessment: Tolerated treatment well  Patient demonstrated fatigue post treatment, exhibited good technique with therapeutic exercises and would benefit from continued PT  Patient with slightly improved ability to perform quadruped PPT, however, remains significantly limited with lumbosacral mobility  Plan: Continue per plan of care  Progress treatment as tolerated  Precautions: DM, cardiac, HTN      Manual   10         Lumbar gapping - s/l  GR           Left sciatic nerve glides  GR           Left piriformis str  GR           Lumbar flexion MWM   GR GR         Pelvic, sacral rocking   GR GR             Exercise Diary   10/4         Recumbent bike  7' 7' 10'         VG  5' L7 5' L7 5' L7         PPT             Piriformis str  3x30"           DKC  10x10"           Seated multidirectional PB roll out  10x5" ea  10x5" ea  Prayer str               Bridging  2x10 3"           TA in h/l with marching  20x3"           Quadruped rocking   20x3" 20x5"         Quadruped PPT   15x          Gym program review    20'                                                                                                                     Modalities   10/4

## 2019-10-06 NOTE — PROGRESS NOTES
Cardiology Follow Up    Marlon Lechuga  1950  [id number]  [facility] [id number] [id number] [phone]    1  Benign essential hypertension     2  Pure hypercholesterolemia     3  Complete left bundle branch block (LBBB)         Interval History:  Patient is here for a follow-up visit  He was most recently seen by me in December 2018  Patient has a history of essential hypertension, hyperlipidemia and LBBB  He had a Rhea Piggs done June 23, 2017 associated with prolonged chest discomfort and without EKG changes but the baseline is a left bundle branch block  Cardiac catheterization was recommended but then the patient went on to have a nuclear exercise test which showed no scintigraphic evidence of ischemia  His most recent echocardiogram done October 2018 demonstrated preserved LV systolic function and no significant valvular heart disease  He did see a cardiologist in the [facility] system April 23, 2019, Dr Brendon Long  His blood pressure was found to be well controlled  Aspirin and statin were continued  Patient has been well  He has had no chest pain or significant dyspnea  He has rare palpitation  His vital signs are stable today  Cholesterol profile done October 2, 2019 looked good      Patient Active Problem List   Diagnosis    Anemia    Arthralgia of knee, left    Benign enlargement of prostate    Benign essential hypertension    Complete left bundle branch block (LBBB)    Radiculopathy affecting upper extremity    Sickle cell trait ([address] )    Spinal stenosis    Mild obstructive sleep apnea    Type 2 diabetes, diet controlled ([address] )    Hyperlipidemia     Past Medical History:   Diagnosis Date    Anemia     mild anemia    Back pain     Heart burn     History of chest pain     Hypertension     Palpitations     Problems with hearing     Problems with swallowing     Sickle cell trait [facility] [address] [facility]     last assessed 10/31/14    Snoring     SOB (shortness of breath)     Spinal stenosis     ddd spinal stenosis    Swelling      Social History     Socioeconomic History    Marital status: /Civil Union     Spouse name: Not on file    Number of children: Not on file    Years of education: Not on file    Highest education level: Not on file   Occupational History    Not on file   Social Needs    Financial resource strain: Not on file    Food insecurity:     Worry: Not on file     Inability: Not on file    Transportation needs:     Medical: Not on file     Non-medical: Not on file   Tobacco Use    Smoking status: Never Smoker    Smokeless tobacco: Never Used   Substance and Sexual Activity    Alcohol use: No    Drug use: No    Sexual activity: Not on file   Lifestyle    Physical activity:     Days per week: Not on file     Minutes per session: Not on file    Stress: Not on file   Relationships    Social connections:     Talks on phone: Not on file     Gets together: Not on file     Attends Jain service: Not on file     Active member of club or organization: Not on file     Attends meetings of clubs or organizations: Not on file     Relationship status: Not on file    Intimate partner violence:     Fear of current or ex partner: Not on file     Emotionally abused: Not on file     Physically abused: Not on file     Forced sexual activity: Not on file   Other Topics Concern    Not on file   Social History Narrative    Not on file      Family History   Problem Relation Age of Onset    Diabetes Father     Sickle cell anemia Family     Diabetes Sister     Cancer Brother      Past Surgical History:   Procedure Laterality Date    BACK SURGERY      lower back    COLONOSCOPY  01/2016    due 2026  12/07       Current Outpatient Medications:     amLODIPine (NORVASC) 10 mg tablet, Take 1 tablet by mouth daily, Disp: , Rfl:   aspirin 81 mg chewable tablet, Chew 1 tablet daily, Disp: , Rfl:     Blood Glucose Monitoring Suppl (ONE TOUCH ULTRA 2) w/Device KIT, Use as directed, Disp: , Rfl: 0    hydrochlorothiazide (HYDRODIURIL) 25 mg tablet, Take 25 mg by mouth daily, Disp: , Rfl: 3    methocarbamol (ROBAXIN) 750 mg tablet, Take 1 tablet at bedtime as needed for painful back spasm, Disp: 30 tablet, Rfl: 1    ONE TOUCH ULTRA TEST test strip, daily, Disp: , Rfl: 3    ONETOUCH DELICA LANCETS 04H MISC, daily, Disp: , Rfl: 3    rosuvastatin (CRESTOR) 20 MG tablet, Take 1 tablet by mouth daily, Disp: , Rfl:     valsartan (DIOVAN) 320 MG tablet, Take 1 tablet (320 mg total) by mouth daily, Disp: 90 tablet, Rfl: 1  Allergies   Allergen Reactions    Amlodipine Rash    Levofloxacin Rash     Other reaction(s): Unknown Allergic Reaction       Labs:not applicable  Imaging: No results found  Review of Systems:  Review of Systems   All other systems reviewed and are negative  Physical Exam:  /66 (BP Location: Right arm, Patient Position: Sitting, Cuff Size: Standard)   Pulse 60   Ht 5' 6" (1 676 m)   Wt 89 8 kg (198 lb)   BMI 31 96 kg/m²   Physical Exam   Constitutional: He is oriented to person, place, and time  He appears well-developed and well-nourished  HENT:   Head: Normocephalic and atraumatic  Eyes: Pupils are equal, round, and reactive to light  Conjunctivae are normal    Neck: Normal range of motion  Neck supple  Cardiovascular: Normal rate and normal heart sounds  Pulmonary/Chest: Effort normal and breath sounds normal    Neurological: He is alert and oriented to person, place, and time  Skin: Skin is warm and dry  Psychiatric: He has a normal mood and affect  Vitals reviewed  Discussion/Summary:I will continue the patient's present medical regimen  Patient appears well compensated    I have asked the patient to call if there is a problem in the interim otherwise I will see the patient in one years time

## 2019-10-08 ENCOUNTER — OFFICE VISIT (OUTPATIENT)
Dept: PHYSICAL THERAPY | Facility: REHABILITATION | Age: 69
End: 2019-10-08
Payer: COMMERCIAL

## 2019-10-08 DIAGNOSIS — M54.16 LUMBAR RADICULOPATHY: ICD-10-CM

## 2019-10-08 DIAGNOSIS — M48.07 SPINAL STENOSIS OF LUMBOSACRAL REGION: Primary | ICD-10-CM

## 2019-10-08 PROCEDURE — 97140 MANUAL THERAPY 1/> REGIONS: CPT | Performed by: PHYSICAL THERAPIST

## 2019-10-08 PROCEDURE — 97110 THERAPEUTIC EXERCISES: CPT | Performed by: PHYSICAL THERAPIST

## 2019-10-08 PROCEDURE — 97112 NEUROMUSCULAR REEDUCATION: CPT | Performed by: PHYSICAL THERAPIST

## 2019-10-08 NOTE — PROGRESS NOTES
Daily Note     Today's date: 10/8/2019  Patient name: Roseanne Walker  : 1950  MRN: [id number]  Referring provider: Radha Willett MD  Dx:   Encounter Diagnosis     ICD-10-CM    1  Spinal stenosis of lumbosacral region M48 07    2  Lumbar radiculopathy M54 16        Start Time: 0845  Stop Time: 0940  Total time in clinic (min): 55 minutes    Subjective: Patient reports that his back is improving and he is having less pain  Objective: See treatment diary below      Assessment: Tolerated treatment well  Patient demonstrated fatigue post treatment and would benefit from continued PT  Patient with improved lumbopelvic mobility but remains limited  Patient with improved posture, but remains painful during gait  Plan: Continue per plan of care  Progress treatment as tolerated  Precautions: DM, cardiac, HTN      Manual  9/18 9/24 9/27 10/4 10/8        Lumbar gapping - s/l  GR           Left sciatic nerve glides  GR           Left piriformis str  GR           Lumbar flexion MWM   GR GR GR        Pelvic, sacral rocking   GR GR GR            Exercise Diary  9/18 9/24 9/27 10/4 10/8        Recumbent bike  7' 7' 10'         VG  5' L7 5' L7 5' L7 5' L7        PPT             Piriformis str  3x30"           DKC  10x10"           Seated multidirectional PB roll out  10x5" ea  10x5" ea  Prayer str               Bridging  2x10 3"           TA in h/l with marching  20x3"   15x3"        Quadruped rocking   20x3" 20x5" 20x5"        Quadruped PPT   15x  15x3"        Gym program review    20'         Treadmill     10'                                                                                                       Modalities  9/18 9/24 9/27 10/4 10/8

## 2019-10-10 ENCOUNTER — TELEPHONE (OUTPATIENT)
Dept: FAMILY MEDICINE CLINIC | Facility: CLINIC | Age: 69
End: 2019-10-10

## 2019-10-10 NOTE — TELEPHONE ENCOUNTER
Pt called stating he has an appt with Cardiology tomorrow at 8 am, he was told he needs a referral from Dr Lenora Gardner to be seen in the office tomorrow  Please call to advise

## 2019-10-11 ENCOUNTER — APPOINTMENT (OUTPATIENT)
Dept: PHYSICAL THERAPY | Facility: REHABILITATION | Age: 69
End: 2019-10-11
Payer: COMMERCIAL

## 2019-10-11 ENCOUNTER — OFFICE VISIT (OUTPATIENT)
Dept: CARDIOLOGY CLINIC | Facility: CLINIC | Age: 69
End: 2019-10-11
Payer: COMMERCIAL

## 2019-10-11 VITALS
HEIGHT: 66 IN | WEIGHT: 198 LBS | HEART RATE: 60 BPM | SYSTOLIC BLOOD PRESSURE: 120 MMHG | BODY MASS INDEX: 31.82 KG/M2 | DIASTOLIC BLOOD PRESSURE: 66 MMHG

## 2019-10-11 DIAGNOSIS — I44.7 COMPLETE LEFT BUNDLE BRANCH BLOCK (LBBB): ICD-10-CM

## 2019-10-11 DIAGNOSIS — I10 BENIGN ESSENTIAL HYPERTENSION: Primary | ICD-10-CM

## 2019-10-11 DIAGNOSIS — E78.00 PURE HYPERCHOLESTEROLEMIA: ICD-10-CM

## 2019-10-11 PROCEDURE — 99214 OFFICE O/P EST MOD 30 MIN: CPT | Performed by: INTERNAL MEDICINE

## 2019-10-15 ENCOUNTER — OFFICE VISIT (OUTPATIENT)
Dept: PHYSICAL THERAPY | Facility: REHABILITATION | Age: 69
End: 2019-10-15
Payer: COMMERCIAL

## 2019-10-15 DIAGNOSIS — M54.16 LUMBAR RADICULOPATHY: ICD-10-CM

## 2019-10-15 DIAGNOSIS — M48.07 SPINAL STENOSIS OF LUMBOSACRAL REGION: Primary | ICD-10-CM

## 2019-10-15 PROCEDURE — 97140 MANUAL THERAPY 1/> REGIONS: CPT | Performed by: PHYSICAL THERAPIST

## 2019-10-15 PROCEDURE — 97112 NEUROMUSCULAR REEDUCATION: CPT | Performed by: PHYSICAL THERAPIST

## 2019-10-15 PROCEDURE — 97110 THERAPEUTIC EXERCISES: CPT | Performed by: PHYSICAL THERAPIST

## 2019-10-15 NOTE — PROGRESS NOTES
Daily Note     Today's date: 10/16/2019  Patient name: Karena Shanks  : 1950  MRN: [id number]  Referring provider: Kathy Medeiros MD  Dx:   Encounter Diagnosis     ICD-10-CM    1  Spinal stenosis of lumbosacral region M48 07    2  Lumbar radiculopathy M54 16        Start Time: 0840  Stop Time: 0930  Total time in clinic (min): 50 minutes    Subjective: Patient reports that his pain is slowly improving, but he is still having trouble standing up straight  Objective: See treatment diary below      Assessment: Tolerated treatment well  Patient demonstrated fatigue post treatment, exhibited good technique with therapeutic exercises and would benefit from continued PT  No significant changes to overall status this visit  Plan: Continue per plan of care  Progress treatment as tolerated  Precautions: DM, cardiac, HTN      Manual  9/18 9/24 9/27 10/4 10/8 10/15       Lumbar gapping - s/l  GR           Left sciatic nerve glides  GR           Left piriformis str  GR           Lumbar flexion MWM   GR GR GR GR       Pelvic, sacral rocking   GR GR GR GR           Exercise Diary  9/18 9/24 9/27 10/4 10/8 10/15       Recumbent bike  7' 7' 10'         VG  5' L7 5' L7 5' L7 5' L7 5' 50%       PPT             Piriformis str  3x30"           DKC  10x10"           Seated multidirectional PB roll out  10x5" ea  10x5" ea  Prayer str               Bridging  2x10 3"           TA in h/l with marching  20x3"   15x3"        Quadruped rocking   20x3" 20x5" 20x5"        Quadruped PPT   15x  15x3"        Gym program review    21'         Treadmill     10' 10'       Standing multifidus chops      20x5" GTB       Seated multifidus push-out on PB      20x5" GTB                                                                            Modalities  9/18 9/24 9/27 10/4 10/8 10/15

## 2019-10-18 ENCOUNTER — OFFICE VISIT (OUTPATIENT)
Dept: PHYSICAL THERAPY | Facility: REHABILITATION | Age: 69
End: 2019-10-18
Payer: COMMERCIAL

## 2019-10-18 DIAGNOSIS — M54.16 LUMBAR RADICULOPATHY: ICD-10-CM

## 2019-10-18 DIAGNOSIS — M48.07 SPINAL STENOSIS OF LUMBOSACRAL REGION: Primary | ICD-10-CM

## 2019-10-18 PROCEDURE — 97112 NEUROMUSCULAR REEDUCATION: CPT | Performed by: PHYSICAL THERAPIST

## 2019-10-18 PROCEDURE — 97110 THERAPEUTIC EXERCISES: CPT | Performed by: PHYSICAL THERAPIST

## 2019-10-18 PROCEDURE — 97140 MANUAL THERAPY 1/> REGIONS: CPT | Performed by: PHYSICAL THERAPIST

## 2019-10-18 NOTE — PROGRESS NOTES
Daily Note     Today's date: 10/18/2019  Patient name: Wilda Del Real  : 1950  MRN: [id number]  Referring provider: Adenike Barrios MD  Dx:   Encounter Diagnosis     ICD-10-CM    1  Spinal stenosis of lumbosacral region M48 07    2  Lumbar radiculopathy M54 16        Start Time: 0800  Stop Time: 0900  Total time in clinic (min): 60 minutes    Subjective: Patient reports that he needs to lose weight in his stomach and that he thinks that will help with his posture and back pain  Objective: See treatment diary below      Assessment: Tolerated treatment well  Patient demonstrated fatigue post treatment, exhibited good technique with therapeutic exercises and would benefit from continued PT  Patient reporting minimal change at end of session  Plan: Continue per plan of care  Progress treatment as tolerated  Precautions: DM, cardiac, HTN      Manual  9/18 9/24 9/27 10/4 10/8 10/15 10/18      Lumbar gapping - s/l  GR           Left sciatic nerve glides  GR           Left piriformis str  GR           Lumbar flexion MWM   GR GR GR GR GR      Pelvic, sacral rocking   GR GR GR GR GR          Exercise Diary  9/18 9/24 9/27 10/4 10/8 10/15 10/18      Recumbent bike  7' 7' 10'         VG  5' L7 5' L7 5' L7 5' L7 5' 50% 5' L7      PPT             Piriformis str  3x30"           DKC  10x10"           Seated multidirectional PB roll out  10x5" ea  10x5" ea  Prayer str               Bridging  2x10 3"           TA in h/l with marching  20x3"   15x3"        Quadruped rocking   20x3" 20x5" 20x5"        Quadruped PPT   15x  15x3"        Gym program review    21'         Treadmill     10' 10' 10'      Standing multifidus chops      20x5" GTB Seated PB 2x10 5" GTB b/l      Seated multifidus push-out on PB      20x5" GTB       Multifidus walk-out       15x3" GTB b/l      Seated PNF multifidus lifts       20x5" GTB                                                 Modalities  9/18 9/24 9/27 10/4 10/8 10/15 10/18

## 2019-10-22 ENCOUNTER — APPOINTMENT (OUTPATIENT)
Dept: PHYSICAL THERAPY | Facility: REHABILITATION | Age: 69
End: 2019-10-22
Payer: COMMERCIAL

## 2019-10-22 ENCOUNTER — OFFICE VISIT (OUTPATIENT)
Dept: PHYSICAL THERAPY | Facility: REHABILITATION | Age: 69
End: 2019-10-22
Payer: COMMERCIAL

## 2019-10-22 DIAGNOSIS — M48.07 SPINAL STENOSIS OF LUMBOSACRAL REGION: Primary | ICD-10-CM

## 2019-10-22 DIAGNOSIS — M54.16 LUMBAR RADICULOPATHY: ICD-10-CM

## 2019-10-22 PROCEDURE — 97140 MANUAL THERAPY 1/> REGIONS: CPT | Performed by: PHYSICAL THERAPIST

## 2019-10-22 PROCEDURE — 97112 NEUROMUSCULAR REEDUCATION: CPT | Performed by: PHYSICAL THERAPIST

## 2019-10-22 PROCEDURE — 97110 THERAPEUTIC EXERCISES: CPT | Performed by: PHYSICAL THERAPIST

## 2019-10-22 NOTE — PROGRESS NOTES
Daily Note     Today's date: 10/22/2019  Patient name: Trina Pringle  : 1950  MRN: [id number]  Referring provider: Demar Levin MD  Dx:   No diagnosis found  Start Time: 915  Stop Time: 1003  Total time in clinic (min): 48 minutes    Subjective: Pt offers no complaints  Objective: See treatment diary below      Assessment: Pt responded well to manual techniques and addition of kneeling hip flexor stretch  He fatigued with core stabilization exercises  No pain reported at the end of tx  Pt will benefit from continued skilled outpatient PT to improve strength, to address therapy goals, to reduce pain, and improve function  Plan: Continue per plan of care  Progress treatment as tolerated  Precautions: DM, cardiac, HTN      Manual  9/18 9/24 9/27 10/4 10/8 10/15 10/18 10/22     Lumbar gapping - s/l  GR           Left sciatic nerve glides  GR           Left piriformis str  GR           Lumbar flexion MWM   GR GR GR GR GR DS     Pelvic, sacral rocking   GR GR GR GR GR DS         Exercise Diary  9/18 9/24 9/27 10/4 10/8 10/15 10/18 10/22     Recumbent bike  7' 7' 10'         VG  5' L7 5' L7 5' L7 5' L7 5' 50% 5' L7 L7 5'     PPT             Piriformis str  3x30"           DKC  10x10"           Seated multidirectional PB roll out  10x5" ea  10x5" ea  Prayer str               Bridging  2x10 3"           TA in h/l with marching  20x3"   15x3"        Quadruped rocking   20x3" 20x5" 20x5"        Quadruped PPT   15x  15x3"        Gym program review    21'         Treadmill     10' 10' 10' 10'     Standing multifidus chops      20x5" GTB Seated PB 2x10 5" GTB b/l Seated PB 2x10 5" GTB b/l     Seated multifidus push-out on PB      20x5" GTB       Multifidus walk-out       15x3" GTB b/l 15x3" GTB b/l     Seated PNF multifidus lifts       20x5" GTB 20x5" GTB     Kneeling hip flexor stretch        30"x3 each                                   Modalities  9/18 9/24 9/27 10/4 10/8 10/15 10/18

## 2019-10-23 ENCOUNTER — TELEPHONE (OUTPATIENT)
Dept: OTHER | Facility: OTHER | Age: 69
End: 2019-10-23

## 2019-10-24 NOTE — TELEPHONE ENCOUNTER
Needs a referral for an eye appointment today The office # is [phone]  The patient was advised to also call the office to ensure the referral is sent since the appointment is for the same day

## 2019-10-25 LAB
LEFT EYE DIABETIC RETINOPATHY: NORMAL
RIGHT EYE DIABETIC RETINOPATHY: NORMAL

## 2019-11-08 DIAGNOSIS — E11.9 TYPE 2 DIABETES, DIET CONTROLLED (HCC): Primary | ICD-10-CM

## 2019-11-21 DIAGNOSIS — E78.00 HIGH CHOLESTEROL: ICD-10-CM

## 2019-11-21 DIAGNOSIS — I10 BENIGN ESSENTIAL HYPERTENSION: Primary | ICD-10-CM

## 2019-11-26 ENCOUNTER — TELEPHONE (OUTPATIENT)
Dept: FAMILY MEDICINE CLINIC | Facility: CLINIC | Age: 69
End: 2019-11-26

## 2019-11-26 DIAGNOSIS — E78.00 HIGH CHOLESTEROL: ICD-10-CM

## 2019-11-26 DIAGNOSIS — I10 BENIGN ESSENTIAL HYPERTENSION: ICD-10-CM

## 2019-11-26 RX ORDER — HYDROCHLOROTHIAZIDE 25 MG/1
25 TABLET ORAL DAILY
Qty: 90 TABLET | Refills: 3 | Status: SHIPPED | OUTPATIENT
Start: 2019-11-26 | End: 2020-09-16

## 2019-11-26 RX ORDER — HYDROCHLOROTHIAZIDE 25 MG/1
25 TABLET ORAL DAILY
Qty: 90 TABLET | Refills: 3 | OUTPATIENT
Start: 2019-11-26

## 2019-11-26 RX ORDER — AMLODIPINE BESYLATE 10 MG/1
10 TABLET ORAL DAILY
Qty: 90 TABLET | Refills: 3 | OUTPATIENT
Start: 2019-11-26

## 2019-11-26 RX ORDER — ROSUVASTATIN CALCIUM 20 MG/1
20 TABLET, COATED ORAL DAILY
Qty: 90 TABLET | Refills: 3 | Status: SHIPPED | OUTPATIENT
Start: 2019-11-26 | End: 2020-08-26 | Stop reason: SDUPTHER

## 2019-11-26 RX ORDER — AMLODIPINE BESYLATE 10 MG/1
10 TABLET ORAL DAILY
Qty: 30 TABLET | Refills: 3 | OUTPATIENT
Start: 2019-11-26

## 2019-11-26 RX ORDER — VALSARTAN 320 MG/1
320 TABLET ORAL DAILY
Qty: 90 TABLET | Refills: 3 | OUTPATIENT
Start: 2019-11-26

## 2019-11-26 RX ORDER — LOSARTAN POTASSIUM 100 MG/1
100 TABLET ORAL DAILY
COMMUNITY
End: 2020-02-03

## 2019-11-26 RX ORDER — ROSUVASTATIN CALCIUM 20 MG/1
20 TABLET, COATED ORAL DAILY
Qty: 90 TABLET | Refills: 3 | OUTPATIENT
Start: 2019-11-26

## 2019-11-26 NOTE — TELEPHONE ENCOUNTER
Recent cardiology letter reviewed, October 29th  Patient follows with [facility]  Patient remains on regimen of aspirin, amlodipine, Crestor, losartan 100 mg and p r n   HCTZ

## 2019-12-02 ENCOUNTER — OFFICE VISIT (OUTPATIENT)
Dept: PHYSICAL THERAPY | Facility: REHABILITATION | Age: 69
End: 2019-12-02
Payer: COMMERCIAL

## 2019-12-02 DIAGNOSIS — M54.16 LUMBAR RADICULOPATHY: Primary | ICD-10-CM

## 2019-12-02 PROCEDURE — 97110 THERAPEUTIC EXERCISES: CPT | Performed by: PHYSICAL THERAPIST

## 2019-12-02 PROCEDURE — 97140 MANUAL THERAPY 1/> REGIONS: CPT | Performed by: PHYSICAL THERAPIST

## 2019-12-02 PROCEDURE — 97112 NEUROMUSCULAR REEDUCATION: CPT | Performed by: PHYSICAL THERAPIST

## 2019-12-02 NOTE — PROGRESS NOTES
PT Re-Evaluation     Today's date: 2019  Patient name: Ever Allison  : 1950  MRN: [id number]  Referring provider: Navneet Andrew MD  Dx:   Encounter Diagnosis     ICD-10-CM    1  Lumbar radiculopathy M54 16        Start Time:   Stop Time: 1000  Total time in clinic (min): 47 minutes    Assessment  Assessment details: Ever Allison presents for his 9th visit and reassessment for low back pain with stenosis  At this time he reports feeling 50% improvement of sx's overall  Numbness and tingling only radiates to the level of the L knee now, previously to the foot, sleep has improved, and max pain level has decreased from a 9/10 to a 6/10  However pt continues to present with limited and painful lumbar AROM, LLE n/t to the knee, decreased LE strength, and limited tolerance to standing and walking (limited to 15 min)  Pt will benefit from continued skilled outpatient PT to improve strength, to address therapy goals, to reduce pain, and improve function  Impairments: abnormal or restricted ROM, activity intolerance, impaired physical strength, lacks appropriate home exercise program, pain with function and poor posture   Understanding of Dx/Px/POC: excellent   Prognosis: fair    Goals  Short Term Goals: to be achieved by 4 weeks  1) Patient to be independent with basic HEP  MET  2) Decrease pain to 5/10 at its worst  UNMET  3) Increase lumbar spine ROM by 25% in all deficient planes  MET  4) Increase LE strength by 1/2 MMT grade in all deficient planes  MET  5) Patient to report decreased sleep interruption secondary to pain  MET  6) Increase ambulatory tolerance by 10 min  MET - walking >10 min    Long Term Goals: to be achieved by discharge  1) FOTO equal to or greater than 51  MET  2) Patient to be independent with comprehensive HEP  3) Abolish pain for improved quality of life  PROGRESSING  4) Lumbar spine ROM WNL all planes to improve a/iadls   PROGRESSING  5) Increase LE strength to 5/5 MMT grade in all planes to improve a/iadls  PROGRESSING  6) Patient to report no sleep interruption secondary to pain  PROGRESSING  7) Increase ambulatory tolerance to 60 min  - UNMET, 15 min max    Plan  Patient would benefit from: skilled PT  Planned modality interventions: biofeedback, cryotherapy, TENS, thermotherapy: hydrocollator packs, unattended electrical stimulation and low level laser therapy  Planned therapy interventions: abdominal trunk stabilization, activity modification, ADL retraining, ADL training, behavior modification, body mechanics training, breathing training, functional ROM exercises, home exercise program, IADL retraining, joint mobilization, manual therapy, massage, motor coordination training, neuromuscular re-education, patient education, postural training, self care, strengthening, stretching, therapeutic activities, therapeutic exercise and transfer training  Frequency: 2-3x week  Duration in weeks: 6  Treatment plan discussed with: patient        Subjective Evaluation    History of Present Illness  Mechanism of injury: Pt reports feeling 50% improvement overall  Morning pain is better, but worse in the evening  Taking Advil for pain still sometimes  States he is sleeping better, but still gets numbness and tingling down the L leg to the level of the knee    Pain  Current pain ratin  At best pain ratin  At worst pain ratin          Objective     Active Range of Motion     Lumbar   Flexion:  WFL  Extension:  with pain Restriction level: minimal  Left lateral flexion:  WFL  Right lateral flexion:  WFL  Left rotation:  Restriction level: minimal  Right rotation:  Restriction level: minimal    Strength/Myotome Testing     Left Hip   Planes of Motion   Flexion: 4+  Extension: 4+  Abduction: 4    Right Hip   Planes of Motion   Flexion: 5  Extension: 4+  Abduction: 4    Left Knee   Flexion: 4+  Extension: 5    Right Knee   Flexion: 5  Extension: 5    Left Ankle/Foot   Dorsiflexion: 4+    Right Ankle/Foot   Dorsiflexion: 4+             Precautions: DM, cardiac, HTN        Manual  9/18 9/24 9/27 10/4 10/8 10/15 10/18 10/22  12/2     Lumbar gapping - s/l   GR                   Left sciatic nerve glides   GR                   Left piriformis str    GR             Reassessment     Lumbar flexion MWM     GR GR GR GR GR DS       Pelvic, sacral rocking     GR GR GR GR GR DS  DS     Hip flexor stretch         DS          Exercise Diary  9/18 9/24 9/27 10/4 10/8 10/15 10/18 10/22  12/2     Recumbent bike   7' 7' 10'               VG   5' L7 5' L7 5' L7 5' L7 5' 50% 5' L7 L7 5'       PPT                  supine, sitting, standing x15 each     Piriformis str    3x30"                   DKC   10x10"                   Seated multidirectional PB roll out   10x5" ea   10x5" ea                  Prayer str                        Bridging   2x10 3"                   TA in h/l with marching   20x3"     15x3"             Quadruped rocking     20x3" 20x5" 20x5"             Quadruped PPT     15x   15x3"             Gym program review       21'               Treadmill         10' 10' 10' 10'  10'     Standing multifidus chops           20x5" GTB Seated PB 2x10 5" GTB b/l Seated PB 2x10 5" GTB b/l       Seated multifidus push-out on PB           20x5" GTB           Multifidus walk-out             15x3" GTB b/l 15x3" GTB b/l       Seated PNF multifidus lifts             20x5" GTB 20x5" GTB       Kneeling hip flexor stretch               30"x3 each  30"x3 each                                                          Modalities  9/18 9/24 9/27 10/4 10/8 10/15 10/18

## 2019-12-06 ENCOUNTER — OFFICE VISIT (OUTPATIENT)
Dept: PHYSICAL THERAPY | Facility: REHABILITATION | Age: 69
End: 2019-12-06
Payer: COMMERCIAL

## 2019-12-06 DIAGNOSIS — M54.16 LUMBAR RADICULOPATHY: Primary | ICD-10-CM

## 2019-12-06 PROCEDURE — 97110 THERAPEUTIC EXERCISES: CPT | Performed by: PHYSICAL THERAPIST

## 2019-12-06 PROCEDURE — 97140 MANUAL THERAPY 1/> REGIONS: CPT | Performed by: PHYSICAL THERAPIST

## 2019-12-06 PROCEDURE — 97112 NEUROMUSCULAR REEDUCATION: CPT | Performed by: PHYSICAL THERAPIST

## 2019-12-06 NOTE — PROGRESS NOTES
Daily Note     Today's date: 2019  Patient name: Umesh Boland  : 1950  MRN: [id number]  Referring provider: Elisabeth Maguire MD  Dx:   Encounter Diagnosis     ICD-10-CM    1  Lumbar radiculopathy M54 16        Start Time: 0800  Stop Time: 2  Total time in clinic (min): 55 minutes    Subjective: Pt states the back is feeling better  Objective: See treatment diary below      Assessment: Pt required verbal and tactile cues to correctly achieve a posterior pelvic tilt in supine and sitting  Hip flexor/quad tightness is more limited on the L and he would benefit from continued stretch to reduce lumbar lordosis in standing  Pt will benefit from continued skilled outpatient PT to improve strength, to address therapy goals, to reduce pain, and improve function  Plan: Continue per plan of care  Progress treatment as tolerated  Precautions: DM, cardiac, HTN        Manual  9/18 9/24 9/27 10/4 10/8 10/15 10/18 10/22  12/2  12   Lumbar gapping - s/l   GR                DS   Left sciatic nerve glides   GR                   Left piriformis str    GR             Reassessment     Lumbar flexion MWM     GR GR GR GR GR DS    DS   Pelvic, sacral rocking     GR GR GR GR GR DS  DS  DS   Hip flexor stretch         DS DS         Exercise Diary  9/18 9/24 9/27 10/4 10/8 10/15 10/18 10/22  12/2  12   Recumbent bike   7' 7' 10'               VG   5' L7 5' L7 5' L7 5' L7 5' 50% 5' L7 L7 5'    L7 5'   PPT                  supine, sitting, standing x15 each  supine and standing @ wall x30   Piriformis str    3x30"                   DKC   10x10"                   Seated multidirectional PB roll out   10x5" ea   10x5" ea               10"x10   Prayer str                        Bridging   2x10 3"                   TA in h/l with marching   20x3"     15x3"             Quadruped rocking     20x3" 20x5" 20x5"             Quadruped PPT     15x   15x3"             Gym program review       20'             Treadmill         10' 10' 10' 10'  10'  10'   Standing multifidus chops           20x5" GTB Seated PB 2x10 5" GTB b/l Seated PB 2x10 5" GTB b/l       Seated multifidus push-out on PB           20x5" GTB           Multifidus walk-out             15x3" GTB b/l 15x3" GTB b/l       Seated PNF multifidus lifts             20x5" GTB 20x5" GTB       Kneeling hip flexor stretch               30"x3 each  30"x3 each  30"x3 each                                                        Modalities  9/18 9/24 9/27 10/4 10/8 10/15 10/18

## 2019-12-17 ENCOUNTER — OFFICE VISIT (OUTPATIENT)
Dept: PHYSICAL THERAPY | Facility: REHABILITATION | Age: 69
End: 2019-12-17
Payer: COMMERCIAL

## 2019-12-17 DIAGNOSIS — M54.16 LUMBAR RADICULOPATHY: Primary | ICD-10-CM

## 2019-12-17 PROCEDURE — 97110 THERAPEUTIC EXERCISES: CPT | Performed by: PHYSICAL THERAPIST

## 2019-12-17 PROCEDURE — 97140 MANUAL THERAPY 1/> REGIONS: CPT | Performed by: PHYSICAL THERAPIST

## 2019-12-17 PROCEDURE — 97112 NEUROMUSCULAR REEDUCATION: CPT | Performed by: PHYSICAL THERAPIST

## 2019-12-17 NOTE — PROGRESS NOTES
Daily Note     Today's date: 2019  Patient name: Karena Shanks  : 1950  MRN: [id number]  Referring provider: Kathy Medeiros MD  Dx:   Encounter Diagnosis     ICD-10-CM    1  Lumbar radiculopathy M54 16        Start Time: 1253  Stop Time: 4848  Total time in clinic (min): 45 minutes       Subjective: Pt states he feels like the back is doing a little better      Objective: See treatment diary below      Assessment: Pt continues to be challenged by TrA with posterior pelvic tilt in standing and required demonstration and verbal/tactile cues  He responds favorably to flexion exercises and manual flexion mobilizations  Pt will benefit from continued skilled outpatient PT to improve strength, to address therapy goals, to reduce pain, and improve function  Plan: Continue per plan of care  Progress treatment as tolerated         Precautions: DM, cardiac, HTN        Manual  12/17       10/22  12/2  12/6   Lumbar gapping - s/l DS            DS   Hams stretch DS               Piriformis stretch DS         Reassessment     Lumbar flexion MWM DS       DS    DS   Pelvic, sacral rocking DS       DS  DS  DS   Hip flexor stretch DS        DS DS         Exercise Diary     Recumbent bike               VG L7 5'           L7 5'   PPT Supine, sitting, standing 5"x20 each         supine, sitting, standing x15 each  supine and standing @ wall x30   Piriformis str                 DKC                Seated multidirectional PB roll out 15"x10 each            10"x10   Prayer str                Bridging               TA in h/l with marching               Quadruped rocking               Quadruped PPT               Gym program review               Treadmill          10'  10'   Standing multifidus chops               Seated multifidus push-out on PB               Multifidus walk-out               Seated PNF multifidus lifts               Kneeling hip flexor stretch 30"x4 each         30"x3 each  30"x3 each                                                        Modalities  9/18 9/24 9/27 10/4 10/8 10/15 10/18

## 2019-12-19 ENCOUNTER — OFFICE VISIT (OUTPATIENT)
Dept: PHYSICAL THERAPY | Facility: REHABILITATION | Age: 69
End: 2019-12-19
Payer: COMMERCIAL

## 2019-12-19 DIAGNOSIS — M54.16 LUMBAR RADICULOPATHY: Primary | ICD-10-CM

## 2019-12-19 PROCEDURE — 97112 NEUROMUSCULAR REEDUCATION: CPT | Performed by: PHYSICAL THERAPIST

## 2019-12-19 PROCEDURE — 97140 MANUAL THERAPY 1/> REGIONS: CPT | Performed by: PHYSICAL THERAPIST

## 2019-12-19 PROCEDURE — 97110 THERAPEUTIC EXERCISES: CPT | Performed by: PHYSICAL THERAPIST

## 2019-12-19 NOTE — PROGRESS NOTES
Daily Note     Today's date: 2019  Patient name: Yvrose Roth  : 1950  MRN: [id number]  Referring provider: Fátima Villanueva MD  Dx:   Encounter Diagnosis     ICD-10-CM    1  Lumbar radiculopathy M54 16        Start Time: 1728  Stop Time: 1300  Total time in clinic (min): 45 minutes       Subjective: Pt offers no complaints      Objective: See treatment diary below      Assessment: Pt continues to require max cues to achieve a posterior pelvic tilt in standing, but this improved with repetition  Pt will benefit from continued skilled outpatient PT to improve strength, to address therapy goals, to reduce pain, and improve function  Plan: Continue per plan of care  Progress treatment as tolerated         Precautions: DM, cardiac, HTN        Manual  12/17 12/19      10/22  12/2  12/6   Lumbar gapping - s/l DS DS           DS   Hams stretch DS DS              Piriformis stretch DS DS        Reassessment     Lumbar flexion MWM DS DS      DS    DS   Pelvic, sacral rocking DS DS      DS  DS  DS   Hip flexor stretch DS DS       DS DS         Exercise Diary     Recumbent bike  15'             VG L7 5' Ellip x5'          L7 5'   PPT Supine, sitting, standing 5"x20 each Standing 5" 2x30        supine, sitting, standing x15 each  supine and standing @ wall x30   Piriformis str                 DKC                Seated multidirectional PB roll out 15"x10 each 15"x10 each           10"x10   Prayer str                Bridging               TA in h/l with marching               Quadruped rocking               Quadruped PPT               Gym program review               Treadmill          10'  10'   Standing multifidus chops               Seated multifidus push-out on PB               Multifidus walk-out               Seated PNF multifidus lifts               Kneeling hip flexor stretch 30"x4 each 30"x4 each        30"x3 each  30"x3 each                                                        Modalities  9/18 9/24 9/27 10/4 10/8 10/15 10/18

## 2019-12-24 ENCOUNTER — OFFICE VISIT (OUTPATIENT)
Dept: PHYSICAL THERAPY | Facility: REHABILITATION | Age: 69
End: 2019-12-24
Payer: COMMERCIAL

## 2019-12-24 DIAGNOSIS — M54.16 LUMBAR RADICULOPATHY: Primary | ICD-10-CM

## 2019-12-24 PROCEDURE — 97140 MANUAL THERAPY 1/> REGIONS: CPT | Performed by: PHYSICAL THERAPIST

## 2019-12-24 PROCEDURE — 97110 THERAPEUTIC EXERCISES: CPT | Performed by: PHYSICAL THERAPIST

## 2019-12-24 PROCEDURE — 97112 NEUROMUSCULAR REEDUCATION: CPT | Performed by: PHYSICAL THERAPIST

## 2019-12-24 NOTE — PROGRESS NOTES
Daily Note     Today's date: 2019  Patient name: Sandra Gaona  : 1950  MRN: [id number]  Referring provider: Corazon Santiago MD  Dx:   Encounter Diagnosis     ICD-10-CM    1  Lumbar radiculopathy M54 16        Start Time: 710  Stop Time: 6413  Total time in clinic (min): 40 minutes       Subjective: Pt offers no complaints  Objective: See treatment diary below      Assessment:  Pt demonstrates improving ability to perform a posterior pelvic tilt in standing, but would benefit from continued practice  LE ROM remains limited as well  Pt will benefit from continued skilled outpatient PT to improve strength, to address therapy goals, to reduce pain, and improve function  Plan: Continue per plan of care  Progress treatment as tolerated         Precautions: DM, cardiac, HTN        Manual  12/17 12/19 12/24     10/22  12/2  12/6   Lumbar gapping - s/l DS DS DS          DS   Hams stretch DS DS DS             Piriformis stretch DS DS DS       Reassessment     Lumbar flexion MWM DS DS      DS    DS   Pelvic, sacral rocking DS DS DS     DS  DS  DS   Hip flexor stretch DS DS DS      DS DS         Exercise Diary     Recumbent bike  15'             VG L7 5' Ellip x5' VG L7 x5'         L7 5'   PPT Supine, sitting, standing 5"x20 each Standing 5" 2x30 Standing 5"2x30       supine, sitting, standing x15 each  supine and standing @ wall x30   Piriformis str                 DKC                Seated multidirectional PB roll out 15"x10 each 15"x10 each 15"x10 each          10"x10   Prayer str                Bridging               TA in h/l with marching               Quadruped rocking               Quadruped PPT               Gym program review               Treadmill          10'  10'   Standing multifidus chops               Seated multifidus push-out on PB               Multifidus walk-out               Seated PNF multifidus lifts               Kneeling hip flexor stretch 30"x4 each 30"x4 each 30"x4 each       30"x3 each  30"x3 each                                                        Modalities  9/18 9/24 9/27 10/4 10/8 10/15 10/18

## 2019-12-30 ENCOUNTER — OFFICE VISIT (OUTPATIENT)
Dept: PHYSICAL THERAPY | Facility: REHABILITATION | Age: 69
End: 2019-12-30
Payer: COMMERCIAL

## 2019-12-30 DIAGNOSIS — M54.16 LUMBAR RADICULOPATHY: Primary | ICD-10-CM

## 2019-12-30 PROCEDURE — 97112 NEUROMUSCULAR REEDUCATION: CPT | Performed by: PHYSICAL THERAPIST

## 2019-12-30 PROCEDURE — 97140 MANUAL THERAPY 1/> REGIONS: CPT | Performed by: PHYSICAL THERAPIST

## 2019-12-30 PROCEDURE — 97110 THERAPEUTIC EXERCISES: CPT | Performed by: PHYSICAL THERAPIST

## 2019-12-30 NOTE — PROGRESS NOTES
Daily Note     Today's date: 2019  Patient name: Chong Davila  : 1950  MRN: [id number]  Referring provider: Cinda Forrest MD  Dx:   Encounter Diagnosis     ICD-10-CM    1  Lumbar radiculopathy M54 16        Start Time:   Stop Time: 845  Total time in clinic (min): 32 minutes       Subjective: Pt states the back is feeling a little better and the exercises are helping  Objective: See treatment diary below  Tx time shortened due to pt being late then requesting to leave by 8:45am      Assessment:  Pt continues to require initial cues to properly perform standing posterior pelvic tilt with TrA contraction  Pt will benefit from continued skilled outpatient PT to improve strength, to address therapy goals, to reduce pain, and improve function  Plan: Continue per plan of care  Progress treatment as tolerated         Precautions: DM, cardiac, HTN        Manual  12/17 12/19 12/24 12/30    10/22  12/2  12/6   Lumbar gapping - s/l DS DS DS DS         DS   Hams stretch DS DS DS DS            Piriformis stretch DS DS DS DS      Reassessment     Lumbar flexion MWM DS DS  DS    DS    DS   Pelvic, sacral rocking DS DS DS DS    DS  DS  DS   Hip flexor stretch DS DS DS DS     DS DS         Exercise Diary     Recumbent bike  15'  TM x10'           VG L7 5' Ellip x5' VG L7 x5'         L7 5'   PPT Supine, sitting, standing 5"x20 each Standing 5" 2x30 Standing 5"2x30 Standing 5"2x30      supine, sitting, standing x15 each  supine and standing @ wall x30   Piriformis str                 DKC                Seated multidirectional PB roll out 15"x10 each 15"x10 each 15"x10 each 15"x10 each         10"x10   Prayer str                Bridging               TA in h/l with marching               Quadruped rocking               Quadruped PPT               Gym program review               Treadmill          10'  10'   Standing multifidus chops               Seated multifidus push-out on PB               Multifidus walk-out               Seated PNF multifidus lifts               Kneeling hip flexor stretch 30"x4 each 30"x4 each 30"x4 each       30"x3 each  30"x3 each                                                        Modalities  9/18 9/24 9/27 10/4 10/8 10/15 10/18

## 2020-02-01 ENCOUNTER — TELEPHONE (OUTPATIENT)
Dept: OTHER | Facility: OTHER | Age: 70
End: 2020-02-01

## 2020-02-01 DIAGNOSIS — I10 BENIGN ESSENTIAL HYPERTENSION: Primary | ICD-10-CM

## 2020-02-01 NOTE — TELEPHONE ENCOUNTER
Germán Polanco calling to inform that the medication Valsartan 320 MG is on back order  He wants to know if you can send a different medication to the [facility] thanks

## 2020-02-03 RX ORDER — IRBESARTAN 300 MG/1
300 TABLET ORAL
Qty: 90 TABLET | Refills: 3 | Status: SHIPPED | OUTPATIENT
Start: 2020-02-03 | End: 2020-08-26 | Stop reason: SDUPTHER

## 2020-02-04 NOTE — PROGRESS NOTES
PT DISCHARGE SUMMARY    Pt has not returned for follow up in >30 days and is therefore discharged for therapy at this time

## 2020-02-10 ENCOUNTER — OFFICE VISIT (OUTPATIENT)
Dept: FAMILY MEDICINE CLINIC | Facility: CLINIC | Age: 70
End: 2020-02-10
Payer: COMMERCIAL

## 2020-02-10 VITALS
HEART RATE: 73 BPM | DIASTOLIC BLOOD PRESSURE: 70 MMHG | HEIGHT: 66 IN | RESPIRATION RATE: 16 BRPM | TEMPERATURE: 99.2 F | WEIGHT: 197.8 LBS | OXYGEN SATURATION: 98 % | BODY MASS INDEX: 31.79 KG/M2 | SYSTOLIC BLOOD PRESSURE: 128 MMHG

## 2020-02-10 DIAGNOSIS — J20.9 ACUTE BRONCHITIS, UNSPECIFIED ORGANISM: Primary | ICD-10-CM

## 2020-02-10 PROCEDURE — 3078F DIAST BP <80 MM HG: CPT | Performed by: FAMILY MEDICINE

## 2020-02-10 PROCEDURE — 99213 OFFICE O/P EST LOW 20 MIN: CPT | Performed by: FAMILY MEDICINE

## 2020-02-10 PROCEDURE — 2022F DILAT RTA XM EVC RTNOPTHY: CPT | Performed by: FAMILY MEDICINE

## 2020-02-10 PROCEDURE — 4040F PNEUMOC VAC/ADMIN/RCVD: CPT | Performed by: FAMILY MEDICINE

## 2020-02-10 PROCEDURE — 3008F BODY MASS INDEX DOCD: CPT | Performed by: FAMILY MEDICINE

## 2020-02-10 PROCEDURE — 1160F RVW MEDS BY RX/DR IN RCRD: CPT | Performed by: FAMILY MEDICINE

## 2020-02-10 PROCEDURE — 1036F TOBACCO NON-USER: CPT | Performed by: FAMILY MEDICINE

## 2020-02-10 PROCEDURE — 3074F SYST BP LT 130 MM HG: CPT | Performed by: FAMILY MEDICINE

## 2020-02-10 RX ORDER — ALBUTEROL SULFATE 90 UG/1
2 AEROSOL, METERED RESPIRATORY (INHALATION) EVERY 4 HOURS PRN
Qty: 1 INHALER | Refills: 1 | Status: SHIPPED | OUTPATIENT
Start: 2020-02-10 | End: 2020-06-15 | Stop reason: ALTCHOICE

## 2020-02-10 RX ORDER — AZITHROMYCIN 250 MG/1
TABLET, FILM COATED ORAL
Qty: 6 TABLET | Refills: 0 | Status: SHIPPED | OUTPATIENT
Start: 2020-02-10 | End: 2020-02-14

## 2020-02-10 RX ORDER — BENZONATATE 100 MG/1
100 CAPSULE ORAL 3 TIMES DAILY PRN
Qty: 20 CAPSULE | Refills: 0 | Status: SHIPPED | OUTPATIENT
Start: 2020-02-10 | End: 2020-06-15 | Stop reason: ALTCHOICE

## 2020-02-10 NOTE — PROGRESS NOTES
FAMILY PRACTICE OFFICE VISIT       NAME: Marlon Lechuga  AGE: 68 y o  SEX: male       : 1950        MRN: [id number]        Assessment and Plan     Problem List Items Addressed This Visit     None      Visit Diagnoses     Acute bronchitis, unspecified organism    -  Primary    Relevant Medications    azithromycin (ZITHROMAX) 250 mg tablet    albuterol (PROVENTIL HFA,VENTOLIN HFA) 90 mcg/act inhaler    benzonatate (TESSALON PERLES) 100 mg capsule       Patient presents for evaluation of ongoing URI symptoms  Exam is consistent with acute bronchitis  I advised him to rest, drink plenty of fluids, take Tylenol and plain Mucinex over-the-counter  Will treat patient with combination of Z-Jet, albuterol inhaler, 2 puffs every 4-6 hours and Tessalon Perles  I strongly advised patient to contact me in 48 hours if his symptoms are not improving significantly or if fevers persisting  There are no Patient Instructions on file for this visit  Discussed with the patient and all questioned fully answered  He will call me if any problems arise  M*Modal software was used to dictate this note  It may contain errors with dictating incorrect words/spelling  Please contact provider directly with any questions  Chief Complaint     Chief Complaint   Patient presents with    Sore Throat    Nasal Congestion    Headache       History of Present Illness     Cold s/o  X 1 week  Patient is complaining of cough, dry, poorly productive, chest tightness, upper back discomfort  Sinus pressure, persistent scratchy throat, retro-orbital pressure  He is feeling fatigued and has been using Tylenol for low-grade fever  Patient was seen at urgent care center on  and was diagnosed with influenza  Patient did not have any laboratory testing  [facility] :Willian Bardales prescription  Patient did not notice any improvement        Sore Throat    Associated symptoms include congestion, coughing and headaches ( sinus)  Pertinent negatives include no shortness of breath  Headache    Associated symptoms include coughing, a fever (Low-grade) and a sore throat  Review of Systems   Review of Systems   Constitutional: Positive for fatigue and fever (Low-grade)  HENT: Positive for congestion, postnasal drip, sinus pain and sore throat  Respiratory: Positive for cough and chest tightness  Negative for shortness of breath and wheezing  Cardiovascular: Negative  Gastrointestinal: Negative  Neurological: Positive for headaches ( sinus)         Active Problem List     Patient Active Problem List   Diagnosis    Anemia    Arthralgia of knee, left    Benign enlargement of prostate    Benign essential hypertension    Complete left bundle branch block (LBBB)    Radiculopathy affecting upper extremity    Sickle cell trait ([address] )    Spinal stenosis    Mild obstructive sleep apnea    Type 2 diabetes, diet controlled ([address] )    Hyperlipidemia       Past Medical History:  Past Medical History:   Diagnosis Date    Anemia     mild anemia    Back pain     Heart burn     History of chest pain     Hypertension     Palpitations     Problems with hearing     Problems with swallowing     Sickle cell trait ([address] )     last assessed 10/31/14    Snoring     SOB (shortness of breath)     Spinal stenosis     ddd spinal stenosis    Swelling        Past Surgical History:  Past Surgical History:   Procedure Laterality Date    BACK SURGERY      lower back    COLONOSCOPY  01/2016    due 2026, Linden Hernandez  12/07       Family History:  Family History   Problem Relation Age of Onset    Diabetes Father     Sickle cell anemia Family     Diabetes Sister     Cancer Brother        Social History:  Social History     Socioeconomic History    Marital status: /Civil Union     Spouse name: Not on file    Number of children: Not on file    Years of education: Not on file    Highest education level: Not on file   Occupational History    Not on file   Social Needs    Financial resource strain: Not on file    Food insecurity:     Worry: Not on file     Inability: Not on file    Transportation needs:     Medical: Not on file     Non-medical: Not on file   Tobacco Use    Smoking status: Never Smoker    Smokeless tobacco: Never Used   Substance and Sexual Activity    Alcohol use: No    Drug use: No    Sexual activity: Not on file   Lifestyle    Physical activity:     Days per week: Not on file     Minutes per session: Not on file    Stress: Not on file   Relationships    Social connections:     Talks on phone: Not on file     Gets together: Not on file     Attends [employer] service: Not on file     Active member of club or organization: Not on file     Attends meetings of clubs or organizations: Not on file     Relationship status: Not on file    Intimate partner violence:     Fear of current or ex partner: Not on file     Emotionally abused: Not on file     Physically abused: Not on file     Forced sexual activity: Not on file   Other Topics Concern    Not on file   Social History Narrative    Not on file           Objective     Vitals:    02/10/20 0922 02/10/20 0945   BP: 160/82 128/70   BP Location: Left arm    Patient Position: Sitting    Cuff Size: Adult    Pulse: 73    Resp: 16    Temp: 99 2 °F (37 3 °C)    TempSrc: Tympanic    SpO2: 98%    Weight: 89 7 kg (197 lb 12 8 oz)    Height: 5' 6" (1 676 m)      Wt Readings from Last 3 Encounters:   02/10/20 89 7 kg (197 lb 12 8 oz)   10/11/19 89 8 kg (198 lb)   09/05/19 90 9 kg (200 lb 6 4 oz)       Physical Exam   Constitutional: He is oriented to person, place, and time  He appears well-developed and well-nourished  HENT:   Head: Normocephalic and atraumatic  Right Ear: Tympanic membrane and ear canal normal    Left Ear: Tympanic membrane and ear canal normal    Nose: Mucosal edema present     Mouth/Throat: Posterior oropharyngeal erythema (Bright oropharyngeal erythema, no exudate) present  No oropharyngeal exudate  Eyes: Conjunctivae are normal    Neck: Neck supple  Cardiovascular: Normal rate, regular rhythm and normal heart sounds  No murmur heard  Pulmonary/Chest: Effort normal  No respiratory distress  He has no wheezes  He has no rales  Increased expiratory phase, few coarse sounds at bases, no wheezing, no rales, no rhonchi   Musculoskeletal: Normal range of motion  Neurological: He is alert and oriented to person, place, and time  Psychiatric: He has a normal mood and affect  His behavior is normal    Nursing note and vitals reviewed  Pertinent Laboratory/Diagnostic Studies:  Lab Results   Component Value Date    BUN 10 10/02/2019    CREATININE 0 92 10/02/2019    CALCIUM 9 3 10/02/2019     10/27/2017    K 4 4 10/02/2019    CO2 31 10/02/2019     10/02/2019     Lab Results   Component Value Date    ALT 25 10/02/2019    AST 19 10/02/2019    ALKPHOS 68 10/02/2019    BILITOT 0 8 10/27/2017       Lab Results   Component Value Date    WBC 4 0 10/02/2019    HGB 12 0 (L) 10/02/2019    HCT 35 1 (L) 10/02/2019    MCV 93 9 10/02/2019     10/02/2019       Lab Results   Component Value Date    TSH 1 98 10/02/2019       Lab Results   Component Value Date    CHOL 113 10/27/2017     Lab Results   Component Value Date    TRIG 80 10/02/2019     Lab Results   Component Value Date    HDL 42 10/02/2019     No results found for: [facility]  Lab Results   Component Value Date    HGBA1C 5 8 (H) 10/02/2019       Results for orders placed or performed in visit on 10/25/19    Diabetes Eye Exam   Result Value Ref Range    Right Eye Diabetic Retinopathy None     Left Eye Diabetic Retinopathy None        No orders of the defined types were placed in this encounter        ALLERGIES:  Allergies   Allergen Reactions    Levofloxacin Rash     Other reaction(s): Unknown Allergic Reaction       Current Medications     Current Outpatient Medications Medication Sig Dispense Refill    amLODIPine (NORVASC) 10 mg tablet Take 1 tablet by mouth daily      aspirin 81 mg chewable tablet Chew 1 tablet daily      Blood Glucose Monitoring Suppl (ONE TOUCH ULTRA 2) w/Device KIT Use as directed  0    hydrochlorothiazide (HYDRODIURIL) 25 mg tablet Take 1 tablet (25 mg total) by mouth daily 90 tablet 3    irbesartan (AVAPRO) 300 mg tablet Take 1 tablet (300 mg total) by mouth daily at bedtime 90 tablet 3    methocarbamol (ROBAXIN) 750 mg tablet Take 1 tablet at bedtime as needed for painful back spasm 30 tablet 1    ONE TOUCH ULTRA TEST test strip TEST ONCE DAILY 100 each 3    ONETOUCH DELICA LANCETS 54E MISC daily  3    rosuvastatin (CRESTOR) 20 MG tablet Take 1 tablet (20 mg total) by mouth daily 90 tablet 3    albuterol (PROVENTIL HFA,VENTOLIN HFA) 90 mcg/act inhaler Inhale 2 puffs every 4 (four) hours as needed for wheezing or shortness of breath (cough) 1 Inhaler 1    azithromycin (ZITHROMAX) 250 mg tablet Take 2 tablets today then 1 tablet daily x 4 days 6 tablet 0    benzonatate (TESSALON PERLES) 100 mg capsule Take 1 capsule (100 mg total) by mouth 3 (three) times a day as needed for cough 20 capsule 0     No current facility-administered medications for this visit  There are no discontinued medications      Health Maintenance     Health Maintenance   Topic Date Due    Hepatitis C Screening  1950    DTaP,Tdap,and Td Vaccines (1 - Tdap) 03/08/1959    Annual Physical  03/08/1966    Hepatitis B Vaccine (1 of 3 - Risk 3-dose series) 03/08/1967    Falls: Plan of Care  03/08/2013    HEMOGLOBIN A1C  04/02/2020    Diabetic Foot Exam  09/05/2020    BMI: Followup Plan  09/08/2020    Fall Risk  09/18/2020    Depression Screening PHQ  09/18/2020    BMI: Adult  10/11/2020    DM Eye Exam  10/25/2021    CRC Screening: Colonoscopy  01/05/2026    Influenza Vaccine  Completed    Pneumococcal Vaccine: 65+ Years  Completed    Pneumococcal Vaccine: Pediatrics (0 to 5 Years) and At-Risk Patients (6 to 59 Years)  Aged Out    HIB Vaccine  Aged Out    IPV Vaccine  Aged Out    Hepatitis A Vaccine  Aged Out    Meningococcal ACWY Vaccine  Aged Out    HPV Vaccine  Aged [employer] History   Administered Date(s) Administered    INFLUENZA 09/20/2018    Influenza Split High Dose Preservative Free IM 10/14/2016, 10/27/2017    Influenza TIV (IM) 10/12/2007, 09/25/2009, 01/17/2014, 10/22/2014    Influenza, high dose seasonal 0 5 mL 09/20/2018, 09/10/2019    Pneumococcal Conjugate 13-Valent 10/14/2016    Pneumococcal Polysaccharide PPV23 04/26/2013       Trever Alfonso MD

## 2020-06-15 ENCOUNTER — OFFICE VISIT (OUTPATIENT)
Dept: FAMILY MEDICINE CLINIC | Facility: CLINIC | Age: 70
End: 2020-06-15
Payer: COMMERCIAL

## 2020-06-15 VITALS
TEMPERATURE: 97.1 F | OXYGEN SATURATION: 97 % | RESPIRATION RATE: 16 BRPM | SYSTOLIC BLOOD PRESSURE: 130 MMHG | DIASTOLIC BLOOD PRESSURE: 70 MMHG | BODY MASS INDEX: 32.05 KG/M2 | WEIGHT: 199.4 LBS | HEART RATE: 61 BPM | HEIGHT: 66 IN

## 2020-06-15 DIAGNOSIS — L30.9 DERMATITIS: Primary | ICD-10-CM

## 2020-06-15 DIAGNOSIS — L24.9 IRRITANT CONTACT DERMATITIS, UNSPECIFIED TRIGGER: ICD-10-CM

## 2020-06-15 PROCEDURE — 3078F DIAST BP <80 MM HG: CPT | Performed by: FAMILY MEDICINE

## 2020-06-15 PROCEDURE — 3075F SYST BP GE 130 - 139MM HG: CPT | Performed by: FAMILY MEDICINE

## 2020-06-15 PROCEDURE — 1160F RVW MEDS BY RX/DR IN RCRD: CPT | Performed by: FAMILY MEDICINE

## 2020-06-15 PROCEDURE — 4040F PNEUMOC VAC/ADMIN/RCVD: CPT | Performed by: FAMILY MEDICINE

## 2020-06-15 PROCEDURE — 99213 OFFICE O/P EST LOW 20 MIN: CPT | Performed by: FAMILY MEDICINE

## 2020-06-15 PROCEDURE — 2022F DILAT RTA XM EVC RTNOPTHY: CPT | Performed by: FAMILY MEDICINE

## 2020-06-15 PROCEDURE — 3008F BODY MASS INDEX DOCD: CPT | Performed by: FAMILY MEDICINE

## 2020-06-15 PROCEDURE — 1036F TOBACCO NON-USER: CPT | Performed by: FAMILY MEDICINE

## 2020-06-15 RX ORDER — PREDNISONE 20 MG/1
TABLET ORAL
Qty: 10 TABLET | Refills: 0 | Status: SHIPPED | OUTPATIENT
Start: 2020-06-15 | End: 2020-09-16

## 2020-08-25 DIAGNOSIS — R73.02 IMPAIRED GLUCOSE TOLERANCE: ICD-10-CM

## 2020-08-25 DIAGNOSIS — E11.9 TYPE 2 DIABETES, DIET CONTROLLED (HCC): ICD-10-CM

## 2020-08-25 DIAGNOSIS — I10 BENIGN ESSENTIAL HYPERTENSION: Primary | ICD-10-CM

## 2020-08-25 DIAGNOSIS — E78.5 HYPERLIPIDEMIA, UNSPECIFIED HYPERLIPIDEMIA TYPE: ICD-10-CM

## 2020-08-25 DIAGNOSIS — Z12.5 ENCOUNTER FOR PROSTATE CANCER SCREENING: ICD-10-CM

## 2020-08-25 DIAGNOSIS — E78.00 HIGH CHOLESTEROL: ICD-10-CM

## 2020-08-25 RX ORDER — IRBESARTAN 300 MG/1
300 TABLET ORAL
Qty: 90 TABLET | Refills: 3 | OUTPATIENT
Start: 2020-08-25

## 2020-08-25 RX ORDER — ROSUVASTATIN CALCIUM 20 MG/1
20 TABLET, COATED ORAL DAILY
Qty: 90 TABLET | Refills: 3 | OUTPATIENT
Start: 2020-08-25

## 2020-08-25 RX ORDER — AMLODIPINE BESYLATE 10 MG/1
10 TABLET ORAL DAILY
OUTPATIENT
Start: 2020-08-25

## 2020-08-25 NOTE — TELEPHONE ENCOUNTER
Please contact patient  We received request refills from his mail away pharmacy  I do see that some of his medications are being prescribed by Cardiology in [place] and I would like to avoid prescription duplication or discrepancy  Please clarify with patient directly if he needs any prescriptions  Current dose of rosuvastatin as per our chart is 20 mg daily, Cardiology has at 10 mg daily, please clarify  Patient is also due for routine blood work and annual checkup  I will place orders    Thank you

## 2020-08-26 PROCEDURE — 4010F ACE/ARB THERAPY RXD/TAKEN: CPT | Performed by: FAMILY MEDICINE

## 2020-08-26 RX ORDER — AMLODIPINE BESYLATE 10 MG/1
10 TABLET ORAL DAILY
Qty: 90 TABLET | Refills: 3 | Status: SHIPPED | OUTPATIENT
Start: 2020-08-26 | End: 2020-12-21

## 2020-08-26 RX ORDER — ROSUVASTATIN CALCIUM 10 MG/1
10 TABLET, COATED ORAL DAILY
Qty: 90 TABLET | Refills: 3 | Status: SHIPPED | OUTPATIENT
Start: 2020-08-26

## 2020-08-26 RX ORDER — IRBESARTAN 300 MG/1
300 TABLET ORAL
Qty: 90 TABLET | Refills: 3 | Status: SHIPPED | OUTPATIENT
Start: 2020-08-26 | End: 2021-09-07

## 2020-09-12 LAB
ALBUMIN SERPL-MCNC: 4.6 G/DL (ref 3.6–5.1)
ALBUMIN/GLOB SERPL: 1.8 (CALC) (ref 1–2.5)
ALP SERPL-CCNC: 78 U/L (ref 35–144)
ALT SERPL-CCNC: 29 U/L (ref 9–46)
AST SERPL-CCNC: 16 U/L (ref 10–35)
BASOPHILS # BLD AUTO: 12 CELLS/UL (ref 0–200)
BASOPHILS NFR BLD AUTO: 0.3 %
BILIRUB SERPL-MCNC: 1.2 MG/DL (ref 0.2–1.2)
BUN SERPL-MCNC: 13 MG/DL (ref 7–25)
BUN/CREAT SERPL: ABNORMAL (CALC) (ref 6–22)
CALCIUM SERPL-MCNC: 9.5 MG/DL (ref 8.6–10.3)
CHLORIDE SERPL-SCNC: 104 MMOL/L (ref 98–110)
CHOLEST SERPL-MCNC: 124 MG/DL
CHOLEST/HDLC SERPL: 2.9 (CALC)
CO2 SERPL-SCNC: 30 MMOL/L (ref 20–32)
CREAT SERPL-MCNC: 0.9 MG/DL (ref 0.7–1.18)
EOSINOPHIL # BLD AUTO: 31 CELLS/UL (ref 15–500)
EOSINOPHIL NFR BLD AUTO: 0.8 %
ERYTHROCYTE [DISTWIDTH] IN BLOOD BY AUTOMATED COUNT: 12.9 % (ref 11–15)
GLOBULIN SER CALC-MCNC: 2.5 G/DL (CALC) (ref 1.9–3.7)
GLUCOSE SERPL-MCNC: 238 MG/DL (ref 65–99)
HBA1C MFR BLD: 9.2 % OF TOTAL HGB
HCT VFR BLD AUTO: 37.3 % (ref 38.5–50)
HDLC SERPL-MCNC: 43 MG/DL
HGB BLD-MCNC: 12.7 G/DL (ref 13.2–17.1)
LDLC SERPL CALC-MCNC: 61 MG/DL (CALC)
LYMPHOCYTES # BLD AUTO: 1427 CELLS/UL (ref 850–3900)
LYMPHOCYTES NFR BLD AUTO: 36.6 %
MCH RBC QN AUTO: 32.1 PG (ref 27–33)
MCHC RBC AUTO-ENTMCNC: 34 G/DL (ref 32–36)
MCV RBC AUTO: 94.2 FL (ref 80–100)
MONOCYTES # BLD AUTO: 351 CELLS/UL (ref 200–950)
MONOCYTES NFR BLD AUTO: 9 %
NEUTROPHILS # BLD AUTO: 2079 CELLS/UL (ref 1500–7800)
NEUTROPHILS NFR BLD AUTO: 53.3 %
NONHDLC SERPL-MCNC: 81 MG/DL (CALC)
PLATELET # BLD AUTO: 140 THOUSAND/UL (ref 140–400)
PMV BLD REES-ECKER: 11.5 FL (ref 7.5–12.5)
POTASSIUM SERPL-SCNC: 4.5 MMOL/L (ref 3.5–5.3)
PROT SERPL-MCNC: 7.1 G/DL (ref 6.1–8.1)
PSA SERPL-MCNC: 1.3 NG/ML
RBC # BLD AUTO: 3.96 MILLION/UL (ref 4.2–5.8)
SL AMB EGFR AFRICAN AMERICAN: 99 ML/MIN/1.73M2
SL AMB EGFR NON AFRICAN AMERICAN: 85 ML/MIN/1.73M2
SODIUM SERPL-SCNC: 140 MMOL/L (ref 135–146)
TRIGL SERPL-MCNC: 115 MG/DL
TSH SERPL-ACNC: 1.87 MIU/L (ref 0.4–4.5)
WBC # BLD AUTO: 3.9 THOUSAND/UL (ref 3.8–10.8)

## 2020-09-13 ENCOUNTER — TELEPHONE (OUTPATIENT)
Dept: FAMILY MEDICINE CLINIC | Facility: CLINIC | Age: 70
End: 2020-09-13

## 2020-09-14 NOTE — TELEPHONE ENCOUNTER
Please contact patient  I reviewed his blood work  It indicates very high fasting blood sugar of 238 and elevated hemoglobin A1c of 9 2 consistent with type 2 diabetes  I do need to see patient ASAP for further evaluation and treatment  I can see him on Tuesday, September 15th at  7:30 am or Wednesday, September 16th at 8 a m  If those dates are not suitable for patient-please let me know      Thank you

## 2020-09-16 ENCOUNTER — OFFICE VISIT (OUTPATIENT)
Dept: FAMILY MEDICINE CLINIC | Facility: CLINIC | Age: 70
End: 2020-09-16
Payer: COMMERCIAL

## 2020-09-16 VITALS
WEIGHT: 196.8 LBS | SYSTOLIC BLOOD PRESSURE: 128 MMHG | HEART RATE: 64 BPM | OXYGEN SATURATION: 96 % | HEIGHT: 66 IN | BODY MASS INDEX: 31.63 KG/M2 | TEMPERATURE: 97.6 F | DIASTOLIC BLOOD PRESSURE: 62 MMHG | RESPIRATION RATE: 16 BRPM

## 2020-09-16 DIAGNOSIS — I10 BENIGN ESSENTIAL HYPERTENSION: ICD-10-CM

## 2020-09-16 DIAGNOSIS — M77.02 MEDIAL EPICONDYLITIS OF LEFT ELBOW: ICD-10-CM

## 2020-09-16 DIAGNOSIS — Z23 INFLUENZA VACCINE NEEDED: ICD-10-CM

## 2020-09-16 DIAGNOSIS — E11.69 DIABETES MELLITUS TYPE 2 IN OBESE (HCC): Primary | ICD-10-CM

## 2020-09-16 DIAGNOSIS — E66.9 DIABETES MELLITUS TYPE 2 IN OBESE (HCC): Primary | ICD-10-CM

## 2020-09-16 DIAGNOSIS — M48.07 SPINAL STENOSIS OF LUMBOSACRAL REGION: ICD-10-CM

## 2020-09-16 DIAGNOSIS — I44.7 COMPLETE LEFT BUNDLE BRANCH BLOCK (LBBB): ICD-10-CM

## 2020-09-16 DIAGNOSIS — E78.5 HYPERLIPIDEMIA, UNSPECIFIED HYPERLIPIDEMIA TYPE: ICD-10-CM

## 2020-09-16 DIAGNOSIS — D57.3 SICKLE CELL TRAIT (HCC): ICD-10-CM

## 2020-09-16 PROBLEM — E11.9 TYPE 2 DIABETES, DIET CONTROLLED (HCC): Status: RESOLVED | Noted: 2018-10-31 | Resolved: 2020-09-16

## 2020-09-16 PROCEDURE — 90662 IIV NO PRSV INCREASED AG IM: CPT | Performed by: FAMILY MEDICINE

## 2020-09-16 PROCEDURE — 90471 IMMUNIZATION ADMIN: CPT | Performed by: FAMILY MEDICINE

## 2020-09-16 PROCEDURE — 3078F DIAST BP <80 MM HG: CPT | Performed by: FAMILY MEDICINE

## 2020-09-16 PROCEDURE — 3074F SYST BP LT 130 MM HG: CPT | Performed by: FAMILY MEDICINE

## 2020-09-16 PROCEDURE — 99215 OFFICE O/P EST HI 40 MIN: CPT | Performed by: FAMILY MEDICINE

## 2020-09-16 PROCEDURE — 1036F TOBACCO NON-USER: CPT | Performed by: FAMILY MEDICINE

## 2020-09-16 PROCEDURE — 1160F RVW MEDS BY RX/DR IN RCRD: CPT | Performed by: FAMILY MEDICINE

## 2020-09-16 RX ORDER — LANCETS 33 GAUGE
EACH MISCELLANEOUS
Qty: 180 EACH | Refills: 3 | Status: SHIPPED | OUTPATIENT
Start: 2020-09-16 | End: 2020-10-21 | Stop reason: SDUPTHER

## 2020-09-16 RX ORDER — METFORMIN HYDROCHLORIDE 500 MG/1
TABLET, EXTENDED RELEASE ORAL
Qty: 270 TABLET | Refills: 3 | Status: SHIPPED | OUTPATIENT
Start: 2020-09-16 | End: 2020-10-01 | Stop reason: SINTOL

## 2020-09-16 NOTE — Clinical Note
Good morning,  New patient referral   New onset type 2 diabetes  Please see my note for details  Thank you!

## 2020-09-16 NOTE — PROGRESS NOTES
FAMILY PRACTICE OFFICE VISIT       NAME: Marlon Lechuga  AGE: 65 y o  SEX: male       : 1950        MRN: [id number]        Assessment and Plan     Problem List Items Addressed This Visit        Endocrine    Diabetes mellitus type 2 in obese ([facility] ) - Primary (Chronic)       Lab Results   Component Value Date    HGBA1C 9 2 (H) 2020 ·   New onset of type 2 diabetes  · Start metformin 500 mg daily x5 days then 1000 mg daily x5 days then increase dose to 1500 mg daily with dinner  · Referral for diabetic nutrition classes  · Referral to clinical pharmacy for close follow-up  · Start Accu-Cheks every morning and occasionally at bedtime  · Importance of exercise and weight loss as well as adherence to low carbohydrate diet emphasized  · Patient is due for diabetic eye exam  · Diabetic foot exam performed today  · Patient will repeat blood work in 3 months         Relevant Medications    OneTouch Delica Lancets 72R MISC    glucose blood test strip    metFORMIN (GLUCOPHAGE-XR) 500 mg 24 hr tablet    Other Relevant Orders    CBC    Comprehensive metabolic panel    Hemoglobin A1C    Ambulatory referral to Diabetic Education    Ambulatory referral to clinical pharmacy       Cardiovascular and Mediastinum    Benign essential hypertension     Blood pressure is well controlled, continue regimen of Avapro and amlodipine  Complete left bundle branch block (LBBB)     · Patient denies symptoms of chest pain, dyspnea or palpitations  · Continue regimen for hypertension and hyperlipidemia:  Patient remains on aspirin  · Patient is following up with [facility]            Musculoskeletal and Integument    Medial epicondylitis of left elbow     · Likely due to overuse  · Patient will start over-the-counter 1% Voltaren gel  · I advised him to wear elbow support brace            Other    Sickle cell trait ([facility] )     · Recent blood work revealed stable hemoglobin of 12 7    · Patient was evaluated by Hematology in the past            Spinal stenosis     · Chronic low back pain, stable left lower extremity radiculopathy, numbness and tingling  · Continue home stretching PT  · History of low back surgery in 2012          Hyperlipidemia     · LDL is at goal, continue Crestor 10 mg daily           Other Visit Diagnoses     Influenza vaccine needed        Relevant Orders    influenza vaccine, high-dose, PF 0 7 mL (FLUZONE HIGH-DOSE) (Completed)      Patient presents for follow-up of chronic medical conditions  New onset of type 2 diabetes  Assessment and plan as outlined above  Patient will start metformin and scheduled followups with Optometry and [facility] for nutrition classes  Referral to clinical pharmacist for close follow-up  Blood work on follow-up in 3 months  Flu vaccine was administered today  Long discussion with patient about importance of lifestyle changes, low-carbohydrate diet, exercise, weight loss    BMI Counseling: Body mass index is 31 76 kg/m²  The BMI is above normal  Nutrition recommendations include decreasing portion sizes, encouraging healthy choices of fruits and vegetables, consuming healthier snacks, limiting drinks that contain sugar and moderation in carbohydrate intake  Exercise recommendations include exercising 3-5 times per week  I have spent 45 minutes with Patient  today in which greater than 50% of this time was spent in counseling/coordination of care regarding Diagnostic results, Prognosis, Risks and benefits of tx options, Intructions for management, Patient and family education, Importance of tx compliance, Risk factor reductions and Impressions  There are no Patient Instructions on file for this visit  Discussed with the patient and all questioned fully answered  He will call me if any problems arise  M*Modal software was used to dictate this note  It may contain errors with dictating incorrect words/spelling  Please contact provider directly with any questions  Chief Complaint     Chief Complaint   Patient presents with    Follow-up     Pt is here to f/u diabetes       History of Present Illness     Patient presents follow-up after results of recent blood work  Patient with longstanding history of hypertension, hyperlipidemia, sickle-cell trait, left bundle branch block, and impaired glucose tolerance  His hemoglobin A1c has been monitored for years and remained well controlled with dietary and lifestyle changes  It had spiked up to 6 7 back in 2018 but has improved down to 5 7 in 2019  Current hemoglobin A1c is 9 2 consistent with new onset of type 2 diabetes  Patient admits to recent dietary indiscretions and lack of exercise  He admits to increased thirst lately by denies symptoms of polyuria  No visual changes  Blood work performed on September 11th reveals PSA 1 3, normal TS H   CBC reveals mild stable decrease of hemoglobin at 12 7, otherwise normal   CMP is within normal limits aside from fasting glucose of 238  Total cholesterol is 124 with LDL of 61  Patient remains on Crestor 20 mg daily as directed  Patient denied symptoms of chest pain, palpitations, shortness of breath or dizziness  Chronic low back pain, unchanged  Patient is also complaining of new onset of left elbow discomfort within past few months  Patient was evaluated by Cardiology at [address] [place] [address] due left bundle branch block  He is currently following up with [facility]  Patient denies any recent recurrences of chest discomfort or palpitations  Patient remains on aspirin 81 mg daily  Review of Systems   Review of Systems   Constitutional: Negative  HENT: Negative  Eyes: Negative  Respiratory: Negative  Gastrointestinal: Negative  Endocrine: Positive for polydipsia  Negative for polyphagia and polyuria  Genitourinary: Negative      Musculoskeletal: Positive for arthralgias and back pain  Skin: Negative  Allergic/Immunologic: Negative  Neurological: Negative          Active Problem List     Patient Active Problem List   Diagnosis    Anemia    Arthralgia of knee, left    Benign enlargement of prostate    Benign essential hypertension    Complete left bundle branch block (LBBB)    Sick[facility] trait ([facility] )    Spinal stenosis    Mild obstructive sleep apnea    Hyperlipidemia    Diabetes mellitus type 2 in obese (HCC)    Medial epicondylitis of left elbow       Past Medical History:  Past Medical History:   Diagnosis Date    Anemia     mild anemia    Back pain     Heart burn     History of chest pain     Hypertension     Palpitations     Problems with hearing     Problems with swallowing     Sickle cell trait ([facility] )     last assessed 10/31/14    Snoring     SOB (shortness of breath)     Spinal stenosis     ddd spinal stenosis    Swelling        Past Surgical History:  Past Surgical History:   Procedure Laterality Date    BACK SURGERY      lower back    COLONOSCOPY  01/2016    due 2026, Jennifer Hernandez  12/07       Family History:  Family History   Problem Relation Age of Onset    Diabetes Father     Sickle cell anemia Family     Diabetes Sister     Cancer Brother        Social History:  Social History     Socioeconomic History    Marital status: /Civil Union     Spouse name: Not on file    Number of children: Not on file    Years of education: Not on file    Highest education level: Not on file   Occupational History    Not on file   Social Needs    Financial resource strain: Not on file    Food insecurity     Worry: Not on file     Inability: Not on file   [employer] needs     Medical: Not on file     Non-medical: Not on file   Tobacco Use    Smoking status: Never Smoker    Smokeless tobacco: Never Used   Substance and Sexual Activity    Alcohol use: No    Drug use: No    Sexual activity: Not on file   Lifestyle    Physical activity Days per week: Not on file     Minutes per session: Not on file    Stress: Not on file   Relationships    Social connections     Talks on phone: Not on file     Gets together: Not on file     Attends Church service: Not on file     Active member of club or organization: Not on file     Attends meetings of clubs or organizations: Not on file     Relationship status: Not on file    Intimate partner violence     Fear of current or ex partner: Not on file     Emotionally abused: Not on file     Physically abused: Not on file     Forced sexual activity: Not on file   Other Topics Concern    Not on file   Social History Narrative    Not on file           Objective     Vitals:    09/16/20 0759 09/16/20 0832   BP: 140/70 128/62   Pulse: 64    Resp: 16    Temp: 97 6 °F (36 4 °C)    TempSrc: Temporal    SpO2: 96%    Weight: 89 3 kg (196 lb 12 8 oz)    Height: 5' 6" (1 676 m)      Wt Readings from Last 3 Encounters:   09/16/20 89 3 kg (196 lb 12 8 oz)   06/15/20 90 4 kg (199 lb 6 4 oz)   02/10/20 89 7 kg (197 lb 12 8 oz)       Physical Exam  Vitals signs and nursing note reviewed  Constitutional:       Appearance: Normal appearance  He is well-developed  He is obese  He is not ill-appearing  HENT:      Head: Normocephalic and atraumatic  Eyes:      Conjunctiva/sclera: Conjunctivae normal    Neck:      Musculoskeletal: Normal range of motion and neck supple  Vascular: No carotid bruit  Cardiovascular:      Rate and Rhythm: Normal rate and regular rhythm  Pulses: no weak pulses          Dorsalis pedis pulses are 2+ on the right side and 2+ on the left side  Heart sounds: Normal heart sounds  No murmur  Pulmonary:      Effort: Pulmonary effort is normal  No respiratory distress  Breath sounds: Normal breath sounds  No wheezing or rales  Abdominal:      General: Bowel sounds are normal  There is no distension or abdominal bruit  Musculoskeletal: Normal range of motion        Left elbow: He exhibits normal range of motion, no swelling and no effusion  Tenderness found  Medial epicondyle tenderness noted  Right lower leg: No edema  Left lower leg: No edema  Feet:      Right foot:      Skin integrity: No ulcer, skin breakdown, erythema, warmth, callus or dry skin  Left foot:      Skin integrity: No ulcer, skin breakdown, erythema, warmth, callus or dry skin  Skin:     General: Skin is warm  Findings: No rash  Neurological:      General: No focal deficit present  Mental Status: He is alert and oriented to person, place, and time  Cranial Nerves: No cranial nerve deficit  Psychiatric:         Mood and Affect: Mood normal          Behavior: Behavior normal          Thought Content: Thought content normal        Patient's shoes and socks removed  Right Foot/Ankle   Right Foot Inspection  Skin Exam: skin normal and skin intact no dry skin, no warmth, no callus, no erythema, no maceration, no abnormal color, no pre-ulcer, no ulcer and no callus                          Toe Exam: ROM and strength within normal limits  Sensory   Vibration: intact  Proprioception: intact   Monofilament testing: intact  Vascular    The right DP pulse is 2+  Left Foot/Ankle  Left Foot Inspection  Skin Exam: skin normal and skin intactno dry skin, no warmth, no erythema, no maceration, normal color, no pre-ulcer, no ulcer and no callus                         Toe Exam: ROM and strength within normal limits                   Sensory   Vibration: intact  Proprioception: diminished  Monofilament: diminished  Vascular    The left DP pulse is 2+  Assign Risk Category:  No deformity present; No loss of protective sensation;  No weak pulses       Risk: 1      Pertinent Laboratory/Diagnostic Studies:  Lab Results   Component Value Date    BUN 13 09/11/2020    CREATININE 0 90 09/11/2020    CALCIUM 9 5 09/11/2020     10/27/2017    K 4 5 09/11/2020    CO2 30 09/11/2020     09/11/2020 Lab Results   Component Value Date    ALT 29 09/11/2020    AST 16 09/11/2020    ALKPHOS 78 09/11/2020    BILITOT 0 8 10/27/2017       Lab Results   Component Value Date    WBC 3 9 09/11/2020    HGB 12 7 (L) 09/11/2020    HCT 37 3 (L) 09/11/2020    MCV 94 2 09/11/2020     09/11/2020       Lab Results   Component Value Date    TSH 1 87 09/11/2020       Lab Results   Component Value Date    CHOL 113 10/27/2017     Lab Results   Component Value Date    TRIG 115 09/11/2020     Lab Results   Component Value Date    HDL 43 09/11/2020     Lab Results   Component Value Date    LDLCALC 61 09/11/2020     Lab Results   Component Value Date    HGBA1C 9 2 (H) 09/11/2020       Results for orders placed or performed in visit on 09/11/20   Lipid Panel with Direct LDL reflex   Result Value Ref Range    Total Cholesterol 124 <200 mg/dL    HDL 43 > OR = 40 mg/dL    Triglycerides 115 <150 mg/dL    LDL Calculated 61 mg/dL (calc)    Chol HDLC Ratio 2 9 <5 0 (calc)    Non-HDL Cholesterol 81 <130 mg/dL (calc)   Comprehensive metabolic panel   Result Value Ref Range    Glucose, Random 238 (H) 65 - 99 mg/dL    BUN 13 7 - 25 mg/dL    Creatinine 0 90 0 70 - 1 18 mg/dL    eGFR Non  85 > OR = 60 mL/min/1 73m2    eGFR  99 > OR = 60 mL/min/1 73m2    SL AMB BUN/CREATININE RATIO NOT APPLICABLE 6 - 22 (calc)    Sodium 140 135 - 146 mmol/L    Potassium 4 5 3 5 - 5 3 mmol/L    Chloride 104 98 - 110 mmol/L    CO2 30 20 - 32 mmol/L    Calcium 9 5 8 6 - 10 3 mg/dL    Protein, Total 7 1 6 1 - 8 1 g/dL    Albumin 4 6 3 6 - 5 1 g/dL    Globulin 2 5 1 9 - 3 7 g/dL (calc)    Albumin/Globulin Ratio 1 8 1 0 - 2 5 (calc)    TOTAL BILIRUBIN 1 2 0 2 - 1 2 mg/dL    Alkaline Phosphatase 78 35 - 144 U/L    AST 16 10 - 35 U/L    ALT 29 9 - 46 U/L   CBC and differential   Result Value Ref Range    White Blood Cell Count 3 9 3 8 - 10 8 Thousand/uL    Red Blood Cell Count 3 96 (L) 4 20 - 5 80 Million/uL    Hemoglobin 12 7 (L) 13 2 - 17 1 g/dL    HCT 37 3 (L) 38 5 - 50 0 %    MCV 94 2 80 0 - 100 0 fL    MCH 32 1 27 0 - 33 0 pg    MCHC 34 0 32 0 - 36 0 g/dL    RDW 12 9 11 0 - 15 0 %    Platelet Count 863 904 - 400 Thousand/uL    SL AMB MPV 11 5 7 5 - 12 5 fL    Neutrophils (Absolute) 2,079 1,500 - 7,800 cells/uL    Lymphocytes (Absolute) 1,427 850 - 3,900 cells/uL    Monocytes (Absolute) 351 200 - 950 cells/uL    Eosinophils (Absolute) 31 15 - 500 cells/uL    Basophils ABS 12 0 - 200 cells/uL    Neutrophils 53 3 %    Lymphocytes 36 6 %    Monocytes 9 0 %    Eosinophils 0 8 %    Basophils PCT 0 3 %   TSH, 3rd generation   Result Value Ref Range    TSH 1 87 0 40 - 4 50 mIU/L   PSA, Total Screen   Result Value Ref Range    Prostate Specific Antigen Total 1 3 < OR = 4 0 ng/mL   Hemoglobin A1c (w/out EAG)   Result Value Ref Range    Hemoglobin A1C 9 2 (H) <5 7 % of total Hgb       Orders Placed This Encounter   Procedures    influenza vaccine, high-dose, PF 0 7 mL (FLUZONE HIGH-DOSE)    CBC    Comprehensive metabolic panel    Hemoglobin A1C    Ambulatory referral to Diabetic Education    Ambulatory referral to clinical pharmacy       ALLERGIES:  Allergies   Allergen Reactions    Levofloxacin Rash     Other reaction(s): Unknown Allergic Reaction       Current Medications     Current Outpatient Medications   Medication Sig Dispense Refill    amLODIPine (NORVASC) 10 mg tablet Take 1 tablet (10 mg total) by mouth daily 90 tablet 3    aspirin 81 mg chewable tablet Chew 1 tablet daily      Blood Glucose Monitoring Suppl (ONE TOUCH ULTRA 2) w/Device KIT Use as directed  0    irbesartan (AVAPRO) 300 mg tablet Take 1 tablet (300 mg total) by mouth daily at bedtime 90 tablet 3    OneTouch Delica Lancets 97Q MISC Use twice a day 180 each 3    rosuvastatin (CRESTOR) 10 MG tablet Take 1 tablet (10 mg total) by mouth daily 90 tablet 3    glucose blood test strip Use twice a day, OneTouch Ultra strips 180 each 1    metFORMIN (GLUCOPHAGE-XR) 500 mg 24 hr tablet Take 1 tablet at dinner time 5 days then 2 tablets at dinner time for 5 days then take 3 tablets once a day with dinner 270 tablet 3     No current facility-administered medications for this visit          Medications Discontinued During This Encounter   Medication Reason    predniSONE 20 mg tablet     hydrochlorothiazide (HYDRODIURIL) 25 mg tablet     ONE TOUCH ULTRA TEST test strip     ONETOUCH DELICA LANCETS 49V MISC Reorder       Health Maintenance     Health Maintenance   Topic Date Due    Hepatitis C Screening  1950    Annual Physical  03/08/1966    DTaP,Tdap,and Td Vaccines (1 - Tdap) 03/08/1969    Diabetic Foot Exam  09/05/2020    BMI: Followup Plan  09/08/2020    Fall Risk  09/18/2020    Depression Screening PHQ  09/18/2020    HEMOGLOBIN A1C  03/11/2021    BMI: Adult  09/16/2021    DM Eye Exam  10/25/2021    Colorectal Cancer Screening  01/05/2026    Influenza Vaccine  Completed    Pneumococcal Vaccine: 65+ Years  Completed    HIB Vaccine  Aged Out    Hepatitis B Vaccine  Aged Out    IPV Vaccine  Aged Out    Hepatitis A Vaccine  Aged Out    Meningococcal ACWY Vaccine  Aged Out    HPV Vaccine  Aged Out       Immunization History   Administered Date(s) Administered    INFLUENZA 09/20/2018    Influenza Split High Dose Preservative Free IM 10/14/2016, 10/27/2017    Influenza TIV (IM) 10/12/2007, 09/25/2009, 01/17/2014, 10/22/2014    Influenza, high dose seasonal 0 7 mL 09/20/2018, 09/10/2019, 09/16/2020    Pneumococcal Conjugate 13-Valent 10/14/2016    Pneumococcal Polysaccharide PPV23 04/26/2013       Apple Avelar MD

## 2020-09-21 PROBLEM — M77.02 MEDIAL EPICONDYLITIS OF LEFT ELBOW: Status: ACTIVE | Noted: 2020-09-21

## 2020-09-21 PROBLEM — E11.69 DIABETES MELLITUS TYPE 2 IN OBESE (HCC): Chronic | Status: ACTIVE | Noted: 2020-09-16

## 2020-09-21 PROBLEM — L24.9 IRRITANT CONTACT DERMATITIS: Status: RESOLVED | Noted: 2020-06-15 | Resolved: 2020-09-21

## 2020-09-21 PROBLEM — M54.10 RADICULOPATHY AFFECTING UPPER EXTREMITY: Status: RESOLVED | Noted: 2017-05-06 | Resolved: 2020-09-21

## 2020-09-21 PROBLEM — E66.9 DIABETES MELLITUS TYPE 2 IN OBESE (HCC): Chronic | Status: ACTIVE | Noted: 2020-09-16

## 2020-09-21 NOTE — ASSESSMENT & PLAN NOTE
· Chronic low back pain, stable left lower extremity radiculopathy, numbness and tingling  · Continue home stretching PT  · History of low back surgery in 2012

## 2020-09-21 NOTE — ASSESSMENT & PLAN NOTE
· Likely due to overuse  · Patient will start over-the-counter 1% Voltaren gel  · I advised him to wear elbow support brace

## 2020-09-21 NOTE — ASSESSMENT & PLAN NOTE
· Recent blood work revealed stable hemoglobin of 12 7    · Patient was evaluated by Hematology in the past

## 2020-09-21 NOTE — ASSESSMENT & PLAN NOTE
· Patient denies symptoms of chest pain, dyspnea or palpitations  · Continue regimen for hypertension and hyperlipidemia:  Patient remains on aspirin    · Patient is following up with [facility]

## 2020-09-21 NOTE — ASSESSMENT & PLAN NOTE
Lab Results   Component Value Date    HGBA1C 9 2 (H) 09/11/2020   · New onset of type 2 diabetes  · Start metformin 500 mg daily x5 days then 1000 mg daily x5 days then increase dose to 1500 mg daily with dinner  · Referral for diabetic nutrition classes  · Referral to clinical pharmacy for close follow-up  · Start Accu-Cheks every morning and occasionally at bedtime  · Importance of exercise and weight loss as well as adherence to low carbohydrate diet emphasized  · Patient is due for diabetic eye exam  · Diabetic foot exam performed today  · Patient will repeat blood work in 3 months

## 2020-09-24 ENCOUNTER — TELEPHONE (OUTPATIENT)
Dept: FAMILY MEDICINE CLINIC | Facility: CLINIC | Age: 70
End: 2020-09-24

## 2020-09-24 ENCOUNTER — DOCUMENTATION (OUTPATIENT)
Dept: FAMILY MEDICINE CLINIC | Facility: CLINIC | Age: 70
End: 2020-09-24

## 2020-09-24 DIAGNOSIS — E66.9 DIABETES MELLITUS TYPE 2 IN OBESE (HCC): Primary | Chronic | ICD-10-CM

## 2020-09-24 DIAGNOSIS — E11.69 DIABETES MELLITUS TYPE 2 IN OBESE (HCC): Primary | Chronic | ICD-10-CM

## 2020-09-24 RX ORDER — EMPAGLIFLOZIN 10 MG/1
10 TABLET, FILM COATED ORAL EVERY MORNING
Qty: 30 TABLET | Refills: 3 | Status: SHIPPED | OUTPATIENT
Start: 2020-09-24 | End: 2020-10-01 | Stop reason: SINTOL

## 2020-09-24 NOTE — TELEPHONE ENCOUNTER
Please contact patient  I will switch his prescription for Jardiance 10 mg daily  I would appreciate close follow-up regarding side effects and blood sugars    I would recommend to repeat BMP in 10-14 days after start of Jardiance to monitor GFR      Thank you

## 2020-09-24 NOTE — TELEPHONE ENCOUNTER
Spoke with patient:    · Stop  Metformin  · Start Jardiance, 1 tablet once daily, either morning or night, with or without food  · Continue SMBG  · Obtain labs in 10-14 days    Will check in with patient in 1 week to obtain SMBG and remind about labs and check for AE with Jardiance    Provided my contact information for any questions or cost concerns  If remains on medication will want sent to [facility] mail order

## 2020-09-24 NOTE — PROGRESS NOTES
[address] [facility]  Called patient to follow-up on new medication start, metformin, in light of recent A1C;     Patient's insurance coverage:  Payor: [employer] [place] / Plan: [facility] HMO / Product Type: Blue HMO /        Findings:     Patient reports he only took one dose of 500mg on Tuesday, then experienced excruciating stomach pain all day on Wednesday and Thursday  Has not since resumed medication and does not wish to do so  He has scheduled with DM ed to become more educated on how to control his diet    He is checking his blood sugar but was driving when I called him and unable to provide me any numbers       Hemoglobin A1C (% of total Hgb)   Date Value   09/11/2020 9 2 (H)   10/02/2019 5 8 (H)   10/23/2018 6 7 (H)     eGFR (ml/min/1 73sq m)   Date Value   09/11/2018 72     Creatinine (mg/dL)   Date Value   09/11/2020 0 90   09/11/2018 1 05   10/27/2017 0 83       Current Outpatient Medications:     amLODIPine (NORVASC) 10 mg tablet, Take 1 tablet (10 mg total) by mouth daily, Disp: 90 tablet, Rfl: 3    aspirin 81 mg chewable tablet, Chew 1 tablet daily, Disp: , Rfl:     Blood Glucose Monitoring Suppl (ONE TOUCH ULTRA 2) w/Device KIT, Use as directed, Disp: , Rfl: 0    glucose blood test strip, Use twice a day, OneTouch Ultra strips, Disp: 180 each, Rfl: 1    irbesartan (AVAPRO) 300 mg tablet, Take 1 tablet (300 mg total) by mouth daily at bedtime, Disp: 90 tablet, Rfl: 3    metFORMIN (GLUCOPHAGE-XR) 500 mg 24 hr tablet, Take 1 tablet at dinner time 5 days then 2 tablets at dinner time for 5 days then take 3 tablets once a day with dinner, Disp: 270 tablet, Rfl: 3    OneTouch Delica Lancets 42R MISC, Use twice a day, Disp: 180 each, Rfl: 3    rosuvastatin (CRESTOR) 10 MG tablet, Take 1 tablet (10 mg total) by mouth daily, Disp: 90 tablet, Rfl: 3           Recommendations:   · Stop metformin  · Start [id number] once daily x 4 weeks, assess for glycemic control and if necessary increase to 25mg daily  · Will follow-up with recommendations and check in with patient for SMBG and AEs weekly    Demographics  Interaction Method: Phone  Type of Intervention: New    Topic(s) Addressed  Diabetes    Intervention(s) Made    Pharmacologic:  Medication Initiation    Medication Discontinuation    Prevent or Manage Adverse Drug Reaction    Non-Pharmacologic:  Adherence Addressed    Tool(s) Used  Not Applicable    Time Spent:   Time Spent in Direct Patient Care: 25 minutes    Time Spent in Care Coordination: 5 minutes    Recommendation(s) Accepted by the Patient/Caregiver:  All Accepted

## 2020-10-01 ENCOUNTER — CLINICAL SUPPORT (OUTPATIENT)
Dept: FAMILY MEDICINE CLINIC | Facility: CLINIC | Age: 70
End: 2020-10-01

## 2020-10-01 DIAGNOSIS — E11.69 DIABETES MELLITUS TYPE 2 IN OBESE (HCC): Primary | Chronic | ICD-10-CM

## 2020-10-01 DIAGNOSIS — E66.9 DIABETES MELLITUS TYPE 2 IN OBESE (HCC): Primary | Chronic | ICD-10-CM

## 2020-10-05 ENCOUNTER — OFFICE VISIT (OUTPATIENT)
Dept: DIABETES SERVICES | Facility: CLINIC | Age: 70
End: 2020-10-05
Payer: COMMERCIAL

## 2020-10-05 DIAGNOSIS — E66.9 TYPE 2 DIABETES MELLITUS WITH OBESITY (HCC): Primary | ICD-10-CM

## 2020-10-05 DIAGNOSIS — E11.69 TYPE 2 DIABETES MELLITUS WITH OBESITY (HCC): Primary | ICD-10-CM

## 2020-10-05 PROCEDURE — G0108 DIAB MANAGE TRN  PER INDIV: HCPCS | Performed by: DIETITIAN, REGISTERED

## 2020-10-21 DIAGNOSIS — E11.69 DIABETES MELLITUS TYPE 2 IN OBESE (HCC): ICD-10-CM

## 2020-10-21 DIAGNOSIS — E66.9 DIABETES MELLITUS TYPE 2 IN OBESE (HCC): ICD-10-CM

## 2020-10-21 RX ORDER — LANCETS 33 GAUGE
EACH MISCELLANEOUS
Qty: 180 EACH | Refills: 3 | Status: SHIPPED | OUTPATIENT
Start: 2020-10-21 | End: 2021-09-09

## 2020-10-22 ENCOUNTER — CLINICAL SUPPORT (OUTPATIENT)
Dept: FAMILY MEDICINE CLINIC | Facility: CLINIC | Age: 70
End: 2020-10-22

## 2020-10-22 DIAGNOSIS — E11.69 DIABETES MELLITUS TYPE 2 IN OBESE (HCC): Primary | Chronic | ICD-10-CM

## 2020-10-22 DIAGNOSIS — E66.9 DIABETES MELLITUS TYPE 2 IN OBESE (HCC): Primary | Chronic | ICD-10-CM

## 2020-10-25 ENCOUNTER — TELEPHONE (OUTPATIENT)
Dept: FAMILY MEDICINE CLINIC | Facility: CLINIC | Age: 70
End: 2020-10-25

## 2020-10-28 ENCOUNTER — OFFICE VISIT (OUTPATIENT)
Dept: FAMILY MEDICINE CLINIC | Facility: CLINIC | Age: 70
End: 2020-10-28
Payer: COMMERCIAL

## 2020-10-28 VITALS
OXYGEN SATURATION: 95 % | TEMPERATURE: 98.2 F | BODY MASS INDEX: 30.29 KG/M2 | HEIGHT: 66 IN | RESPIRATION RATE: 16 BRPM | WEIGHT: 188.5 LBS | HEART RATE: 65 BPM | SYSTOLIC BLOOD PRESSURE: 130 MMHG | DIASTOLIC BLOOD PRESSURE: 68 MMHG

## 2020-10-28 DIAGNOSIS — K85.10 ACUTE BILIARY PANCREATITIS, UNSPECIFIED COMPLICATION STATUS: Primary | ICD-10-CM

## 2020-10-28 DIAGNOSIS — H11.32 SUBCONJUNCTIVAL HEMATOMA, LEFT: ICD-10-CM

## 2020-10-28 DIAGNOSIS — I10 BENIGN ESSENTIAL HYPERTENSION: ICD-10-CM

## 2020-10-28 DIAGNOSIS — E11.69 DIABETES MELLITUS TYPE 2 IN OBESE (HCC): Chronic | ICD-10-CM

## 2020-10-28 DIAGNOSIS — E78.5 HYPERLIPIDEMIA, UNSPECIFIED HYPERLIPIDEMIA TYPE: ICD-10-CM

## 2020-10-28 DIAGNOSIS — E66.9 DIABETES MELLITUS TYPE 2 IN OBESE (HCC): Chronic | ICD-10-CM

## 2020-10-28 PROCEDURE — 1101F PT FALLS ASSESS-DOCD LE1/YR: CPT | Performed by: FAMILY MEDICINE

## 2020-10-28 PROCEDURE — 3725F SCREEN DEPRESSION PERFORMED: CPT | Performed by: FAMILY MEDICINE

## 2020-10-28 PROCEDURE — 3078F DIAST BP <80 MM HG: CPT | Performed by: FAMILY MEDICINE

## 2020-10-28 PROCEDURE — 3288F FALL RISK ASSESSMENT DOCD: CPT | Performed by: FAMILY MEDICINE

## 2020-10-28 PROCEDURE — 1160F RVW MEDS BY RX/DR IN RCRD: CPT | Performed by: FAMILY MEDICINE

## 2020-10-28 PROCEDURE — 99215 OFFICE O/P EST HI 40 MIN: CPT | Performed by: FAMILY MEDICINE

## 2020-10-28 PROCEDURE — 3008F BODY MASS INDEX DOCD: CPT | Performed by: FAMILY MEDICINE

## 2020-10-28 PROCEDURE — 3075F SYST BP GE 130 - 139MM HG: CPT | Performed by: FAMILY MEDICINE

## 2020-10-28 RX ORDER — LOSARTAN POTASSIUM 100 MG/1
100 TABLET ORAL DAILY
COMMUNITY
Start: 2020-10-27

## 2020-10-31 LAB
ALBUMIN SERPL-MCNC: 4.5 G/DL (ref 3.6–5.1)
ALBUMIN/GLOB SERPL: 2 (CALC) (ref 1–2.5)
ALP SERPL-CCNC: 64 U/L (ref 35–144)
ALT SERPL-CCNC: 34 U/L (ref 9–46)
AST SERPL-CCNC: 17 U/L (ref 10–35)
BASOPHILS # BLD AUTO: 19 CELLS/UL (ref 0–200)
BASOPHILS NFR BLD AUTO: 0.6 %
BILIRUB SERPL-MCNC: 0.9 MG/DL (ref 0.2–1.2)
BUN SERPL-MCNC: 13 MG/DL (ref 7–25)
BUN/CREAT SERPL: ABNORMAL (CALC) (ref 6–22)
CALCIUM SERPL-MCNC: 9.1 MG/DL (ref 8.6–10.3)
CHLORIDE SERPL-SCNC: 106 MMOL/L (ref 98–110)
CO2 SERPL-SCNC: 31 MMOL/L (ref 20–32)
CREAT SERPL-MCNC: 0.78 MG/DL (ref 0.7–1.18)
EOSINOPHIL # BLD AUTO: 19 CELLS/UL (ref 15–500)
EOSINOPHIL NFR BLD AUTO: 0.6 %
ERYTHROCYTE [DISTWIDTH] IN BLOOD BY AUTOMATED COUNT: 13.1 % (ref 11–15)
GLOBULIN SER CALC-MCNC: 2.2 G/DL (CALC) (ref 1.9–3.7)
GLUCOSE SERPL-MCNC: 121 MG/DL (ref 65–99)
HCT VFR BLD AUTO: 37.1 % (ref 38.5–50)
HGB BLD-MCNC: 12.4 G/DL (ref 13.2–17.1)
LIPASE SERPL-CCNC: 147 U/L (ref 7–60)
LYMPHOCYTES # BLD AUTO: 1165 CELLS/UL (ref 850–3900)
LYMPHOCYTES NFR BLD AUTO: 36.4 %
MCH RBC QN AUTO: 31.2 PG (ref 27–33)
MCHC RBC AUTO-ENTMCNC: 33.4 G/DL (ref 32–36)
MCV RBC AUTO: 93.5 FL (ref 80–100)
MONOCYTES # BLD AUTO: 310 CELLS/UL (ref 200–950)
MONOCYTES NFR BLD AUTO: 9.7 %
NEUTROPHILS # BLD AUTO: 1686 CELLS/UL (ref 1500–7800)
NEUTROPHILS NFR BLD AUTO: 52.7 %
PLATELET # BLD AUTO: 173 THOUSAND/UL (ref 140–400)
PMV BLD REES-ECKER: 11.2 FL (ref 7.5–12.5)
POTASSIUM SERPL-SCNC: 4 MMOL/L (ref 3.5–5.3)
PROT SERPL-MCNC: 6.7 G/DL (ref 6.1–8.1)
RBC # BLD AUTO: 3.97 MILLION/UL (ref 4.2–5.8)
SL AMB EGFR AFRICAN AMERICAN: 105 ML/MIN/1.73M2
SL AMB EGFR NON AFRICAN AMERICAN: 90 ML/MIN/1.73M2
SODIUM SERPL-SCNC: 142 MMOL/L (ref 135–146)
WBC # BLD AUTO: 3.2 THOUSAND/UL (ref 3.8–10.8)

## 2020-11-04 ENCOUNTER — TELEPHONE (OUTPATIENT)
Dept: FAMILY MEDICINE CLINIC | Facility: CLINIC | Age: 70
End: 2020-11-04

## 2020-12-21 DIAGNOSIS — I10 BENIGN ESSENTIAL HYPERTENSION: ICD-10-CM

## 2020-12-21 RX ORDER — AMLODIPINE BESYLATE 10 MG/1
TABLET ORAL
Qty: 90 TABLET | Refills: 3 | Status: SHIPPED | OUTPATIENT
Start: 2020-12-21

## 2020-12-24 LAB
ALBUMIN SERPL-MCNC: 4.7 G/DL (ref 3.6–5.1)
ALBUMIN/GLOB SERPL: 1.9 (CALC) (ref 1–2.5)
ALP SERPL-CCNC: 76 U/L (ref 35–144)
ALT SERPL-CCNC: 29 U/L (ref 9–46)
AST SERPL-CCNC: 18 U/L (ref 10–35)
BASOPHILS # BLD AUTO: 21 CELLS/UL (ref 0–200)
BASOPHILS NFR BLD AUTO: 0.5 %
BILIRUB SERPL-MCNC: 0.8 MG/DL (ref 0.2–1.2)
BUN SERPL-MCNC: 12 MG/DL (ref 7–25)
BUN/CREAT SERPL: ABNORMAL (CALC) (ref 6–22)
CALCIUM SERPL-MCNC: 9.6 MG/DL (ref 8.6–10.3)
CHLORIDE SERPL-SCNC: 105 MMOL/L (ref 98–110)
CO2 SERPL-SCNC: 31 MMOL/L (ref 20–32)
CREAT SERPL-MCNC: 0.84 MG/DL (ref 0.7–1.18)
EOSINOPHIL # BLD AUTO: 49 CELLS/UL (ref 15–500)
EOSINOPHIL NFR BLD AUTO: 1.2 %
ERYTHROCYTE [DISTWIDTH] IN BLOOD BY AUTOMATED COUNT: 13.5 % (ref 11–15)
GLOBULIN SER CALC-MCNC: 2.5 G/DL (CALC) (ref 1.9–3.7)
GLUCOSE SERPL-MCNC: 151 MG/DL (ref 65–99)
HBA1C MFR BLD: 5.8 % OF TOTAL HGB
HCT VFR BLD AUTO: 40.3 % (ref 38.5–50)
HGB BLD-MCNC: 13.7 G/DL (ref 13.2–17.1)
LYMPHOCYTES # BLD AUTO: 1628 CELLS/UL (ref 850–3900)
LYMPHOCYTES NFR BLD AUTO: 39.7 %
MCH RBC QN AUTO: 32.2 PG (ref 27–33)
MCHC RBC AUTO-ENTMCNC: 34 G/DL (ref 32–36)
MCV RBC AUTO: 94.8 FL (ref 80–100)
MONOCYTES # BLD AUTO: 385 CELLS/UL (ref 200–950)
MONOCYTES NFR BLD AUTO: 9.4 %
NEUTROPHILS # BLD AUTO: 2017 CELLS/UL (ref 1500–7800)
NEUTROPHILS NFR BLD AUTO: 49.2 %
PLATELET # BLD AUTO: 159 THOUSAND/UL (ref 140–400)
PMV BLD REES-ECKER: 11.1 FL (ref 7.5–12.5)
POTASSIUM SERPL-SCNC: 4.4 MMOL/L (ref 3.5–5.3)
PROT SERPL-MCNC: 7.2 G/DL (ref 6.1–8.1)
RBC # BLD AUTO: 4.25 MILLION/UL (ref 4.2–5.8)
SL AMB EGFR AFRICAN AMERICAN: 101 ML/MIN/1.73M2
SL AMB EGFR NON AFRICAN AMERICAN: 87 ML/MIN/1.73M2
SODIUM SERPL-SCNC: 142 MMOL/L (ref 135–146)
WBC # BLD AUTO: 4.1 THOUSAND/UL (ref 3.8–10.8)

## 2020-12-30 ENCOUNTER — TELEPHONE (OUTPATIENT)
Dept: FAMILY MEDICINE CLINIC | Facility: CLINIC | Age: 70
End: 2020-12-30

## 2020-12-30 DIAGNOSIS — E66.9 DIABETES MELLITUS TYPE 2 IN OBESE (HCC): Primary | Chronic | ICD-10-CM

## 2020-12-30 DIAGNOSIS — I10 BENIGN ESSENTIAL HYPERTENSION: ICD-10-CM

## 2020-12-30 DIAGNOSIS — E11.69 DIABETES MELLITUS TYPE 2 IN OBESE (HCC): Primary | Chronic | ICD-10-CM

## 2021-01-13 DIAGNOSIS — E11.69 DIABETES MELLITUS TYPE 2 IN OBESE (HCC): ICD-10-CM

## 2021-01-13 DIAGNOSIS — E66.9 DIABETES MELLITUS TYPE 2 IN OBESE (HCC): ICD-10-CM

## 2021-01-14 RX ORDER — BLOOD SUGAR DIAGNOSTIC
STRIP MISCELLANEOUS
Qty: 100 EACH | Refills: 3 | Status: SHIPPED | OUTPATIENT
Start: 2021-01-14 | End: 2021-09-09

## 2021-01-23 ENCOUNTER — PATIENT MESSAGE (OUTPATIENT)
Dept: FAMILY MEDICINE CLINIC | Facility: CLINIC | Age: 71
End: 2021-01-23

## 2021-01-25 NOTE — TELEPHONE ENCOUNTER
From: Yvrose Roth  To: Kenyetta Rubio MD  Sent: 1/23/2021 10:34 PM EST  Subject: Non-Urgent Medical Question    Wm Iniguez  I would like to know when can I take Covid 19 vaccine and where ? Is it possible to register me as well as my wife to take the vaccine as soon as possible given my medical conditions and my age   In addition, as you know I belong to the category of education     Please advise   Thank you and have a nice day

## 2021-02-13 DIAGNOSIS — Z23 ENCOUNTER FOR IMMUNIZATION: ICD-10-CM

## 2021-02-21 ENCOUNTER — IMMUNIZATIONS (OUTPATIENT)
Dept: FAMILY MEDICINE CLINIC | Facility: HOSPITAL | Age: 71
End: 2021-02-21

## 2021-02-21 DIAGNOSIS — Z23 ENCOUNTER FOR IMMUNIZATION: Primary | ICD-10-CM

## 2021-02-21 PROCEDURE — 91300 SARS-COV-2 / COVID-19 MRNA VACCINE (PFIZER-BIONTECH) 30 MCG: CPT

## 2021-02-21 PROCEDURE — 0001A SARS-COV-2 / COVID-19 MRNA VACCINE (PFIZER-BIONTECH) 30 MCG: CPT

## 2021-03-13 ENCOUNTER — IMMUNIZATIONS (OUTPATIENT)
Dept: FAMILY MEDICINE CLINIC | Facility: HOSPITAL | Age: 71
End: 2021-03-13

## 2021-03-13 DIAGNOSIS — Z23 ENCOUNTER FOR IMMUNIZATION: Primary | ICD-10-CM

## 2021-03-13 PROCEDURE — 91300 SARS-COV-2 / COVID-19 MRNA VACCINE (PFIZER-BIONTECH) 30 MCG: CPT

## 2021-03-13 PROCEDURE — 0002A SARS-COV-2 / COVID-19 MRNA VACCINE (PFIZER-BIONTECH) 30 MCG: CPT

## 2021-06-18 ENCOUNTER — OFFICE VISIT (OUTPATIENT)
Dept: FAMILY MEDICINE CLINIC | Facility: CLINIC | Age: 71
End: 2021-06-18
Payer: COMMERCIAL

## 2021-06-18 ENCOUNTER — PATIENT MESSAGE (OUTPATIENT)
Dept: FAMILY MEDICINE CLINIC | Facility: CLINIC | Age: 71
End: 2021-06-18

## 2021-06-18 ENCOUNTER — HOSPITAL ENCOUNTER (OUTPATIENT)
Dept: RADIOLOGY | Facility: HOSPITAL | Age: 71
Discharge: HOME | End: 2021-06-18
Payer: COMMERCIAL

## 2021-06-18 VITALS
RESPIRATION RATE: 16 BRPM | TEMPERATURE: 97.8 F | DIASTOLIC BLOOD PRESSURE: 66 MMHG | HEIGHT: 66 IN | HEART RATE: 58 BPM | WEIGHT: 192.2 LBS | SYSTOLIC BLOOD PRESSURE: 132 MMHG | OXYGEN SATURATION: 98 % | BODY MASS INDEX: 30.89 KG/M2

## 2021-06-18 DIAGNOSIS — M25.512 ACUTE PAIN OF BOTH SHOULDERS: ICD-10-CM

## 2021-06-18 DIAGNOSIS — M54.2 ACUTE NECK PAIN: ICD-10-CM

## 2021-06-18 DIAGNOSIS — M25.511 ACUTE PAIN OF BOTH SHOULDERS: ICD-10-CM
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
## 2021-06-18 DIAGNOSIS — M54.50 ACUTE MIDLINE LOW BACK PAIN WITHOUT SCIATICA: ICD-10-CM

## 2021-06-18 DIAGNOSIS — M25.611 POST-TRAUMATIC STIFF SHOULDER, RIGHT: ICD-10-CM

## 2021-06-18 LAB
ALBUMIN SERPL-MCNC: 4.5 G/DL (ref 3.6–5.1)
ALBUMIN/GLOB SERPL: 1.9 (CALC) (ref 1–2.5)
ALP SERPL-CCNC: 72 U/L (ref 35–144)
ALT SERPL-CCNC: 25 U/L (ref 9–46)
AST SERPL-CCNC: 18 U/L (ref 10–35)
BASOPHILS # BLD AUTO: 8 CELLS/UL (ref 0–200)
BASOPHILS NFR BLD AUTO: 0.2 %
BILIRUB SERPL-MCNC: 1 MG/DL (ref 0.2–1.2)
BUN SERPL-MCNC: 16 MG/DL (ref 7–25)
BUN/CREAT SERPL: ABNORMAL (CALC) (ref 6–22)
CALCIUM SERPL-MCNC: 9.2 MG/DL (ref 8.6–10.3)
CHLORIDE SERPL-SCNC: 106 MMOL/L (ref 98–110)
CHOLEST SERPL-MCNC: 122 MG/DL
CHOLEST/HDLC SERPL: 2.5 (CALC)
CO2 SERPL-SCNC: 27 MMOL/L (ref 20–32)
CREAT SERPL-MCNC: 0.81 MG/DL (ref 0.7–1.18)
EOSINOPHIL # BLD AUTO: 48 CELLS/UL (ref 15–500)
EOSINOPHIL NFR BLD AUTO: 1.2 %
ERYTHROCYTE [DISTWIDTH] IN BLOOD BY AUTOMATED COUNT: 13.5 % (ref 11–15)
GLOBULIN SER CALC-MCNC: 2.4 G/DL (CALC) (ref 1.9–3.7)
GLUCOSE SERPL-MCNC: 116 MG/DL (ref 65–99)
HBA1C MFR BLD: 5.8 % OF TOTAL HGB
HCT VFR BLD AUTO: 38.6 % (ref 38.5–50)
HDLC SERPL-MCNC: 49 MG/DL
HGB BLD-MCNC: 13.2 G/DL (ref 13.2–17.1)
LDLC SERPL CALC-MCNC: 58 MG/DL (CALC)
LYMPHOCYTES # BLD AUTO: 1652 CELLS/UL (ref 850–3900)
LYMPHOCYTES NFR BLD AUTO: 41.3 %
MCH RBC QN AUTO: 32.5 PG (ref 27–33)
MCHC RBC AUTO-ENTMCNC: 34.2 G/DL (ref 32–36)
MCV RBC AUTO: 95.1 FL (ref 80–100)
MONOCYTES # BLD AUTO: 436 CELLS/UL (ref 200–950)
MONOCYTES NFR BLD AUTO: 10.9 %
NEUTROPHILS # BLD AUTO: 1856 CELLS/UL (ref 1500–7800)
NEUTROPHILS NFR BLD AUTO: 46.4 %
NONHDLC SERPL-MCNC: 73 MG/DL (CALC)
PLATELET # BLD AUTO: 141 THOUSAND/UL (ref 140–400)
PMV BLD REES-ECKER: 10.5 FL (ref 7.5–12.5)
POTASSIUM SERPL-SCNC: 4.3 MMOL/L (ref 3.5–5.3)
PROT SERPL-MCNC: 6.9 G/DL (ref 6.1–8.1)
RBC # BLD AUTO: 4.06 MILLION/UL (ref 4.2–5.8)
SL AMB EGFR AFRICAN AMERICAN: 102 ML/MIN/1.73M2
SL AMB EGFR NON AFRICAN AMERICAN: 88 ML/MIN/1.73M2
SODIUM SERPL-SCNC: 140 MMOL/L (ref 135–146)
TRIGL SERPL-MCNC: 72 MG/DL
TSH SERPL-ACNC: 2.12 MIU/L (ref 0.4–4.5)
WBC # BLD AUTO: 4 THOUSAND/UL (ref 3.8–10.8)

## 2021-06-18 PROCEDURE — 72050 X-RAY EXAM NECK SPINE 4/5VWS: CPT

## 2021-06-18 PROCEDURE — 99214 OFFICE O/P EST MOD 30 MIN: CPT | Performed by: FAMILY MEDICINE

## 2021-06-18 PROCEDURE — 72110 X-RAY EXAM L-2 SPINE 4/>VWS: CPT

## 2021-06-18 PROCEDURE — 73050 X-RAY EXAM OF SHOULDERS: CPT

## 2021-06-18 PROCEDURE — 73030 X-RAY EXAM OF SHOULDER: CPT

## 2021-06-18 RX ORDER — NABUMETONE 750 MG/1
TABLET, FILM COATED ORAL
Qty: 60 TABLET | Refills: 0 | Status: SHIPPED | OUTPATIENT
Start: 2021-06-18 | End: 2021-07-11

## 2021-06-18 RX ORDER — TRAMADOL HYDROCHLORIDE 50 MG/1
TABLET ORAL
Qty: 30 TABLET | Refills: 0 | Status: SHIPPED | OUTPATIENT
Start: 2021-06-18 | End: 2021-11-18
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
## 2021-06-21 ENCOUNTER — TELEPHONE (OUTPATIENT)
Dept: FAMILY MEDICINE CLINIC | Facility: CLINIC | Age: 71
End: 2021-06-21

## 2021-06-21 NOTE — TELEPHONE ENCOUNTER
----- Message from Melva Schaumann, MD sent at 6/21/2021  1:08 PM EDT -----  I have reviewed all lab results which are normal or stable  Please inform the patient     Thank you

## 2021-06-24 ENCOUNTER — OFFICE VISIT (OUTPATIENT)
Dept: OBGYN CLINIC | Facility: OTHER | Age: 71
End: 2021-06-24
Payer: COMMERCIAL

## 2021-06-24 VITALS
WEIGHT: 193 LBS | DIASTOLIC BLOOD PRESSURE: 71 MMHG | BODY MASS INDEX: 31.02 KG/M2 | HEIGHT: 66 IN | SYSTOLIC BLOOD PRESSURE: 132 MMHG | HEART RATE: 57 BPM

## 2021-06-24 DIAGNOSIS — M75.02 ADHESIVE CAPSULITIS OF BOTH SHOULDERS: Primary | ICD-10-CM

## 2021-06-24 DIAGNOSIS — M25.512 ACUTE PAIN OF BOTH SHOULDERS: ICD-10-CM

## 2021-06-24 DIAGNOSIS — M75.01 ADHESIVE CAPSULITIS OF BOTH SHOULDERS: Primary | ICD-10-CM

## 2021-06-24 DIAGNOSIS — M25.511 ACUTE PAIN OF BOTH SHOULDERS: ICD-10-CM

## 2021-06-24 PROCEDURE — 20610 DRAIN/INJ JOINT/BURSA W/O US: CPT | Performed by: ORTHOPAEDIC SURGERY

## 2021-06-24 PROCEDURE — 99213 OFFICE O/P EST LOW 20 MIN: CPT | Performed by: ORTHOPAEDIC SURGERY

## 2021-06-24 RX ORDER — BETAMETHASONE SODIUM PHOSPHATE AND BETAMETHASONE ACETATE 3; 3 MG/ML; MG/ML
6 INJECTION, SUSPENSION INTRA-ARTICULAR; INTRALESIONAL; INTRAMUSCULAR; SOFT TISSUE
Status: COMPLETED | OUTPATIENT
Start: 2021-06-24 | End: 2021-06-24

## 2021-06-24 RX ORDER — BUPIVACAINE HYDROCHLORIDE 2.5 MG/ML
2 INJECTION, SOLUTION INFILTRATION; PERINEURAL
Status: COMPLETED | OUTPATIENT
Start: 2021-06-24 | End: 2021-06-24

## 2021-06-24 RX ADMIN — BETAMETHASONE SODIUM PHOSPHATE AND BETAMETHASONE ACETATE 6 MG: 3; 3 INJECTION, SUSPENSION INTRA-ARTICULAR; INTRALESIONAL; INTRAMUSCULAR; SOFT TISSUE at 10:12

## 2021-06-24 RX ADMIN — BUPIVACAINE HYDROCHLORIDE 2 ML: 2.5 INJECTION, SOLUTION INFILTRATION; PERINEURAL at 10:12
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
## 2021-06-24 NOTE — PATIENT INSTRUCTIONS

## 2021-06-25 ENCOUNTER — VBI (OUTPATIENT)
Dept: ADMINISTRATIVE | Facility: OTHER | Age: 71
End: 2021-06-25

## 2021-06-28 ENCOUNTER — EVALUATION (OUTPATIENT)
Dept: PHYSICAL THERAPY | Facility: REHABILITATION | Age: 71
End: 2021-06-28
Payer: COMMERCIAL

## 2021-06-28 ENCOUNTER — TELEPHONE (OUTPATIENT)
Dept: FAMILY MEDICINE CLINIC | Facility: CLINIC | Age: 71
End: 2021-06-28

## 2021-06-28 DIAGNOSIS — M25.512 ACUTE PAIN OF BOTH SHOULDERS: ICD-10-CM

## 2021-06-28 DIAGNOSIS — M75.02 ADHESIVE CAPSULITIS OF BOTH SHOULDERS: ICD-10-CM

## 2021-06-28 DIAGNOSIS — M75.01 ADHESIVE CAPSULITIS OF BOTH SHOULDERS: ICD-10-CM

## 2021-06-28 DIAGNOSIS — M25.511 ACUTE PAIN OF BOTH SHOULDERS: ICD-10-CM

## 2021-06-28 PROCEDURE — 97162 PT EVAL MOD COMPLEX 30 MIN: CPT | Performed by: PHYSICAL THERAPIST

## 2021-06-28 PROCEDURE — 97140 MANUAL THERAPY 1/> REGIONS: CPT | Performed by: PHYSICAL THERAPIST

## 2021-06-28 PROCEDURE — 97112 NEUROMUSCULAR REEDUCATION: CPT | Performed by: PHYSICAL THERAPIST
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
## 2021-07-01 ENCOUNTER — OFFICE VISIT (OUTPATIENT)
Dept: PHYSICAL THERAPY | Facility: REHABILITATION | Age: 71
End: 2021-07-01
Payer: COMMERCIAL

## 2021-07-01 DIAGNOSIS — M75.02 ADHESIVE CAPSULITIS OF BOTH SHOULDERS: Primary | ICD-10-CM

## 2021-07-01 DIAGNOSIS — M25.512 ACUTE PAIN OF BOTH SHOULDERS: ICD-10-CM

## 2021-07-01 DIAGNOSIS — M25.511 ACUTE PAIN OF BOTH SHOULDERS: ICD-10-CM

## 2021-07-01 DIAGNOSIS — M75.01 ADHESIVE CAPSULITIS OF BOTH SHOULDERS: Primary | ICD-10-CM

## 2021-07-01 PROCEDURE — 97140 MANUAL THERAPY 1/> REGIONS: CPT | Performed by: PHYSICAL THERAPIST

## 2021-07-01 PROCEDURE — 97112 NEUROMUSCULAR REEDUCATION: CPT | Performed by: PHYSICAL THERAPIST

## 2021-07-01 NOTE — PROGRESS NOTES
Daily Note     Today's date: 2021  Patient name: Reyes Obey  : 1950  MRN: [id number]  Referring provider: Barbara Javier MD  Dx:   Encounter Diagnosis     ICD-10-CM    1  Post traumatic adhesive capsulitis of both shoulders  M75 01     M75 02    2  Acute pain of both shoulders  M25 511     M25 512        Start Time:   Stop Time:   Total time in clinic (min): 40 minutes    Subjective: Patient reports less pain today since   Reports HEP compliance with reduction in pain      Objective: See treatment diary below  Flexion AROM: 80 deg R - 90 deg post manuals  + elevated rib 1  + TTP rib 1  + TTP and hypomobile rib 2  + median nerve ANTT    Assessment: Tolerated treatment well  Patient reports less pain post session, still limited with arm movement wot ~ 90 deg elevation  Will continue with HEP, will progress as tolerated  Plan: Continue per plan of care        Precautions: HTN, DM II      Manuals            Prone CTJ mob G5            Prone TS PA Mid TS PA supine - G5 mid TS           CS central gliders - median nerve  3 bouts x 20           Supine CS retraction + ext 3x5            R rib 2 PA mob  G5           Neuro Re-Ed             Education 5'            Seated CS retraction + ext 3x5 3x5           Table slides flexion 15x            Seated TS ext  2x10                                                  Ther Ex             Supine cane flexion  15x                                                                                                      Ther Activity                                       Gait Training                                       Modalities

## 2021-07-08 ENCOUNTER — OFFICE VISIT (OUTPATIENT)
Dept: PHYSICAL THERAPY | Facility: REHABILITATION | Age: 71
End: 2021-07-08
Payer: COMMERCIAL

## 2021-07-08 DIAGNOSIS — M75.02 ADHESIVE CAPSULITIS OF BOTH SHOULDERS: Primary | ICD-10-CM

## 2021-07-08 DIAGNOSIS — M75.01 ADHESIVE CAPSULITIS OF BOTH SHOULDERS: Primary | ICD-10-CM

## 2021-07-08 DIAGNOSIS — M25.511 ACUTE PAIN OF BOTH SHOULDERS: ICD-10-CM

## 2021-07-08 DIAGNOSIS — M25.512 ACUTE PAIN OF BOTH SHOULDERS: ICD-10-CM

## 2021-07-08 PROCEDURE — 97140 MANUAL THERAPY 1/> REGIONS: CPT | Performed by: PHYSICAL THERAPIST

## 2021-07-08 PROCEDURE — 97112 NEUROMUSCULAR REEDUCATION: CPT | Performed by: PHYSICAL THERAPIST

## 2021-07-08 NOTE — PROGRESS NOTES
Daily Note     Today's date: 2021  Patient name: Tommy Jeronimo  : 1950  MRN: [id number]  Referring provider: Rubina Ceja MD  Dx:   Encounter Diagnosis     ICD-10-CM    1  Post traumatic adhesive capsulitis of both shoulders  M75 01     M75 02    2  Acute pain of both shoulders  M25 511     M25 512        Start Time: 1215  Stop Time: 1300  Total time in clinic (min): 45 minutes    Subjective: Patient reports that he continues to feel less pain but R shoulder flexion and movement actively is most limiting      Objective: See treatment diary below  CS R SB (repeateD) - improved range, should elevation improved to within 90% of full  CS cleared prior to G5 mobilization (arterial screen and ligamentous)    Assessment: Tolerated treatment well  Patient with improved CS segmental mobility post mobilization, no adverse response  Updated HEP with retracton and SB to L as well as wall sliders for shoulder AROM  Plan: Continue per plan of care        Precautions: HTN, DM II      Manuals           Prone CTJ mob G5            Prone TS PA Mid TS PA supine - G5 mid TS           G5 C5-6 rot mob to R   G5          CS retraction with R SB   10x          CS central gliders - median nerve  3 bouts x 20           Supine CS retraction + ext 3x5            R rib 2 PA mob  G5           Neuro Re-Ed             Education 5'  5'          Seated CS retraction + ext 3x5 3x5 2x10          Table slides flexion 15x            Seated TS ext  2x10           Wall slides   2x10          CS retraction + L SB   3x5          LT lifts - tran   5"x10          Ther Ex             Supine cane flexion  15x                                                                                                      Ther Activity                                       Gait Training                                       Modalities

## 2021-07-11 DIAGNOSIS — M25.512 ACUTE PAIN OF BOTH SHOULDERS: ICD-10-CM

## 2021-07-11 DIAGNOSIS — M25.511 ACUTE PAIN OF BOTH SHOULDERS: ICD-10-CM

## 2021-07-11 RX ORDER — NABUMETONE 750 MG/1
TABLET, FILM COATED ORAL
Qty: 60 TABLET | Refills: 3 | Status: SHIPPED | OUTPATIENT
Start: 2021-07-11 | End: 2021-11-18 | Stop reason: SDUPTHER

## 2021-07-12 ENCOUNTER — OFFICE VISIT (OUTPATIENT)
Dept: PHYSICAL THERAPY | Facility: REHABILITATION | Age: 71
End: 2021-07-12
Payer: COMMERCIAL

## 2021-07-12 DIAGNOSIS — M25.512 ACUTE PAIN OF BOTH SHOULDERS: ICD-10-CM

## 2021-07-12 DIAGNOSIS — M75.01 ADHESIVE CAPSULITIS OF BOTH SHOULDERS: Primary | ICD-10-CM

## 2021-07-12 DIAGNOSIS — M75.02 ADHESIVE CAPSULITIS OF BOTH SHOULDERS: Primary | ICD-10-CM

## 2021-07-12 DIAGNOSIS — M25.511 ACUTE PAIN OF BOTH SHOULDERS: ICD-10-CM

## 2021-07-12 PROCEDURE — 97112 NEUROMUSCULAR REEDUCATION: CPT

## 2021-07-12 NOTE — PROGRESS NOTES
Daily Note     Today's date: 2021  Patient name: Chante Emery  : 1950  MRN: [id number]  Referring provider: Kathleen Coronado MD  Dx:   Encounter Diagnosis     ICD-10-CM    1  Post traumatic adhesive capsulitis of both shoulders  M75 01     M75 02    2  Acute pain of both shoulders  M25 511     M25 512                   Subjective: Pt reports he feels his motion is improving  He has trouble sleeping when he tries to sleep on his side and wakes up throughout the night with some pain  Objective: See treatment diary below   Pt arrived 10 mins late for treatment and had time constraints today  He was accommodated appropriately  Assessment: Tolerated treatment well  Pt required cuing for proper form with cervical retraction  No increased pain reported with interventions today  Improving c/s mobility  Pt demonstrated fatigue post treatment and would benefit from continued therapy  Plan: Continue per plan of care        Precautions: HTN, DM II      Manuals          Prone CTJ mob G5            Prone TS PA Mid TS PA supine - G5 mid TS           G5 C5-6 rot mob to R   G5 G4 3 rds  MM         CS retraction with R SB   10x 10x         CS central gliders - median nerve  3 bouts x 20           Supine CS retraction + ext 3x5            R rib 2 PA mob  G5           Neuro Re-Ed             Education 5'  5'          Seated CS retraction + ext 3x5 3x5 2x10 2x10         Table slides flexion 15x            Seated TS ext  2x10           Wall slides   2x10 2x10         CS retraction + L SB   3x5 3x5         LT lifts - tran   5"x10 5"   2x10         Ther Ex           Supine cane flexion  15x  15x                                                                                                    Ther Activity                                       Gait Training                                       Modalities

## 2021-07-15 ENCOUNTER — OFFICE VISIT (OUTPATIENT)
Dept: PHYSICAL THERAPY | Facility: REHABILITATION | Age: 71
End: 2021-07-15
Payer: COMMERCIAL

## 2021-07-15 DIAGNOSIS — M75.01 ADHESIVE CAPSULITIS OF BOTH SHOULDERS: Primary | ICD-10-CM

## 2021-07-15 DIAGNOSIS — M75.02 ADHESIVE CAPSULITIS OF BOTH SHOULDERS: Primary | ICD-10-CM

## 2021-07-15 DIAGNOSIS — M25.511 ACUTE PAIN OF BOTH SHOULDERS: ICD-10-CM

## 2021-07-15 DIAGNOSIS — M25.512 ACUTE PAIN OF BOTH SHOULDERS: ICD-10-CM

## 2021-07-15 PROCEDURE — 97140 MANUAL THERAPY 1/> REGIONS: CPT | Performed by: PHYSICAL THERAPIST

## 2021-07-15 PROCEDURE — 97112 NEUROMUSCULAR REEDUCATION: CPT | Performed by: PHYSICAL THERAPIST

## 2021-07-15 NOTE — PROGRESS NOTES
Daily Note     Today's date: 7/15/2021  Patient name: Ruby Jenkins  : 1950  MRN: [id number]  Referring provider: Maty Garcia MD  Dx:   Encounter Diagnosis     ICD-10-CM    1  Post traumatic adhesive capsulitis of both shoulders  M75 01     M75 02    2  Acute pain of both shoulders  M25 511     M25 512        Start Time: 3470  Stop Time: 5694  Total time in clinic (min): 45 minutes    Subjective: Pt reports 70% improvement since starting PT  States that his pain and function with raising his arms have been better  Objective: See treatment diary below  ANTT all negative B/L  TTP L sub scap  Pain and limited L IR (active)    Assessment: Tolerated treatment well  Patient with improved pain with active IR post STM and re-ed  Updated HEP with standing active IR against resistance  Educated on continued repeated L SB and repated retraciton/ext  Plan: Continue per plan of care        Precautions: HTN, DM II      Manuals 6/28 7/1 7/8 7/12 7/15        Prone CTJ mob G5            assessment     5'        STM R sub scap     5'        Prone TS PA Mid TS PA supine - G5 mid TS           G5 C5-6 rot mob to R   G5 G4 3 rds  MM         CS retraction with R SB   10x 10x         CS central gliders - median nerve  3 bouts x 20           Supine CS retraction + ext 3x5            R rib 2 PA mob  G5           Neuro Re-Ed     7/15        Education 5'  5'          Seated CS retraction + ext 3x5 3x5 2x10 2x10 2x10        Table slides flexion 15x            Seated TS ext  2x10           Standing IR     GTB  2x10        Supine IR     OTB  2x10        Wall slides   2x10 2x10 With touchdown - 2x8        CS retraction + L SB   3x5 3x5 3x5        LT lifts - tran   5"x10 5"   2x10         Ther Ex           Supine cane flexion  15x  15x                                                                                                    Ther Activity                                       Gait Training Modalities

## 2021-07-19 ENCOUNTER — APPOINTMENT (OUTPATIENT)
Dept: PHYSICAL THERAPY | Facility: REHABILITATION | Age: 71
End: 2021-07-19
Payer: COMMERCIAL

## 2021-07-21 ENCOUNTER — OFFICE VISIT (OUTPATIENT)
Dept: PHYSICAL THERAPY | Facility: REHABILITATION | Age: 71
End: 2021-07-21
Payer: COMMERCIAL

## 2021-07-21 DIAGNOSIS — M75.01 ADHESIVE CAPSULITIS OF BOTH SHOULDERS: Primary | ICD-10-CM

## 2021-07-21 DIAGNOSIS — M25.511 ACUTE PAIN OF BOTH SHOULDERS: ICD-10-CM

## 2021-07-21 DIAGNOSIS — M75.02 ADHESIVE CAPSULITIS OF BOTH SHOULDERS: Primary | ICD-10-CM

## 2021-07-21 DIAGNOSIS — M25.512 ACUTE PAIN OF BOTH SHOULDERS: ICD-10-CM

## 2021-07-21 PROCEDURE — 97110 THERAPEUTIC EXERCISES: CPT | Performed by: PHYSICAL THERAPIST

## 2021-07-21 PROCEDURE — 97112 NEUROMUSCULAR REEDUCATION: CPT | Performed by: PHYSICAL THERAPIST

## 2021-07-21 NOTE — PROGRESS NOTES
Daily Note     Today's date: 2021  Patient name: Jakub Goodson  : 1950  MRN: [id number]  Referring provider: Juliana Hamilton  Dx:   Encounter Diagnosis     ICD-10-CM    1  Post traumatic adhesive capsulitis of both shoulders  M75 01     M75 02    2  Acute pain of both shoulders  M25 511     M25 512        Start Time: 1055  Stop Time: 1135  Total time in clinic (min): 40 minutes    Subjective: Patient reports that he continues to note improvement in pain, only time he is getting pain is when he sleeps on his right side and occasionally with overhead lifting  Objective: See treatment diary below  CS AROM pfree  Shoulder AROM Pfree    Assessment: Tolerated treatment well  Patient reports less shoulder pain along posterior aspect after session  Tone increases were noted alogn post RC with TTP  Educated on how to perform self STM for HEP      Plan: Continue per plan of care        Precautions: HTN, DM II      Manuals 6/28 7/1 7/8 7/12 7/15 7/21       Prone CTJ mob G5            assessment     5' 5'       STM R pec minor and post cuff     5' 10'       Prone TS PA Mid TS PA supine - G5 mid TS           G5 C5-6 rot mob to R   G5 G4 3 rds  MM         CS retraction with R SB   10x 10x         CS central gliders - median nerve  3 bouts x 20           Supine CS retraction + ext 3x5            R rib 2 PA mob  G5           Neuro Re-Ed     7/15 7/21       Education 5'  5'   3'       Seated CS retraction + ext 3x5 3x5 2x10 2x10 2x10 10x       SL ER      OTB  3x8       Self STM post cuff      2'       Table slides flexion 15x            Seated TS ext  2x10           Standing IR     GTB  2x10        Supine IR     OTB  2x10        Wall slides   2x10 2x10 With touchdown - 2x8 20x       CS retraction + L SB   3x5 3x5 3x5 10x       LT lifts - tran   5"x10 5"   2x10         Ther Ex         Supine cane flexion  15x  15x         UBE      8' Ther Activity                                       Gait Training                                       Modalities

## 2021-07-26 ENCOUNTER — OFFICE VISIT (OUTPATIENT)
Dept: PHYSICAL THERAPY | Facility: REHABILITATION | Age: 71
End: 2021-07-26
Payer: COMMERCIAL

## 2021-07-26 DIAGNOSIS — M75.01 ADHESIVE CAPSULITIS OF BOTH SHOULDERS: Primary | ICD-10-CM

## 2021-07-26 DIAGNOSIS — M25.512 ACUTE PAIN OF BOTH SHOULDERS: ICD-10-CM

## 2021-07-26 DIAGNOSIS — M75.02 ADHESIVE CAPSULITIS OF BOTH SHOULDERS: Primary | ICD-10-CM

## 2021-07-26 DIAGNOSIS — M25.511 ACUTE PAIN OF BOTH SHOULDERS: ICD-10-CM

## 2021-07-26 PROCEDURE — 97110 THERAPEUTIC EXERCISES: CPT | Performed by: PHYSICAL THERAPIST

## 2021-07-26 PROCEDURE — 97140 MANUAL THERAPY 1/> REGIONS: CPT | Performed by: PHYSICAL THERAPIST

## 2021-07-26 PROCEDURE — 97112 NEUROMUSCULAR REEDUCATION: CPT | Performed by: PHYSICAL THERAPIST

## 2021-07-26 NOTE — PROGRESS NOTES
Daily Note     Today's date: 2021  Patient name: Samreen Davila  : 1950  MRN: [id number]  Referring provider: Gretchen Mcdonnell  Dx:   Encounter Diagnosis     ICD-10-CM    1  Post traumatic adhesive capsulitis of both shoulders  M75 01     M75 02    2  Acute pain of both shoulders  M25 511     M25 512        Start Time: 1445  Stop Time: 1540  Total time in clinic (min): 55 minutes    Subjective: In regards to progress, patient reports feeling "almost" back to normal, still getting pain at night when lying on RUE      Objective: See treatment diary below      Assessment: Tolerated treatment well  Patient did have less shoulder pain with supine flexion/scaption following eccentric lowering  AGMR noted with R shoulder, tolerated ER / IR in supine well but cuing was provided for maintaingin control  Likely DC in next 2 visits  Plan: Continue per plan of care        Precautions: HTN, DM II      Manuals 6/28 7/1 7/8 7/12 7/15 7/21 7/26      Prone CTJ mob G5            assessment     5' 5' 5'      STM R teres mj and sub scap     5' 10' 10'      Prone TS PA Mid TS PA supine - G5 mid TS           G5 C5-6 rot mob to R   G5 G4 3 rds  MM         CS retraction with R SB   10x 10x         CS central gliders - median nerve  3 bouts x 20           Supine CS retraction + ext 3x5            R rib 2 PA mob  G5           Neuro Re-Ed     7/15 7/21 7/26      scap plane ecc lower       3# - 10x  5# 2x10 - standing  3# 3x10 supne      Education 5'  5'   3'       Seated CS retraction + ext 3x5 3x5 2x10 2x10 2x10 10x       SL ER      OTB  3x8       Self STM post cuff      2'       Table slides flexion 15x            Seated TS ext  2x10           Standing IR     GTB  2x10        Supine IR / ER     OTB  2x10  GTB  20x ea       Wall slides   2x10 2x10 With touchdown - 2x8 20x       CS retraction + L SB   3x5 3x5 3x5 10x       LT lifts - tran   5"x10 5"   2x10         Ther Ex        Supine cane flexion  15x  15x         UBE      8' 8'                                                                                    Ther Activity                                       Gait Training                                       Modalities

## 2021-08-06 ENCOUNTER — OFFICE VISIT (OUTPATIENT)
Dept: PHYSICAL THERAPY | Facility: REHABILITATION | Age: 71
End: 2021-08-06
Payer: COMMERCIAL

## 2021-08-06 DIAGNOSIS — M25.512 ACUTE PAIN OF BOTH SHOULDERS: ICD-10-CM

## 2021-08-06 DIAGNOSIS — M25.511 ACUTE PAIN OF BOTH SHOULDERS: ICD-10-CM

## 2021-08-06 DIAGNOSIS — M75.01 ADHESIVE CAPSULITIS OF BOTH SHOULDERS: Primary | ICD-10-CM

## 2021-08-06 DIAGNOSIS — M75.02 ADHESIVE CAPSULITIS OF BOTH SHOULDERS: Primary | ICD-10-CM

## 2021-08-06 PROCEDURE — 97140 MANUAL THERAPY 1/> REGIONS: CPT | Performed by: PHYSICAL THERAPIST

## 2021-08-06 PROCEDURE — 97112 NEUROMUSCULAR REEDUCATION: CPT | Performed by: PHYSICAL THERAPIST

## 2021-08-06 NOTE — PROGRESS NOTES
Daily Note     Today's date: 2021  Patient name: Jeny Urena  : 1950  MRN: [id number]  Referring provider: Jessica Junior  Dx:   Encounter Diagnosis     ICD-10-CM    1  Post traumatic adhesive capsulitis of both shoulders  M75 01     M75 02    2  Acute pain of both shoulders  M25 511     M25 512        Start Time: 1130  Stop Time: 1215  Total time in clinic (min): 45 minutes    Subjective: Really only time patient reports pain is at night (when lying on R or L side), pain is more constant  Objective: See treatment diary below  Hypomobile rib 1  ER/IR MMT 4+/5  Neg median and radial nerve ANTT  FOTO - improved      Assessment: Tolerated treatment well  Patient did report less shoulder pain post session  Was able to decrease pain with SB or ipsilateral side today, I am having the patient perform retraciton with R SB for HEP follow by retraction and extension in CS  Educated to perform right before bed to see if pain changes  Plan: Continue per plan of care        Precautions: HTN, DM II      Manuals 6/28 7/1 7/8 7/12 7/15 7/21 7/26 8/6     Prone CTJ mob G5            assessment     5' 5' 5' 5'     STM R teres mj and sub scap     5' 10' 10'      Prone TS PA Mid TS PA supine - G5 mid TS           G5 C5-6 rot mob to R   G5 G4 3 rds  MM         CS retraction with R SB   10x 10x    3x5     CS central gliders - median nerve  3 bouts x 20           Rib 1 inf glide        supine G4     Supine CS retraction + ext 3x5            R rib 2 PA mob  G5      Prone G4     Neuro Re-Ed     7/15 7/21 7/26 8/6     scap plane ecc lower       3# - 10x  5# 2x10 - standing  3# 3x10 supne      Education 5'  5'   3'       Seated CS retraction + ext 3x5 3x5 2x10 2x10 2x10 10x  2x5     SL ER      OTB  3x8       Self STM post cuff      2'       Table slides flexion 15x            Seated TS ext  2x10           Standing IR     GTB  2x10        Supine IR / ER     OTB  2x10  GTB  20x ea       Wall slides   2x10 2x10 With touchdown - 2x8 20x       CS retraction + L SB   3x5 3x5 3x5 10x  R SB today  2x5     LT lifts - tran   5"x10 5"   2x10         Ther Ex  7/1 7/12 7/21 7/26      Supine cane flexion  15x  15x         UBE      8' 8'                                                                                    Ther Activity                                       Gait Training                                       Modalities

## 2021-08-11 ENCOUNTER — OFFICE VISIT (OUTPATIENT)
Dept: PHYSICAL THERAPY | Facility: REHABILITATION | Age: 71
End: 2021-08-11
Payer: COMMERCIAL

## 2021-08-11 DIAGNOSIS — M75.01 ADHESIVE CAPSULITIS OF BOTH SHOULDERS: Primary | ICD-10-CM

## 2021-08-11 DIAGNOSIS — M25.511 ACUTE PAIN OF BOTH SHOULDERS: ICD-10-CM

## 2021-08-11 DIAGNOSIS — M25.512 ACUTE PAIN OF BOTH SHOULDERS: ICD-10-CM

## 2021-08-11 DIAGNOSIS — M75.02 ADHESIVE CAPSULITIS OF BOTH SHOULDERS: Primary | ICD-10-CM

## 2021-08-11 PROCEDURE — 97112 NEUROMUSCULAR REEDUCATION: CPT | Performed by: PHYSICAL THERAPIST

## 2021-08-11 PROCEDURE — 97140 MANUAL THERAPY 1/> REGIONS: CPT | Performed by: PHYSICAL THERAPIST

## 2021-08-11 NOTE — PROGRESS NOTES
Daily Note     Today's date: 2021  Patient name: Ronnie Jansen  : 1950  MRN: [id number]  Referring provider: Shanell Hendrix  Dx:   Encounter Diagnosis     ICD-10-CM    1  Post traumatic adhesive capsulitis of both shoulders  M75 01     M75 02    2  Acute pain of both shoulders  M25 511     M25 512        Start Time: 1000  Stop Time: 1045  Total time in clinic (min): 45 minutes    Subjective: Patient reports 80% improvement at this time, only time he really feels pain is when lying on R side at night  Objective: See treatment diary below    CS AROM: WFL - pfree  Shoulder AROM: WNL, pain only with active IR  ER MMT 4/5 R, 4+/5 L  scaption MMT 4/5 B/L  Negative ANTT median,radial, ulnar nerve  1  Patient will be Independent with hep - MET  2  Patient will improve pain with activity by 50% - MET  3  Patient will improve shoulder PROM by 50% - MET  4  Patient will improve shoulder flexion AROM to 90 deg flexion, ER to back of head - MET      Long Term Goals:   1  Patient will improve FOTO to greater then goal - MET  2  Patient will improve pain with activity to 2/10 or less - IMPROVED  3  Patient will continue with HEP independence to allow for decreased future reoccurrence of pain and loss in function - MET  4  Patient will Improve PROM to WNL, pfree - MET  5  Patient will reach 140 deg overhead to allow for return to function/ADLs/work tasks - MET  6  Patient will carry 20+ lbs pfree - MET    Assessment: Patient is a 66y o  year old male who attended physical therapy for 9 treatment sessions regarding B/L shoulder pain and neck pain  Patient reports 80% improvement at this time which correlates to improved impairments and functionality  Patient has shown improvement throughout PT by demonstrating decreased pain, increased range of motion, increased strength and improved tolerance to activity  Will DC PT at this time  Goals met, educated on DA        Plan: DC today  Precautions: HTN, DM II      Manuals 6/28 7/1 7/8 7/12 7/15 7/21 7/26 8/6 8/11    Prone CTJ mob G5            assessment     5' 5' 5' 5' 15'    STM R teres mj and sub scap     5' 10' 10'  supraspinatus/UT-5'    Prone TS PA Mid TS PA supine - G5 mid TS           G5 C5-6 rot mob to R   G5 G4 3 rds  MM         CS retraction with R SB   10x 10x    3x5     CS central gliders - median nerve  3 bouts x 20           Rib 1 inf glide        supine G4     Supine CS retraction + ext 3x5            R rib 2 PA mob  G5      Prone G4     Neuro Re-Ed     7/15 7/21 7/26 8/6 8/11    scap plane ecc lower       3# - 10x  5# 2x10 - standing  3# 3x10 supne      Education 5'  5'   3'       Seated CS retraction + ext 3x5 3x5 2x10 2x10 2x10 10x  2x5 2x5    Self STM R Ut/supraspinatus - w/ and w/o CS AROM         5'    SL ER      OTB  3x8       Self STM post cuff      2'       Table slides flexion 15x            Seated TS ext  2x10           Standing IR     GTB  2x10        Supine IR / ER     OTB  2x10  GTB  20x ea       Wall slides   2x10 2x10 With touchdown - 2x8 20x       CS retraction + L SB   3x5 3x5 3x5 10x  R SB today  2x5 R SB 2x5    LT lifts - tran   5"x10 5"   2x10         Ther Ex  7/1 7/12 7/21 7/26      Supine cane flexion  15x  15x         UBE      8' 8'                                                                                    Ther Activity                                       Gait Training                                       Modalities

## 2021-08-24 ENCOUNTER — OFFICE VISIT (OUTPATIENT)
Dept: FAMILY MEDICINE CLINIC | Facility: CLINIC | Age: 71
End: 2021-08-24
Payer: COMMERCIAL

## 2021-08-24 VITALS
RESPIRATION RATE: 16 BRPM | DIASTOLIC BLOOD PRESSURE: 60 MMHG | TEMPERATURE: 97.6 F | OXYGEN SATURATION: 97 % | HEIGHT: 66 IN | WEIGHT: 190.5 LBS | SYSTOLIC BLOOD PRESSURE: 130 MMHG | BODY MASS INDEX: 30.62 KG/M2 | HEART RATE: 60 BPM

## 2021-08-24 DIAGNOSIS — H91.93 BILATERAL HEARING LOSS, UNSPECIFIED HEARING LOSS TYPE: ICD-10-CM

## 2021-08-24 DIAGNOSIS — M75.02 ADHESIVE CAPSULITIS OF BOTH SHOULDERS: Primary | ICD-10-CM

## 2021-08-24 DIAGNOSIS — M75.01 ADHESIVE CAPSULITIS OF BOTH SHOULDERS: Primary | ICD-10-CM

## 2021-08-24 PROCEDURE — 99213 OFFICE O/P EST LOW 20 MIN: CPT | Performed by: NURSE PRACTITIONER
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
## 2021-08-24 NOTE — ASSESSMENT & PLAN NOTE
Pt continues with muscular pain/aching following completion of PT  He is taking ibuprofen and tramadol as needed but does not feel they help  Pt advised to stop the tramadol if he does not feel it helps  Exam consistent with muscular tension and spasm  Discussed starting a muscle relaxer, but pt states he has had poor tolerance to this in the past and does not want this  Recommended trying lidocaine patch and topical heat with either icy/hot or heating bad  He is schedule for f/u with ortho next month  1 or 2

## 2021-09-03 ENCOUNTER — OFFICE VISIT (OUTPATIENT)
Dept: FAMILY MEDICINE CLINIC | Facility: CLINIC | Age: 71
End: 2021-09-03
Payer: COMMERCIAL

## 2021-09-03 VITALS
SYSTOLIC BLOOD PRESSURE: 130 MMHG | HEART RATE: 59 BPM | OXYGEN SATURATION: 98 % | TEMPERATURE: 98.8 F | HEIGHT: 66 IN | DIASTOLIC BLOOD PRESSURE: 70 MMHG | RESPIRATION RATE: 18 BRPM | WEIGHT: 189.13 LBS | BODY MASS INDEX: 30.4 KG/M2

## 2021-09-03 DIAGNOSIS — E66.9 DIABETES MELLITUS TYPE 2 IN OBESE (HCC): Chronic | ICD-10-CM

## 2021-09-03 DIAGNOSIS — Z00.00 ENCOUNTER FOR WELLNESS EXAMINATION IN ADULT: Primary | ICD-10-CM

## 2021-09-03 DIAGNOSIS — I10 BENIGN ESSENTIAL HYPERTENSION: ICD-10-CM

## 2021-09-03 DIAGNOSIS — H91.93 BILATERAL HEARING LOSS, UNSPECIFIED HEARING LOSS TYPE: ICD-10-CM

## 2021-09-03 DIAGNOSIS — M75.02 ADHESIVE CAPSULITIS OF BOTH SHOULDERS: ICD-10-CM

## 2021-09-03 DIAGNOSIS — Z12.5 PROSTATE CANCER SCREENING: ICD-10-CM

## 2021-09-03 DIAGNOSIS — D57.3 SICKLE CELL TRAIT (HCC): ICD-10-CM

## 2021-09-03 DIAGNOSIS — I44.7 COMPLETE LEFT BUNDLE BRANCH BLOCK (LBBB): ICD-10-CM

## 2021-09-03 DIAGNOSIS — M75.01 ADHESIVE CAPSULITIS OF BOTH SHOULDERS: ICD-10-CM

## 2021-09-03 DIAGNOSIS — E11.69 DIABETES MELLITUS TYPE 2 IN OBESE (HCC): Chronic | ICD-10-CM

## 2021-09-03 DIAGNOSIS — E78.5 HYPERLIPIDEMIA, UNSPECIFIED HYPERLIPIDEMIA TYPE: ICD-10-CM

## 2021-09-03 PROCEDURE — 1160F RVW MEDS BY RX/DR IN RCRD: CPT | Performed by: FAMILY MEDICINE

## 2021-09-03 PROCEDURE — 99397 PER PM REEVAL EST PAT 65+ YR: CPT | Performed by: FAMILY MEDICINE

## 2021-09-03 PROCEDURE — 1036F TOBACCO NON-USER: CPT | Performed by: FAMILY MEDICINE

## 2021-09-03 NOTE — PROGRESS NOTES
FAMILY PRACTICE OFFICE VISIT       NAME: Marlon Lechuga  AGE: 66 y o  SEX: male       : 1950        MRN: [id number]        Assessment and Plan     1  Encounter for wellness examination in adult    2  Diabetes mellitus type 2 in [facility])  Assessment & Plan:    Lab Results   Component Value Date    HGBA1C 5 8 (H) 2021   Diet controlled  Patient will repeat blood work in December  Orders:  -     Hemoglobin A1C; Future; Expected date: 2021  -     Hemoglobin A1C    3  Benign essential hypertension  Assessment & Plan:   Blood pressure is well controlled  Continue amlodipine and losartan  4  Sickle cell trait ([facility] [address] )    5  Hyperlipidemia, unspecified hyperlipidemia type  Assessment & Plan:   Continue rosuvastatin 10 mg daily  Monitor labs    Orders:  -     CBC and differential; Future; Expected date: 2021  -     Comprehensive metabolic panel; Future; Expected date: 2021  -     Lipid Panel with Direct LDL reflex; Future; Expected date: 2021  -     TSH, 3rd generation; Future; Expected date: 2021  -     CBC and differential  -     Comprehensive metabolic panel  -     Lipid Panel with Direct LDL reflex  -     TSH, 3rd generation    6  Prostate cancer screening  -     PSA, Total Screen; Future; Expected date: 2021    7  Bilateral hearing loss, unspecified hearing loss type  Assessment & Plan:  Bilateral hearing loss reported by patient  No tinnitus on steadiness  Referral to ENT    Orders:  -     Ambulatory Referral to Otolaryngology; Future    8  Post traumatic adhesive capsulitis of both shoulders  Assessment & Plan:    Patient reports overall significant improvement in range of motion  He is still experiencing persistent pain, right more than left  I advised patient to schedule follow-up with [facility] surgery, possibility of shoulder MRI was mention in previous Ortho notes      9   Complete left bundle branch block (LBBB)  Assessment & Plan:   Patient denies symptoms of angina, dyspnea or palpitations  He remains under care of Cardiology at [address] [place] [address]  Patient presents for annual well exam   Assessment and plan as outlined above  Patient will be due for routine blood work in early December  I advised him to schedule flu vaccine  He is interested in shingles vaccination I will inquire regarding coverage  Patient will proceed with Shingrix at either PCP office or at the local pharmacy  I advised patient to schedule follow-up evaluation with [facility] surgery due to persistent right shoulder pain  He remains under ongoing care of Cardiology  Referral to ENT for evaluation of hearing loss  BMI Counseling: Body mass index is 30 53 kg/m²  The BMI is above normal  Nutrition recommendations include encouraging healthy choices of fruits and vegetables, consuming healthier snacks, moderation in carbohydrate intake and reducing intake of cholesterol  Exercise recommendations include exercising 3-5 times per week  No pharmacotherapy was ordered  Patient referred to PCP due to patient being overweight  There are no Patient Instructions on file for this visit  No follow-ups on file  Discussed with the patient and all questioned fully answered  He will call me if any problems arise  M*Modal software was used to dictate this note  It may contain errors with dictating incorrect words/spelling  Please contact provider directly with any questions  Chief Complaint     Chief Complaint   Patient presents with    Well Check    dm foot exam     sock and shoes remove        History of Present Illness      Patient presents for annual well exam   He has been feeling generally well  Patient experienced mild symptoms of cold few days ago, resolved, he no fever chest tightness  He is completely vaccinated against COVID-19    Patient completed physical therapy for chronic shoulder and neck pain  He was diagnosed with posttraumatic adhesive capsulitis  Patient overall reports significant improvement in range of motion  His right shoulder is still more bothersome  Patient has difficulty sleeping on the right side and driving with his right arm  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  Pain from the right upper back and shoulder radiate down to the right elbow  Patient denies symptoms of numbness and tingling  Patient is right handed  Type 2 diabetes:  Currently diet controlled  Most recent hemoglobin A1c was performed in June and was 5 8  Patient continues with strict low-carbohydrate diet  Hypertension  Hyperlipidemia  Left bundle branch block  Patient denies symptoms of chest pain, palpitations, shortness of breath or dizziness  He remains under care of Cardiology at [facility]  Daily medications include losartan, aspirin and Crestor  History of pancreatitis: Patient denies any recurrences of abdominal pain  Health maintenance:  He is up-to-date with colonoscopy, 2016  PSA would be drawn in December along with routine blood work  Patient completed COVID-19 vaccination  He is up-to-date with pneumococcal vaccines  We discussed shingles vaccination  Patient will check coverage and will proceed at either our office or local pharmacy  Patient complains of decreased hearing and would like to pursue further evaluation  No tinnitus           Review of Systems   Review of Systems   Constitutional: Negative  HENT: Positive for hearing loss  Negative for tinnitus  Eyes: Negative  Respiratory: Negative  Cardiovascular: Negative  Gastrointestinal: Negative  Endocrine: Negative  Genitourinary: Negative  Musculoskeletal: Positive for arthralgias  As per HPI   Skin: Negative  Allergic/Immunologic: Negative  Neurological: Negative  Hematological: Negative  Psychiatric/Behavioral: Negative          Active Problem List     Patient Active Problem List   Diagnosis    Anemia    Arthralgia of knee, left    Benign enlargement of prostate    Benign essential hypertension    Complete left bundle branch block (LBBB)    Sickle cell trait ([facility] [address] )    Spinal stenosis    Mild obstructive sleep apnea    Hyperlipidemia    Diabetes mellitus type 2 in obese (HCC)    Medial epicondylitis of left elbow    Post traumatic adhesive capsulitis of both shoulders    Bilateral hearing loss       Past Medical History:  Past Medical History:   Diagnosis Date    Acute biliary pancreatitis 10/18/2020    Anemia     mild anemia    Back pain     Heart burn     History of chest pain     Hypertension     Palpitations     Problems with hearing     Problems with swallowing     Sickle cell trait ([address] )     last assessed 10/31/14    Snoring     SOB (shortness of breath)     Spinal stenosis     ddd spinal stenosis    Swelling        Past Surgical History:  Past Surgical History:   Procedure Laterality Date    BACK SURGERY      lower back    COLONOSCOPY  01/2016    due 2026, Margarette Hernandez  12/07       Family History:  Family History   Problem Relation Age of Onset    Diabetes Father     Sickle cell anemia Family     Diabetes Sister     Cancer Brother        Social History:  Social History     Socioeconomic History    Marital status: /Civil Union     Spouse name: Not on file    Number of children: Not on file    Years of education: Not on file    Highest education level: Not on file   Occupational History    Not on file   Tobacco Use    Smoking status: Never Smoker    Smokeless tobacco: Never Used   Vaping Use    Vaping Use: Never used   Substance and Sexual Activity    Alcohol use: No    Drug use: No    Sexual activity: Not on file   Other Topics Concern    Not on file   Social History Narrative    Not on file     Social Determinants of Health     Financial Resource Strain:     Difficulty of Paying Living Expenses:    Food Insecurity:     Worried About [address] [facility] in the Last Year:     [address] in the Last Year:    Transportation Needs:     Lack of Transportation (Medical):  Lack of Transportation (Non-Medical):    Physical Activity:     Days of Exercise per Week:     Minutes of Exercise per Session:    Stress:     Feeling of Stress :    Social Connections:     Frequency of Communication with Friends and Family:     Frequency of Social Gatherings with Friends and Family:     Attends Judaism Services:     Active Member of Clubs or Organizations:     Attends Club or Organization Meetings:     Marital Status:    Intimate Partner Violence:     Fear of Current or Ex-Partner:     Emotionally Abused:     Physically Abused:     Sexually Abused:          Objective     Vitals:    09/03/21 0802   BP: 130/70   Pulse: 59   Resp: 18   Temp: 98 8 °F (37 1 °C)   SpO2: 98%   Weight: 85 8 kg (189 lb 2 oz)   Height: 5' 6" (1 676 m)       Wt Readings from Last 3 Encounters:   09/03/21 85 8 kg (189 lb 2 oz)   08/24/21 86 4 kg (190 lb 8 oz)   06/24/21 87 5 kg (193 lb)       Physical Exam  Vitals and nursing note reviewed  Constitutional:       Appearance: He is well-developed  HENT:      Head: Normocephalic and atraumatic  Right Ear: Tympanic membrane normal       Left Ear: Tympanic membrane normal    Eyes:      Conjunctiva/sclera: Conjunctivae normal    Neck:      Vascular: No carotid bruit  Cardiovascular:      Rate and Rhythm: Normal rate and regular rhythm  Pulses: no weak pulses          Dorsalis pedis pulses are 2+ on the right side and 2+ on the left side  Heart sounds: Normal heart sounds  No murmur heard  Pulmonary:      Effort: Pulmonary effort is normal  No respiratory distress  Breath sounds: Normal breath sounds  No wheezing or rales     Abdominal:      General: Bowel sounds are normal  There is no distension or abdominal bruit  Palpations: Abdomen is soft  Tenderness: There is no abdominal tenderness  Hernia: No hernia is present  Musculoskeletal:         General: Normal range of motion  Cervical back: Neck supple  Right lower leg: No edema  Left lower leg: No edema  Comments: Right UE- painful retricted abduction- at  degrees   Feet:      Right foot:      Skin integrity: No ulcer, skin breakdown, erythema, warmth, callus or dry skin  Left foot:      Skin integrity: No ulcer, skin breakdown, erythema, warmth, callus or dry skin  Skin:     General: Skin is warm  Findings: No rash  Neurological:      General: No focal deficit present  Mental Status: He is alert and oriented to person, place, and time  Cranial Nerves: No cranial nerve deficit  Psychiatric:         Mood and Affect: Mood normal          Behavior: Behavior normal          Thought Content: Thought content normal      Patient's shoes and socks removed  Right Foot/Ankle   Right Foot Inspection  Skin Exam: skin normal and skin intact no dry skin, no warmth, no callus, no erythema, no maceration, no abnormal color, no pre-ulcer, no ulcer and no callus                          Toe Exam: ROM and strength within normal limits  Sensory   Vibration: intact  Proprioception: intact   Monofilament testing: intact  Vascular    The right DP pulse is 2+  Left Foot/Ankle  Left Foot Inspection  Skin Exam: skin normal and skin intactno dry skin, no warmth, no erythema, no maceration, normal color, no pre-ulcer, no ulcer and no callus                         Toe Exam: ROM and strength within normal limits                   Sensory   Vibration: intact  Proprioception: intact  Monofilament: diminished  Vascular    The left DP pulse is 2+  Assign Risk Category:  No deformity present; Loss of protective sensation;  No weak pulses       Risk: 1         Pertinent Laboratory/Diagnostic Studies:    Lab Results   Component Value Date    WBC 4 0 06/18/2021    HGB 13 2 06/18/2021    HCT 38 6 06/18/2021    MCV 95 1 06/18/2021     06/18/2021       Lab Results   Component Value Date    TSH 2 12 06/18/2021       Lab Results   Component Value Date    CHOL 113 10/27/2017     Lab Results   Component Value Date    TRIG 72 06/18/2021     Lab Results   Component Value Date    HDL 49 06/18/2021     Lab Results   Component Value Date    LDLCALC 58 06/18/2021     Lab Results   Component Value Date    HGBA1C 5 8 (H) 06/18/2021     Lab Results   Component Value Date    SODIUM 140 06/18/2021    K 4 3 06/18/2021     06/18/2021    CO2 27 06/18/2021    AGAP 7 09/11/2018    BUN 16 06/18/2021    CREATININE 0 81 06/18/2021    GLUC 116 (H) 06/18/2021    CALCIUM 9 2 06/18/2021    AST 18 06/18/2021    ALT 25 06/18/2021    ALKPHOS 72 06/18/2021    PROT 7 3 10/27/2017    TP 6 9 06/18/2021    BILITOT 0 8 10/27/2017    TBILI 1 0 06/18/2021    EGFR 72 09/11/2018       Orders Placed This Encounter   Procedures    CBC and differential    Comprehensive metabolic panel    Hemoglobin A1C    Lipid Panel with Direct LDL reflex    TSH, 3rd generation    PSA, Total Screen    Ambulatory Referral to Otolaryngology       ALLERGIES:  Allergies   Allergen Reactions    Metformin     Levofloxacin Rash     Other reaction(s): Unknown Allergic Reaction       Current Medications     Current Outpatient Medications   Medication Sig Dispense Refill    amLODIPine (NORVASC) 10 mg tablet TAKE 1 TABLET BY MOUTH EVERY DAY 90 tablet 3    aspirin 81 mg chewable tablet Chew 1 tablet daily      Blood Glucose Monitoring Suppl (ONE TOUCH ULTRA 2) w/Device KIT Use as directed  0    losartan (COZAAR) 100 MG tablet Take 100 mg by mouth daily       OneTouch Delica Lancets 83U MISC Use twice a day 180 each 3    OneTouch Ultra test strip TEST TWICE DAILY 100 each 3    rosuvastatin (CRESTOR) 10 MG tablet Take 1 tablet (10 mg total) by mouth daily 90 tablet 3    traMADol (ULTRAM) 50 mg tablet Take 1 tablet at bedtime as needed for shoulder/neck/low back pain 30 tablet 0    nabumetone (RELAFEN) 750 mg tablet TAKE 1 TABLET TWICE A DAY AFTER FOOD AS NEEDED FOR SHOULDER/NECK/BACK PAIN (Patient not taking: Reported on 8/24/2021) 60 tablet 3     No current facility-administered medications for this visit         Medications Discontinued During This Encounter   Medication Reason    irbesartan (AVAPRO) 300 mg tablet        Health Maintenance     Health Maintenance   Topic Date Due    Hepatitis C Screening  Never done    Annual Physical  Never done    DTaP,Tdap,and Td Vaccines (1 - Tdap) Never done    PT PLAN OF CARE  07/28/2021    Influenza Vaccine (1) 09/01/2021    BMI: Followup Plan  09/21/2021    Diabetic Foot Exam  09/21/2021    DM Eye Exam  10/25/2021    HEMOGLOBIN A1C  12/18/2021    Depression Screening PHQ  06/18/2022    Fall Risk  06/28/2022    BMI: Adult  09/03/2022    Colorectal Cancer Screening  01/05/2026    Pneumococcal Vaccine: 65+ Years  Completed    COVID-19 Vaccine  Completed    HIB Vaccine  Aged Out    Hepatitis B Vaccine  Aged Out    IPV Vaccine  Aged Out    Hepatitis A Vaccine  Aged Out    Meningococcal ACWY Vaccine  Aged Out    HPV Vaccine  Aged [employer] History   Administered Date(s) Administered    INFLUENZA 09/20/2018    Influenza Split High Dose Preservative Free IM 10/14/2016, 10/27/2017    Influenza, high dose seasonal 0 7 mL 09/20/2018, 09/10/2019, 09/16/2020    Influenza, seasonal, injectable 10/12/2007, 09/25/2009, 01/17/2014, 10/22/2014    Pneumococcal Conjugate 13-Valent 10/14/2016    Pneumococcal Polysaccharide PPV23 04/26/2013    SARS-CoV-2 / COVID-19 mRNA IM (Pfizer-BioNTech) 02/21/2021, 03/13/2021       Lynne Byers MD

## 2021-09-07 PROBLEM — K85.10 ACUTE BILIARY PANCREATITIS: Status: RESOLVED | Noted: 2020-10-18 | Resolved: 2021-09-07

## 2021-09-07 NOTE — ASSESSMENT & PLAN NOTE
Patient denies symptoms of angina, dyspnea or palpitations  He remains under care of Cardiology at [address] [place] [address]

## 2021-09-07 NOTE — ASSESSMENT & PLAN NOTE
Lab Results   Component Value Date    HGBA1C 5 8 (H) 06/18/2021   Diet controlled  Patient will repeat blood work in December

## 2021-09-07 NOTE — ASSESSMENT & PLAN NOTE
Patient reports overall significant improvement in range of motion  He is still experiencing persistent pain, right more than left      I advised patient to schedule follow-up with [facility] Orthopedic surgery, possibility of shoulder MRI was mention in previous Ortho notes

## 2021-09-08 DIAGNOSIS — E11.69 DIABETES MELLITUS TYPE 2 IN OBESE (HCC): ICD-10-CM

## 2021-09-08 DIAGNOSIS — E66.9 DIABETES MELLITUS TYPE 2 IN OBESE (HCC): ICD-10-CM

## 2021-09-09 DIAGNOSIS — E11.69 DIABETES MELLITUS TYPE 2 IN OBESE (HCC): ICD-10-CM

## 2021-09-09 DIAGNOSIS — E66.9 DIABETES MELLITUS TYPE 2 IN OBESE (HCC): ICD-10-CM

## 2021-09-09 RX ORDER — LANCETS
EACH MISCELLANEOUS
Qty: 300 EACH | Refills: 3 | Status: SHIPPED | OUTPATIENT
Start: 2021-09-09 | End: 2022-07-28

## 2021-09-09 RX ORDER — BLOOD SUGAR DIAGNOSTIC
STRIP MISCELLANEOUS
Qty: 300 STRIP | Refills: 3 | Status: SHIPPED | OUTPATIENT
Start: 2021-09-09

## 2021-09-23 ENCOUNTER — OFFICE VISIT (OUTPATIENT)
Dept: OBGYN CLINIC | Facility: OTHER | Age: 71
End: 2021-09-23
Payer: COMMERCIAL

## 2021-09-23 VITALS
SYSTOLIC BLOOD PRESSURE: 123 MMHG | BODY MASS INDEX: 30.53 KG/M2 | HEIGHT: 66 IN | HEART RATE: 52 BPM | WEIGHT: 190 LBS | DIASTOLIC BLOOD PRESSURE: 77 MMHG

## 2021-09-23 DIAGNOSIS — M75.02 ADHESIVE CAPSULITIS OF BOTH SHOULDERS: ICD-10-CM

## 2021-09-23 DIAGNOSIS — M25.511 ACUTE PAIN OF RIGHT SHOULDER: ICD-10-CM

## 2021-09-23 DIAGNOSIS — M24.811 INTERNAL DERANGEMENT OF SHOULDER, RIGHT: Primary | ICD-10-CM

## 2021-09-23 DIAGNOSIS — M75.01 ADHESIVE CAPSULITIS OF BOTH SHOULDERS: ICD-10-CM

## 2021-09-23 PROCEDURE — 99214 OFFICE O/P EST MOD 30 MIN: CPT | Performed by: ORTHOPAEDIC SURGERY

## 2021-09-23 PROCEDURE — 3008F BODY MASS INDEX DOCD: CPT | Performed by: FAMILY MEDICINE
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
## 2021-10-20 ENCOUNTER — HOSPITAL ENCOUNTER (OUTPATIENT)
Dept: MRI IMAGING | Facility: HOSPITAL | Age: 71
Discharge: HOME | End: 2021-10-20
Attending: ORTHOPAEDIC SURGERY
Payer: COMMERCIAL

## 2021-10-20 DIAGNOSIS — M25.511 ACUTE PAIN OF RIGHT SHOULDER: ICD-10-CM

## 2021-10-20 DIAGNOSIS — M75.01 ADHESIVE CAPSULITIS OF BOTH SHOULDERS: ICD-10-CM

## 2021-10-20 DIAGNOSIS — M75.02 ADHESIVE CAPSULITIS OF BOTH SHOULDERS: ICD-10-CM

## 2021-10-20 DIAGNOSIS — M24.811 INTERNAL DERANGEMENT OF SHOULDER, RIGHT: ICD-10-CM

## 2021-10-20 PROCEDURE — 73221 MRI JOINT UPR EXTREM W/O DYE: CPT

## 2021-10-20 PROCEDURE — G1004 CDSM NDSC: HCPCS

## 2021-10-25 ENCOUNTER — OFFICE VISIT (OUTPATIENT)
Dept: OBGYN CLINIC | Facility: OTHER | Age: 71
End: 2021-10-25
Payer: COMMERCIAL

## 2021-10-25 VITALS
WEIGHT: 195 LBS | HEART RATE: 61 BPM | DIASTOLIC BLOOD PRESSURE: 71 MMHG | SYSTOLIC BLOOD PRESSURE: 134 MMHG | BODY MASS INDEX: 31.47 KG/M2

## 2021-10-25 DIAGNOSIS — M75.101 TEAR OF RIGHT SUPRASPINATUS TENDON: Primary | ICD-10-CM

## 2021-10-25 PROCEDURE — 20610 DRAIN/INJ JOINT/BURSA W/O US: CPT | Performed by: ORTHOPAEDIC SURGERY

## 2021-10-25 PROCEDURE — 99214 OFFICE O/P EST MOD 30 MIN: CPT | Performed by: ORTHOPAEDIC SURGERY

## 2021-10-25 RX ORDER — BUPIVACAINE HYDROCHLORIDE 2.5 MG/ML
2 INJECTION, SOLUTION INFILTRATION; PERINEURAL
Status: COMPLETED | OUTPATIENT
Start: 2021-10-25 | End: 2021-10-25

## 2021-10-25 RX ORDER — BETAMETHASONE SODIUM PHOSPHATE AND BETAMETHASONE ACETATE 3; 3 MG/ML; MG/ML
6 INJECTION, SUSPENSION INTRA-ARTICULAR; INTRALESIONAL; INTRAMUSCULAR; SOFT TISSUE
Status: COMPLETED | OUTPATIENT
Start: 2021-10-25 | End: 2021-10-25

## 2021-10-25 RX ADMIN — BUPIVACAINE HYDROCHLORIDE 2 ML: 2.5 INJECTION, SOLUTION INFILTRATION; PERINEURAL at 08:20

## 2021-10-25 RX ADMIN — BETAMETHASONE SODIUM PHOSPHATE AND BETAMETHASONE ACETATE 6 MG: 3; 3 INJECTION, SUSPENSION INTRA-ARTICULAR; INTRALESIONAL; INTRAMUSCULAR; SOFT TISSUE at 08:20

## 2021-11-02 ENCOUNTER — EVALUATION (OUTPATIENT)
Dept: PHYSICAL THERAPY | Facility: REHABILITATION | Age: 71
End: 2021-11-02
Payer: COMMERCIAL

## 2021-11-02 DIAGNOSIS — M25.511 RIGHT SHOULDER PAIN, UNSPECIFIED CHRONICITY: Primary | ICD-10-CM

## 2021-11-02 DIAGNOSIS — M75.101 TEAR OF RIGHT SUPRASPINATUS TENDON: ICD-10-CM

## 2021-11-02 PROCEDURE — 97140 MANUAL THERAPY 1/> REGIONS: CPT | Performed by: PHYSICAL THERAPIST

## 2021-11-02 PROCEDURE — 97161 PT EVAL LOW COMPLEX 20 MIN: CPT | Performed by: PHYSICAL THERAPIST

## 2021-11-04 ENCOUNTER — OFFICE VISIT (OUTPATIENT)
Dept: PHYSICAL THERAPY | Facility: REHABILITATION | Age: 71
End: 2021-11-04
Payer: COMMERCIAL

## 2021-11-04 DIAGNOSIS — M25.511 RIGHT SHOULDER PAIN, UNSPECIFIED CHRONICITY: ICD-10-CM

## 2021-11-04 DIAGNOSIS — M75.101 TEAR OF RIGHT SUPRASPINATUS TENDON: Primary | ICD-10-CM

## 2021-11-04 PROCEDURE — 97110 THERAPEUTIC EXERCISES: CPT | Performed by: PHYSICAL THERAPIST

## 2021-11-04 PROCEDURE — 97140 MANUAL THERAPY 1/> REGIONS: CPT | Performed by: PHYSICAL THERAPIST

## 2021-11-04 PROCEDURE — 97112 NEUROMUSCULAR REEDUCATION: CPT | Performed by: PHYSICAL THERAPIST

## 2021-11-09 ENCOUNTER — OFFICE VISIT (OUTPATIENT)
Dept: PHYSICAL THERAPY | Facility: REHABILITATION | Age: 71
End: 2021-11-09
Payer: COMMERCIAL

## 2021-11-09 DIAGNOSIS — M25.511 RIGHT SHOULDER PAIN, UNSPECIFIED CHRONICITY: ICD-10-CM

## 2021-11-09 DIAGNOSIS — M75.101 TEAR OF RIGHT SUPRASPINATUS TENDON: Primary | ICD-10-CM

## 2021-11-09 PROCEDURE — 97110 THERAPEUTIC EXERCISES: CPT

## 2021-11-09 PROCEDURE — 97140 MANUAL THERAPY 1/> REGIONS: CPT

## 2021-11-12 ENCOUNTER — OFFICE VISIT (OUTPATIENT)
Dept: PHYSICAL THERAPY | Facility: REHABILITATION | Age: 71
End: 2021-11-12
Payer: COMMERCIAL

## 2021-11-12 DIAGNOSIS — M75.101 TEAR OF RIGHT SUPRASPINATUS TENDON: Primary | ICD-10-CM

## 2021-11-12 DIAGNOSIS — M25.511 RIGHT SHOULDER PAIN, UNSPECIFIED CHRONICITY: ICD-10-CM

## 2021-11-12 PROCEDURE — 97112 NEUROMUSCULAR REEDUCATION: CPT | Performed by: PHYSICAL THERAPIST

## 2021-11-12 PROCEDURE — 97140 MANUAL THERAPY 1/> REGIONS: CPT | Performed by: PHYSICAL THERAPIST

## 2021-11-12 PROCEDURE — 97110 THERAPEUTIC EXERCISES: CPT | Performed by: PHYSICAL THERAPIST

## 2021-11-16 ENCOUNTER — OFFICE VISIT (OUTPATIENT)
Dept: PHYSICAL THERAPY | Facility: REHABILITATION | Age: 71
End: 2021-11-16
Payer: COMMERCIAL

## 2021-11-16 DIAGNOSIS — M25.511 RIGHT SHOULDER PAIN, UNSPECIFIED CHRONICITY: ICD-10-CM

## 2021-11-16 DIAGNOSIS — M75.101 TEAR OF RIGHT SUPRASPINATUS TENDON: Primary | ICD-10-CM

## 2021-11-16 PROCEDURE — 97110 THERAPEUTIC EXERCISES: CPT | Performed by: PHYSICAL THERAPIST

## 2021-11-16 PROCEDURE — 97140 MANUAL THERAPY 1/> REGIONS: CPT | Performed by: PHYSICAL THERAPIST

## 2021-11-16 PROCEDURE — 97112 NEUROMUSCULAR REEDUCATION: CPT | Performed by: PHYSICAL THERAPIST

## 2021-11-18 ENCOUNTER — OFFICE VISIT (OUTPATIENT)
Dept: FAMILY MEDICINE CLINIC | Facility: CLINIC | Age: 71
End: 2021-11-18
Payer: COMMERCIAL

## 2021-11-18 VITALS
DIASTOLIC BLOOD PRESSURE: 60 MMHG | OXYGEN SATURATION: 97 % | SYSTOLIC BLOOD PRESSURE: 110 MMHG | BODY MASS INDEX: 31.98 KG/M2 | HEIGHT: 66 IN | WEIGHT: 199 LBS | HEART RATE: 65 BPM | RESPIRATION RATE: 16 BRPM | TEMPERATURE: 98 F

## 2021-11-18 DIAGNOSIS — M24.811 INTERNAL DERANGEMENT OF SHOULDER, RIGHT: ICD-10-CM

## 2021-11-18 DIAGNOSIS — Z76.89 ENCOUNTER FOR SKIN CARE: ICD-10-CM

## 2021-11-18 DIAGNOSIS — I10 BENIGN ESSENTIAL HYPERTENSION: ICD-10-CM

## 2021-11-18 DIAGNOSIS — E11.69 DIABETES MELLITUS TYPE 2 IN OBESE (HCC): Chronic | ICD-10-CM

## 2021-11-18 DIAGNOSIS — M96.1 POSTLAMINECTOMY SYNDROME OF LUMBAR REGION: Primary | ICD-10-CM

## 2021-11-18 DIAGNOSIS — R29.898 LEFT LEG WEAKNESS: ICD-10-CM

## 2021-11-18 DIAGNOSIS — E66.9 DIABETES MELLITUS TYPE 2 IN OBESE (HCC): Chronic | ICD-10-CM

## 2021-11-18 DIAGNOSIS — M48.07 SPINAL STENOSIS OF LUMBOSACRAL REGION: ICD-10-CM

## 2021-11-18 PROCEDURE — 3078F DIAST BP <80 MM HG: CPT | Performed by: FAMILY MEDICINE

## 2021-11-18 PROCEDURE — 1036F TOBACCO NON-USER: CPT | Performed by: FAMILY MEDICINE

## 2021-11-18 PROCEDURE — 3074F SYST BP LT 130 MM HG: CPT | Performed by: FAMILY MEDICINE

## 2021-11-18 PROCEDURE — 1160F RVW MEDS BY RX/DR IN RCRD: CPT | Performed by: FAMILY MEDICINE

## 2021-11-18 PROCEDURE — 3008F BODY MASS INDEX DOCD: CPT | Performed by: FAMILY MEDICINE

## 2021-11-18 PROCEDURE — 99214 OFFICE O/P EST MOD 30 MIN: CPT | Performed by: FAMILY MEDICINE

## 2021-11-18 RX ORDER — METHOCARBAMOL 750 MG/1
750 TABLET, FILM COATED ORAL
Qty: 90 TABLET | Refills: 1 | Status: SHIPPED | OUTPATIENT
Start: 2021-11-18

## 2021-11-18 RX ORDER — NABUMETONE 750 MG/1
TABLET, FILM COATED ORAL
Qty: 180 TABLET | Refills: 0 | Status: SHIPPED | OUTPATIENT
Start: 2021-11-18 | End: 2022-01-24

## 2021-11-19 ENCOUNTER — OFFICE VISIT (OUTPATIENT)
Dept: PHYSICAL THERAPY | Facility: REHABILITATION | Age: 71
End: 2021-11-19
Payer: COMMERCIAL

## 2021-11-19 DIAGNOSIS — M75.101 TEAR OF RIGHT SUPRASPINATUS TENDON: ICD-10-CM

## 2021-11-19 DIAGNOSIS — M25.511 RIGHT SHOULDER PAIN, UNSPECIFIED CHRONICITY: Primary | ICD-10-CM

## 2021-11-19 PROCEDURE — 97140 MANUAL THERAPY 1/> REGIONS: CPT | Performed by: PHYSICAL MEDICINE & REHABILITATION

## 2021-11-19 PROCEDURE — 97110 THERAPEUTIC EXERCISES: CPT | Performed by: PHYSICAL MEDICINE & REHABILITATION

## 2021-11-19 PROCEDURE — 97112 NEUROMUSCULAR REEDUCATION: CPT | Performed by: PHYSICAL MEDICINE & REHABILITATION

## 2021-11-23 ENCOUNTER — OFFICE VISIT (OUTPATIENT)
Dept: PHYSICAL THERAPY | Facility: REHABILITATION | Age: 71
End: 2021-11-23
Payer: COMMERCIAL

## 2021-11-23 DIAGNOSIS — M25.511 RIGHT SHOULDER PAIN, UNSPECIFIED CHRONICITY: Primary | ICD-10-CM

## 2021-11-23 DIAGNOSIS — M75.101 TEAR OF RIGHT SUPRASPINATUS TENDON: ICD-10-CM

## 2021-11-23 PROCEDURE — 97112 NEUROMUSCULAR REEDUCATION: CPT | Performed by: PHYSICAL THERAPIST

## 2021-11-23 PROCEDURE — 97140 MANUAL THERAPY 1/> REGIONS: CPT | Performed by: PHYSICAL THERAPIST

## 2021-11-23 PROCEDURE — 97110 THERAPEUTIC EXERCISES: CPT | Performed by: PHYSICAL THERAPIST

## 2021-11-26 ENCOUNTER — OFFICE VISIT (OUTPATIENT)
Dept: PHYSICAL THERAPY | Facility: REHABILITATION | Age: 71
End: 2021-11-26
Payer: COMMERCIAL

## 2021-11-26 DIAGNOSIS — M75.101 TEAR OF RIGHT SUPRASPINATUS TENDON: Primary | ICD-10-CM

## 2021-11-26 DIAGNOSIS — M25.511 RIGHT SHOULDER PAIN, UNSPECIFIED CHRONICITY: ICD-10-CM

## 2021-11-26 PROCEDURE — 97110 THERAPEUTIC EXERCISES: CPT | Performed by: PHYSICAL THERAPIST

## 2021-11-26 PROCEDURE — 97140 MANUAL THERAPY 1/> REGIONS: CPT | Performed by: PHYSICAL THERAPIST

## 2021-11-26 PROCEDURE — 97112 NEUROMUSCULAR REEDUCATION: CPT | Performed by: PHYSICAL THERAPIST

## 2021-11-30 ENCOUNTER — OFFICE VISIT (OUTPATIENT)
Dept: PHYSICAL THERAPY | Facility: REHABILITATION | Age: 71
End: 2021-11-30
Payer: COMMERCIAL

## 2021-11-30 DIAGNOSIS — M75.101 TEAR OF RIGHT SUPRASPINATUS TENDON: Primary | ICD-10-CM

## 2021-11-30 DIAGNOSIS — M25.511 RIGHT SHOULDER PAIN, UNSPECIFIED CHRONICITY: ICD-10-CM

## 2021-11-30 PROCEDURE — 97110 THERAPEUTIC EXERCISES: CPT | Performed by: PHYSICAL THERAPIST

## 2021-11-30 PROCEDURE — 97140 MANUAL THERAPY 1/> REGIONS: CPT | Performed by: PHYSICAL THERAPIST

## 2021-11-30 PROCEDURE — 97112 NEUROMUSCULAR REEDUCATION: CPT | Performed by: PHYSICAL THERAPIST

## 2021-12-03 ENCOUNTER — OFFICE VISIT (OUTPATIENT)
Dept: PHYSICAL THERAPY | Facility: REHABILITATION | Age: 71
End: 2021-12-03
Payer: COMMERCIAL

## 2021-12-03 DIAGNOSIS — M25.511 RIGHT SHOULDER PAIN, UNSPECIFIED CHRONICITY: ICD-10-CM

## 2021-12-03 DIAGNOSIS — M75.101 TEAR OF RIGHT SUPRASPINATUS TENDON: Primary | ICD-10-CM

## 2021-12-03 PROCEDURE — 97112 NEUROMUSCULAR REEDUCATION: CPT | Performed by: PHYSICAL THERAPIST

## 2021-12-03 PROCEDURE — 97110 THERAPEUTIC EXERCISES: CPT | Performed by: PHYSICAL THERAPIST

## 2021-12-03 PROCEDURE — 97140 MANUAL THERAPY 1/> REGIONS: CPT | Performed by: PHYSICAL THERAPIST

## 2021-12-07 ENCOUNTER — APPOINTMENT (OUTPATIENT)
Dept: PHYSICAL THERAPY | Facility: REHABILITATION | Age: 71
End: 2021-12-07
Payer: COMMERCIAL

## 2021-12-10 ENCOUNTER — APPOINTMENT (OUTPATIENT)
Dept: PHYSICAL THERAPY | Facility: REHABILITATION | Age: 71
End: 2021-12-10
Payer: COMMERCIAL

## 2021-12-14 ENCOUNTER — OFFICE VISIT (OUTPATIENT)
Dept: PHYSICAL THERAPY | Facility: REHABILITATION | Age: 71
End: 2021-12-14
Payer: COMMERCIAL

## 2021-12-14 DIAGNOSIS — M25.511 RIGHT SHOULDER PAIN, UNSPECIFIED CHRONICITY: ICD-10-CM

## 2021-12-14 DIAGNOSIS — M75.101 TEAR OF RIGHT SUPRASPINATUS TENDON: Primary | ICD-10-CM

## 2021-12-14 PROCEDURE — 97140 MANUAL THERAPY 1/> REGIONS: CPT | Performed by: PHYSICAL THERAPIST

## 2021-12-14 PROCEDURE — 97112 NEUROMUSCULAR REEDUCATION: CPT | Performed by: PHYSICAL THERAPIST

## 2021-12-14 PROCEDURE — 97110 THERAPEUTIC EXERCISES: CPT | Performed by: PHYSICAL THERAPIST

## 2021-12-20 ENCOUNTER — TELEPHONE (OUTPATIENT)
Dept: FAMILY MEDICINE CLINIC | Facility: CLINIC | Age: 71
End: 2021-12-20

## 2021-12-20 DIAGNOSIS — E11.69 DIABETES MELLITUS TYPE 2 IN OBESE (HCC): Primary | ICD-10-CM

## 2021-12-20 DIAGNOSIS — E66.9 DIABETES MELLITUS TYPE 2 IN OBESE (HCC): Primary | ICD-10-CM

## 2021-12-20 DIAGNOSIS — M48.07 SPINAL STENOSIS OF LUMBOSACRAL REGION: ICD-10-CM

## 2021-12-21 LAB
ALBUMIN SERPL-MCNC: 4.5 G/DL (ref 3.6–5.1)
ALBUMIN/GLOB SERPL: 2 (CALC) (ref 1–2.5)
ALP SERPL-CCNC: 82 U/L (ref 35–144)
ALT SERPL-CCNC: 26 U/L (ref 9–46)
AST SERPL-CCNC: 18 U/L (ref 10–35)
BASOPHILS # BLD AUTO: 32 CELLS/UL (ref 0–200)
BASOPHILS NFR BLD AUTO: 0.8 %
BILIRUB SERPL-MCNC: 0.7 MG/DL (ref 0.2–1.2)
BUN SERPL-MCNC: 12 MG/DL (ref 7–25)
BUN/CREAT SERPL: ABNORMAL (CALC) (ref 6–22)
CALCIUM SERPL-MCNC: 8.8 MG/DL (ref 8.6–10.3)
CHLORIDE SERPL-SCNC: 106 MMOL/L (ref 98–110)
CHOLEST SERPL-MCNC: 115 MG/DL
CHOLEST/HDLC SERPL: 2.3 (CALC)
CO2 SERPL-SCNC: 28 MMOL/L (ref 20–32)
CREAT SERPL-MCNC: 0.77 MG/DL (ref 0.7–1.18)
EOSINOPHIL # BLD AUTO: 32 CELLS/UL (ref 15–500)
EOSINOPHIL NFR BLD AUTO: 0.8 %
ERYTHROCYTE [DISTWIDTH] IN BLOOD BY AUTOMATED COUNT: 13 % (ref 11–15)
GLOBULIN SER CALC-MCNC: 2.3 G/DL (CALC) (ref 1.9–3.7)
GLUCOSE SERPL-MCNC: 132 MG/DL (ref 65–99)
HBA1C MFR BLD: 6.4 % OF TOTAL HGB
HCT VFR BLD AUTO: 39 % (ref 38.5–50)
HDLC SERPL-MCNC: 49 MG/DL
HGB BLD-MCNC: 13.3 G/DL (ref 13.2–17.1)
LDLC SERPL CALC-MCNC: 53 MG/DL (CALC)
LYMPHOCYTES # BLD AUTO: 1460 CELLS/UL (ref 850–3900)
LYMPHOCYTES NFR BLD AUTO: 36.5 %
MCH RBC QN AUTO: 31.3 PG (ref 27–33)
MCHC RBC AUTO-ENTMCNC: 34.1 G/DL (ref 32–36)
MCV RBC AUTO: 91.8 FL (ref 80–100)
MONOCYTES # BLD AUTO: 412 CELLS/UL (ref 200–950)
MONOCYTES NFR BLD AUTO: 10.3 %
NEUTROPHILS # BLD AUTO: 2064 CELLS/UL (ref 1500–7800)
NEUTROPHILS NFR BLD AUTO: 51.6 %
NONHDLC SERPL-MCNC: 66 MG/DL (CALC)
PLATELET # BLD AUTO: 138 THOUSAND/UL (ref 140–400)
PMV BLD REES-ECKER: 11 FL (ref 7.5–12.5)
POTASSIUM SERPL-SCNC: 4.5 MMOL/L (ref 3.5–5.3)
PROT SERPL-MCNC: 6.8 G/DL (ref 6.1–8.1)
PSA SERPL-MCNC: 1.22 NG/ML
RBC # BLD AUTO: 4.25 MILLION/UL (ref 4.2–5.8)
SL AMB EGFR AFRICAN AMERICAN: 104 ML/MIN/1.73M2
SL AMB EGFR NON AFRICAN AMERICAN: 90 ML/MIN/1.73M2
SODIUM SERPL-SCNC: 141 MMOL/L (ref 135–146)
TRIGL SERPL-MCNC: 51 MG/DL
TSH SERPL-ACNC: 2.1 MIU/L (ref 0.4–4.5)
WBC # BLD AUTO: 4 THOUSAND/UL (ref 3.8–10.8)

## 2021-12-21 PROCEDURE — 3044F HG A1C LEVEL LT 7.0%: CPT | Performed by: FAMILY MEDICINE

## 2021-12-29 ENCOUNTER — TELEPHONE (OUTPATIENT)
Dept: FAMILY MEDICINE CLINIC | Facility: CLINIC | Age: 71
End: 2021-12-29

## 2021-12-29 DIAGNOSIS — G89.29 CHRONIC LOW BACK PAIN, UNSPECIFIED BACK PAIN LATERALITY, UNSPECIFIED WHETHER SCIATICA PRESENT: Primary | ICD-10-CM

## 2021-12-29 DIAGNOSIS — M54.50 CHRONIC LOW BACK PAIN, UNSPECIFIED BACK PAIN LATERALITY, UNSPECIFIED WHETHER SCIATICA PRESENT: Primary | ICD-10-CM

## 2022-01-19 NOTE — PROGRESS NOTES
PT Evaluation     Today's date: 2022  Patient name: Emily Steward  : 1950  MRN: [id number]  Referring provider: Shey Shine MD  Dx:   Encounter Diagnosis     ICD-10-CM    1  Chronic low back pain, unspecified back pain laterality, unspecified whether sciatica present  M54 50 Ambulatory referral to Physical Therapy    G89 29        Start Time: 0815  Stop Time: 843  Total time in clinic (min): 28 minutes    Assessment  Assessment details: Emily Steward is a 66 y o  male who presents to therapy for chronic low back pain with LLE sx's  Pt has a flexion preference  Sx's inc with standing and walking and improve with sitting and lumbar flexion  This is limiting his tolerance to normal daily activities and 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  Pt will benefit from skilled outpatient PT to improve strength, to address therapy goals, to reduce pain, and improve function  Therapist explained to pt: findings of IE, rehab diagnosis, and POC  Pt-centered goals reviewed and confirmed by pt  Pt also expressed satisfaction that their current concerns were addressed at the end of the session  Impairments: abnormal gait, abnormal or restricted ROM, activity intolerance, impaired physical strength, lacks appropriate home exercise program and pain with function    Symptom irritability: moderateBarriers to therapy: None   Understanding of Dx/Px/POC: good   Prognosis: good    Goals  Short term goals: to be met in 3-4 weeks  Pt independent with initial HEP, rationale, technique and frequency, for ROM and pain control  Pt will report at least a 25% reduction in subjective pain complaints/symptoms to better manage ADLs and household chores  Improve max pain level by 2 points on the numeric pain rating scale indicating a clinically significant reduction in pain level    Pt will achieve an improvement in FOTO score indicating an improvement in pain and function  Long term goals: to be met in 6-8 weeks  Pt will have full and pain free lumbar ROM to better manage ADLs and household chores  Pt will report a 50% or > reduction in subjective pain complaints/symptoms to better manage ADLs and household chores  Pt will be able to stand for 30 min without needing to sit secondary to pain for improved tolerance to standing while teaching  Pt will improve FOTO score to better then expected outcome indicating an overall improvement in pain and function   Pt will be able to sleep through the night (6-8 hours) without waking secondary to pain  Achieve a neg slump test indicating reduction in neural tension/irritation  Pt independent with rationale, technique and plan for performance of advanced HEP to ensure independent self-management of symptoms upon discharge  Achieve pts therapy goal: stand and walk without pain    Plan  Patient would benefit from: skilled physical therapy  Planned modality interventions: TENS, thermotherapy: hydrocollator packs, traction, ultrasound, cryotherapy and low level laser therapy  Planned therapy interventions: body mechanics training, therapeutic training, therapeutic exercise, therapeutic activities, stretching, strengthening, neuromuscular re-education, patient education, home exercise program, functional ROM exercises, flexibility, manual therapy, Acevedo taping, joint mobilization and balance  Frequency: 2-3x/week  Duration in weeks: 8  Treatment plan discussed with: patient        Subjective Evaluation    History of Present Illness  Mechanism of injury: Patient reports chronic h/o bilateral low back pain that started prior to lumbar surgery  Has trouble standing up straight and walking long distances  Has trouble jogging and exercises  With prolonged walking, has sx's into the LLE and has to stop  Also states he cannot sleep on his back but also has trouble sleeping on his R side due to the shoulder pain   Had back surgery 10 years ago, unsure what surgery he had done  Was seeing a back pain specialist and was getting injections, but then stopped saying there was nothing else they could do other than surgery  Pt states he feels better than prior to surgery, but still having difficulty standing and walking upright  Does report n/t in the LLE to the mid calf level  No RLE sx's  No bowel/bladder changes  Does report inc in pain with sneezing  Pain is waking pt from sleeping  Also feels the leg is getting weaker  Recurrent probem    Pain  Current pain rating: 3  At best pain rating: 3  At worst pain ratin  Location: across low back and LLE  Quality: radiating  Aggravating factors: standing and walking  Progression: worsening    Social Support  Lives with: spouse    Employment status: working (professor)    Diagnostic Tests  X-ray: normal  Abnormal MRI: denied by insurance    Treatments  Previous treatment: physical therapy and injection treatment  Patient Goals  Patient goals for therapy: increased strength, decreased pain, increased motion, independence with ADLs/IADLs, return to work and return to sport/leisure activities  Patient goal: stand and walk without pain        Objective     Lumbar AROM  Lumbar flex WNL and reduces pain  Lumbar ext limited 75% increases pain  Lumbar rotation WNL, pain at end range  Lumbar side bend WNL increases pain    Lower quarter screen  LE reflexes   - Patellar and achilles absent bilat   - Neg clonus BLE  (+) slump LLE  (+) mildly positive SLR LLE  (+) ERICA L hip with reproduction of LBP  (-) FADIR bilat  (-) ERICA R hip  LE MMT grossly 5/5 throughout    Mobility testing  TTP L4-5 with increased mobility  Hypomobility throughout thoracic spine with PA mobility testing  Increased tone lumbar paraspinals    Gait:  Ambulates with flexed hips/slight forward trunk flexion      Flowsheet Rows      Most Recent Value   PT/OT G-Codes    Current Score 37   Projected Score 50              Precautions: HTN, Spinal stenosis, fall risk    FOTO = 37/50      Manuals 1/20            Lumbar flex /opening mobs L side             Hip distraction with belt                                       Neuro Re-Ed             LLE sciatic n glides             Post pelvic tilts                                                                              Ther Ex             key pose stretch with PT overpressure x10            pball roll out stretch             Kneeling hip flexor stretch             KB sit to stand                                                                 Ther Activity                                       Gait Training                                       Modalities

## 2022-01-20 ENCOUNTER — EVALUATION (OUTPATIENT)
Dept: PHYSICAL THERAPY | Facility: REHABILITATION | Age: 72
End: 2022-01-20
Payer: COMMERCIAL

## 2022-01-20 DIAGNOSIS — G89.29 CHRONIC LOW BACK PAIN, UNSPECIFIED BACK PAIN LATERALITY, UNSPECIFIED WHETHER SCIATICA PRESENT: ICD-10-CM

## 2022-01-20 DIAGNOSIS — M54.50 CHRONIC LOW BACK PAIN, UNSPECIFIED BACK PAIN LATERALITY, UNSPECIFIED WHETHER SCIATICA PRESENT: ICD-10-CM

## 2022-01-20 PROCEDURE — 97162 PT EVAL MOD COMPLEX 30 MIN: CPT | Performed by: PHYSICAL THERAPIST

## 2022-01-20 PROCEDURE — 97110 THERAPEUTIC EXERCISES: CPT | Performed by: PHYSICAL THERAPIST

## 2022-01-24 ENCOUNTER — OFFICE VISIT (OUTPATIENT)
Dept: PHYSICAL THERAPY | Facility: REHABILITATION | Age: 72
End: 2022-01-24
Payer: COMMERCIAL

## 2022-01-24 ENCOUNTER — APPOINTMENT (OUTPATIENT)
Dept: PHYSICAL THERAPY | Facility: REHABILITATION | Age: 72
End: 2022-01-24
Payer: COMMERCIAL

## 2022-01-24 DIAGNOSIS — M48.07 SPINAL STENOSIS OF LUMBOSACRAL REGION: ICD-10-CM

## 2022-01-24 DIAGNOSIS — M75.101 TEAR OF RIGHT SUPRASPINATUS TENDON: Primary | ICD-10-CM

## 2022-01-24 DIAGNOSIS — M96.1 POSTLAMINECTOMY SYNDROME OF LUMBAR REGION: ICD-10-CM

## 2022-01-24 DIAGNOSIS — M24.811 INTERNAL DERANGEMENT OF SHOULDER, RIGHT: ICD-10-CM

## 2022-01-24 PROCEDURE — 97112 NEUROMUSCULAR REEDUCATION: CPT | Performed by: PHYSICAL THERAPIST

## 2022-01-24 PROCEDURE — 97140 MANUAL THERAPY 1/> REGIONS: CPT | Performed by: PHYSICAL THERAPIST

## 2022-01-24 PROCEDURE — 97110 THERAPEUTIC EXERCISES: CPT | Performed by: PHYSICAL THERAPIST

## 2022-01-24 RX ORDER — NABUMETONE 750 MG/1
TABLET, FILM COATED ORAL
Qty: 180 TABLET | Refills: 3 | Status: SHIPPED | OUTPATIENT
Start: 2022-01-24

## 2022-01-24 NOTE — PROGRESS NOTES
Daily Note     Today's date: 2022  Patient name: Sindi Hugo  : 1950  MRN: [id number]  Referring provider: Claudette Knapp  Dx:   Encounter Diagnosis     ICD-10-CM    1  Tear of right supraspinatus tendon  M75 101    2  Internal derangement of shoulder, right  M24 811        Start Time: 0800  Stop Time: 0845  Total time in clinic (min): 45 minutes    Subjective: Pt reports the shoulder is still improving  Objective: See treatment diary below  Access Code: [email or web address]  Standing Isometric Shoulder External Rotation with Doorway - 2 x daily - 7 x weekly - 1 sets - 10 reps - 5 hold  Standing Isometric Shoulder Internal Rotation with Towel Roll at Doorway - 2 x daily - 7 x weekly - 1 sets - 10 reps - 5 hold  Isometric Shoulder Adduction - 2 x daily - 7 x weekly - 1 sets - 10 reps - 5 hold  Circular Shoulder Pendulum with Table Support - 2-6 x daily - 7 x weekly - 3 sets - 10 reps      Assessment: Pt returns for f/up for R shoulder after not attending PT since   Pt noted some pec soreness/tightness following prone ext  Added corner pec stretch which the pt responded well to  Pt will benefit from continued skilled outpatient PT to improve strength, to address therapy goals, to reduce pain, and improve function  Plan: Continue per plan of care  Progress treatment as tolerated         Precautions: HTN, hx of back surgery    FOTO   = 45/59  12/3 = 41/59      Manuals 1/24      11/28 11/30 12/3 12/14   LAD with oscill       DS DS DS DS   scap assess             Med nerve glides DS 2x15            Inf GHJ mob DS Gr IV post + inf      DS Gr IV post + inf DS Gr IV post + inf DS Gr IV post + inf DS Gr IV post + inf   Neuro Re-Ed             S/l ER 2# 3x10      1# 3x10 2# 3x10 2# 3x10 2# 3x10   Prone ext 2# 3x10      1# 3x10 2# 3x10 2# 3x10 2# 3x10   Prone row 2# 3x10      1# 3x10 2# 3x10 2# 3x10 2# 3x10   tband ER iso step outs GTB x15      GTB x15 nv OTB x15 nv   tband ext iso step back Peach x15 nv nv Peach x15                                          Ther Ex             pendulums             R shoulder PROM DS      DS DS DS DS   Corner pec stretch 20"x5                                                                             Ther Activity                                       Gait Training                                       Modalities

## 2022-01-25 NOTE — PROGRESS NOTES
Daily Note     Today's date: 2022  Patient name: Kell Henao  : 1950  MRN: [id number]  Referring provider: Elizabeth Naranjo MD  Dx:   Encounter Diagnosis     ICD-10-CM    1  Chronic low back pain, unspecified back pain laterality, unspecified whether sciatica present  M54 50     G89 29        Start Time: 0850  Stop Time: 09  Total time in clinic (min): 40 minutes    Subjective: Pt offers no new complaints  Objective: See treatment diary below      Assessment: Pt responded favorably to manuals  He required initial cues for proper form of posterior pelvic tilts and he fatigued with increased repetition  No change in pain with repeated flexion with pain returning when pt stands fully upright  Trailed standing glute sets but pt was unable to achieve  Added prone hip ext at [facility] to isolate the glute max; pt required max verbal and tactile cues for hip ext without lumbar ext and would benefit from further practice  Pt will benefit from continued skilled outpatient PT to improve strength, to address therapy goals, to reduce pain, and improve function  Plan: Continue per plan of care  Progress treatment as tolerated         Precautions: HTN, hx of back surgery     FOTO = 37/50       Manuals                    Lumbar flex /opening mobs bilat   DS                   Hip distraction with belt                        s/l manual post pelvic tilt with lumbar flexion b/l   DS                                           Neuro Re-Ed                       LLE sciatic n glides    x10                   Post pelvic tilts    5" 3x10                   glute set with hip ext at edge of dmitry   1xF bilat                                                                                                                   Ther Ex                       key pose stretch with PT overpressure x10                     repeated flexion in standing    3"x15                   Manual hip flexor stretch   30"x4 b/l                   KB sit to stand                        b/l knee to chest stretch   10"x10                    pball roll out stretch                                                                       Ther Activity                                                                       Gait Training                                                                       Modalities

## 2022-01-26 ENCOUNTER — OFFICE VISIT (OUTPATIENT)
Dept: PHYSICAL THERAPY | Facility: REHABILITATION | Age: 72
End: 2022-01-26
Payer: COMMERCIAL

## 2022-01-26 DIAGNOSIS — G89.29 CHRONIC LOW BACK PAIN, UNSPECIFIED BACK PAIN LATERALITY, UNSPECIFIED WHETHER SCIATICA PRESENT: Primary | ICD-10-CM

## 2022-01-26 DIAGNOSIS — M54.50 CHRONIC LOW BACK PAIN, UNSPECIFIED BACK PAIN LATERALITY, UNSPECIFIED WHETHER SCIATICA PRESENT: Primary | ICD-10-CM

## 2022-01-26 PROCEDURE — 97140 MANUAL THERAPY 1/> REGIONS: CPT | Performed by: PHYSICAL THERAPIST

## 2022-01-26 PROCEDURE — 97112 NEUROMUSCULAR REEDUCATION: CPT | Performed by: PHYSICAL THERAPIST

## 2022-01-26 PROCEDURE — 97110 THERAPEUTIC EXERCISES: CPT | Performed by: PHYSICAL THERAPIST

## 2022-01-26 NOTE — PROGRESS NOTES
Daily Note     Today's date: 2022  Patient name: Geovanni Bell  : 1950  MRN: [id number]  Referring provider: Juany Crystal  Dx:   Encounter Diagnosis     ICD-10-CM    1  Tear of right supraspinatus tendon  M75 101    2  Internal derangement of shoulder, right  M24 811        Start Time: 0807  Stop Time: 0843  Total time in clinic (min): 36 minutes    Subjective: Pt offers no new complaints  Objective: See treatment diary below  Access Code: [email or web address]  Standing Isometric Shoulder External Rotation with Doorway - 2 x daily - 7 x weekly - 1 sets - 10 reps - 5 hold  Standing Isometric Shoulder Internal Rotation with Towel Roll at Doorway - 2 x daily - 7 x weekly - 1 sets - 10 reps - 5 hold  Isometric Shoulder Adduction - 2 x daily - 7 x weekly - 1 sets - 10 reps - 5 hold  Circular Shoulder Pendulum with Table Support - 2-6 x daily - 7 x weekly - 3 sets - 10 reps      Assessment: Pt required initial cues to prevent cross body adduction during s/l ER  He continues to have pec insertion tightness with TTP, but less compared to last visit  Pt will benefit from continued skilled outpatient PT to improve strength, to address therapy goals, to reduce pain, and improve function  Plan: Continue per plan of care  Progress treatment as tolerated         Precautions: HTN, hx of back surgery    FOTO   = 45/59  12/3 = 41/59       Manuals 1/24 1/27     11/28 11/30 12/3 12/14   LAD with oscill       DS DS DS DS   scap assess             Med nerve glides DS 2x15 DS 2x15           Inf GHJ mob DS Gr IV post + inf DS Gr IV post + inf     DS Gr IV post + inf DS Gr IV post + inf DS Gr IV post + inf DS Gr IV post + inf   Neuro Re-Ed             S/l ER 2# 3x10 2# 3x10     1# 3x10 2# 3x10 2# 3x10 2# 3x10   Prone ext 2# 3x10 2# 3x10     1# 3x10 2# 3x10 2# 3x10 2# 3x10   Prone row 2# 3x10 2# 3x10     1# 3x10 2# 3x10 2# 3x10 2# 3x10   tband ER iso step outs GTB x15 GTB x15     GTB x15 nv OTB x15 nv   tband ext iso step back       Peach x15 nv nv Peach x15                                          Ther Ex             pendulums             R shoulder PROM DS DS     DS DS DS DS   Corner pec stretch 20"x5 20"x5                                                                            Ther Activity                                       Gait Training                                       Modalities

## 2022-01-27 ENCOUNTER — OFFICE VISIT (OUTPATIENT)
Dept: PHYSICAL THERAPY | Facility: REHABILITATION | Age: 72
End: 2022-01-27
Payer: COMMERCIAL

## 2022-01-27 ENCOUNTER — APPOINTMENT (OUTPATIENT)
Dept: PHYSICAL THERAPY | Facility: REHABILITATION | Age: 72
End: 2022-01-27
Payer: COMMERCIAL

## 2022-01-27 DIAGNOSIS — M24.811 INTERNAL DERANGEMENT OF SHOULDER, RIGHT: ICD-10-CM

## 2022-01-27 DIAGNOSIS — M75.101 TEAR OF RIGHT SUPRASPINATUS TENDON: Primary | ICD-10-CM

## 2022-01-27 PROCEDURE — 97110 THERAPEUTIC EXERCISES: CPT | Performed by: PHYSICAL THERAPIST

## 2022-01-27 PROCEDURE — 97112 NEUROMUSCULAR REEDUCATION: CPT | Performed by: PHYSICAL THERAPIST

## 2022-01-27 PROCEDURE — 97140 MANUAL THERAPY 1/> REGIONS: CPT | Performed by: PHYSICAL THERAPIST

## 2022-01-31 NOTE — PROGRESS NOTES
Daily Note     Today's date: 2022  Patient name: Roberto Patel  : 1950  MRN: [id number]  Referring provider: Veronika Juarez  Dx:   Encounter Diagnosis     ICD-10-CM    1  Tear of right supraspinatus tendon  M75 101    2  Internal derangement of shoulder, right  M24 811        Start Time: 0800  Stop Time: 0838  Total time in clinic (min): 38 minutes    Subjective: Pt reports his shoulder has been feeling better  Objective: See treatment diary below  Access Code: [email or web address]  Standing Isometric Shoulder External Rotation with Doorway - 2 x daily - 7 x weekly - 1 sets - 10 reps - 5 hold  Standing Isometric Shoulder Internal Rotation with Towel Roll at Doorway - 2 x daily - 7 x weekly - 1 sets - 10 reps - 5 hold  Isometric Shoulder Adduction - 2 x daily - 7 x weekly - 1 sets - 10 reps - 5 hold  Circular Shoulder Pendulum with Table Support - 2-6 x daily - 7 x weekly - 3 sets - 10 reps      Assessment: Pt requested to leave a little early to make it work on time  He responded well to the exercises, but continues to have pain with increased UE activity  Plan: Continue per plan of care  Progress treatment as tolerated         Precautions: HTN, hx of back surgery    FOTO   = 45/59  12/3 = 41/59    = 45/59      Manuals 1/24 1/27 2/1    11/28 11/30 12/3 12/14   LAD with oscill       DS DS DS DS   scap assess             Med nerve glides DS 2x15 DS 2x15 DS 2x15          Inf GHJ mob DS Gr IV post + inf DS Gr IV post + inf DS Gr IV post + inf    DS Gr IV post + inf DS Gr IV post + inf DS Gr IV post + inf DS Gr IV post + inf   Neuro Re-Ed             S/l ER 2# 3x10 2# 3x10 2# 3x10    1# 3x10 2# 3x10 2# 3x10 2# 3x10   Prone ext 2# 3x10 2# 3x10 2# 3x10    1# 3x10 2# 3x10 2# 3x10 2# 3x10   Prone row 2# 3x10 2# 3x10 2# 3x10    1# 3x10 2# 3x10 2# 3x10 2# 3x10   tband ER iso step outs GTB x15 GTB x15 nv    GTB x15 nv OTB x15 nv   tband ext iso step back       Peach x15 nv nv Peach x15 Ther Ex             pendulums             R shoulder PROM DS DS DS    DS DS DS DS   Corner pec stretch 20"x5 20"x5 nv                                                                           Ther Activity                                       Gait Training                                       Modalities

## 2022-02-01 ENCOUNTER — OFFICE VISIT (OUTPATIENT)
Dept: PHYSICAL THERAPY | Facility: REHABILITATION | Age: 72
End: 2022-02-01
Payer: COMMERCIAL

## 2022-02-01 DIAGNOSIS — M75.101 TEAR OF RIGHT SUPRASPINATUS TENDON: Primary | ICD-10-CM

## 2022-02-01 DIAGNOSIS — M24.811 INTERNAL DERANGEMENT OF SHOULDER, RIGHT: ICD-10-CM

## 2022-02-01 PROCEDURE — 97110 THERAPEUTIC EXERCISES: CPT | Performed by: PHYSICAL THERAPIST

## 2022-02-01 PROCEDURE — 97112 NEUROMUSCULAR REEDUCATION: CPT | Performed by: PHYSICAL THERAPIST

## 2022-02-01 PROCEDURE — 97140 MANUAL THERAPY 1/> REGIONS: CPT | Performed by: PHYSICAL THERAPIST

## 2022-02-02 ENCOUNTER — APPOINTMENT (OUTPATIENT)
Dept: PHYSICAL THERAPY | Facility: REHABILITATION | Age: 72
End: 2022-02-02
Payer: COMMERCIAL

## 2022-02-03 NOTE — PROGRESS NOTES
Daily Note     Today's date: 2/3/2022  Patient name: Mack Barger  : 1950  MRN: [id number]  Referring provider: Xiomara Elise MD  Dx:   Encounter Diagnosis     ICD-10-CM    1  Chronic low back pain, unspecified back pain laterality, unspecified whether sciatica present  M54 50     G89 29                   Subjective: Pt reports his shoulder has been feeling better  Objective: See treatment diary below  Access Code: [email or web address]  Standing Isometric Shoulder External Rotation with Doorway - 2 x daily - 7 x weekly - 1 sets - 10 reps - 5 hold  Standing Isometric Shoulder Internal Rotation with Towel Roll at Doorway - 2 x daily - 7 x weekly - 1 sets - 10 reps - 5 hold  Isometric Shoulder Adduction - 2 x daily - 7 x weekly - 1 sets - 10 reps - 5 hold  Circular Shoulder Pendulum with Table Support - 2-6 x daily - 7 x weekly - 3 sets - 10 reps      Assessment: Pt requested to leave a little early to make it work on time  He responded well to the exercises, but continues to have pain with increased UE activity  Plan: Continue per plan of care  Progress treatment as tolerated         Precautions: HTN, hx of back surgery    FOTO   = 45/59  12/3 = 41/59    = 45/59      Manuals 1/24 1/27 2/1    11/28 11/30 12/3 12/14   LAD with oscill       DS DS DS DS   scap assess             Med nerve glides DS 2x15 DS 2x15 DS 2x15          Inf GHJ mob DS Gr IV post + inf DS Gr IV post + inf DS Gr IV post + inf    DS Gr IV post + inf DS Gr IV post + inf DS Gr IV post + inf DS Gr IV post + inf   Neuro Re-Ed             S/l ER 2# 3x10 2# 3x10 2# 3x10    1# 3x10 2# 3x10 2# 3x10 2# 3x10   Prone ext 2# 3x10 2# 3x10 2# 3x10    1# 3x10 2# 3x10 2# 3x10 2# 3x10   Prone row 2# 3x10 2# 3x10 2# 3x10    1# 3x10 2# 3x10 2# 3x10 2# 3x10   tband ER iso step outs GTB x15 GTB x15 nv    GTB x15 nv OTB x15 nv   tband ext iso step back       Peach x15 nv nv Peach x15                                          Ther Ex pendulums             R shoulder PROM DS DS DS    DS DS DS DS   Corner pec stretch 20"x5 20"x5 nv                                                                           Ther Activity                                       Gait Training                                       Modalities

## 2022-02-04 ENCOUNTER — OFFICE VISIT (OUTPATIENT)
Dept: PHYSICAL THERAPY | Facility: REHABILITATION | Age: 72
End: 2022-02-04
Payer: COMMERCIAL

## 2022-02-04 ENCOUNTER — APPOINTMENT (OUTPATIENT)
Dept: PHYSICAL THERAPY | Facility: REHABILITATION | Age: 72
End: 2022-02-04
Payer: COMMERCIAL

## 2022-02-04 DIAGNOSIS — G89.29 CHRONIC LOW BACK PAIN, UNSPECIFIED BACK PAIN LATERALITY, UNSPECIFIED WHETHER SCIATICA PRESENT: Primary | ICD-10-CM

## 2022-02-04 DIAGNOSIS — M54.50 CHRONIC LOW BACK PAIN, UNSPECIFIED BACK PAIN LATERALITY, UNSPECIFIED WHETHER SCIATICA PRESENT: Primary | ICD-10-CM

## 2022-02-04 PROCEDURE — 97140 MANUAL THERAPY 1/> REGIONS: CPT | Performed by: PHYSICAL THERAPIST

## 2022-02-04 PROCEDURE — 97110 THERAPEUTIC EXERCISES: CPT | Performed by: PHYSICAL THERAPIST

## 2022-02-04 PROCEDURE — 97112 NEUROMUSCULAR REEDUCATION: CPT | Performed by: PHYSICAL THERAPIST

## 2022-02-04 NOTE — PROGRESS NOTES
Daily Note     Today's date: 2022  Patient name: Robert Hernandez  : 1950  MRN: [id number]  Referring provider: Klaey Craig MD  Dx:   Encounter Diagnosis     ICD-10-CM    1  Chronic low back pain, unspecified back pain laterality, unspecified whether sciatica present  M54 50     G89 29        Start Time: 0800  Stop Time: 0845  Total time in clinic (min): 45 minutes    Subjective: Pt offers no new complaints  Objective: See treatment diary below      Assessment: Pt reported LLE n/t with hip flexor stretching therefore femoral n glides were added to program to address neural tension  Pt responded well to these  He has more neural tension on the LLE v the RLE  He continues to demonstrates a flexion preference  Advised pt to add standing lumbar flexion and double knee to chest as well as femoral n glides to HEP  Pt will benefit from continued skilled outpatient PT to improve strength, to address therapy goals, to reduce pain, and improve function  Plan: Continue per plan of care  Progress treatment as tolerated         Precautions: HTN, hx of back surgery     FOTO = 37/50       Manuals   2/4                 Lumbar flex /opening mobs bilat   DS                   Hip distraction with belt                        s/l manual post pelvic tilt with lumbar flexion b/l   DS  DS                 breaking bread stretch bilat     DS                 Neuro Re-Ed                       LLE sciatic n glides    x10                   Post pelvic tilts    5" 3x10  5" 3x10                 glute set with hip ext at edge of dmitry   1xF bilat                   prone femoral n glide, contra LE off mat      1x15 b/l                  KB sit to stand      6# x10                                                                 Ther Ex                       key pose stretch with PT overpressure x10                     repeated flexion in standing    3"x15  3"x15                 Manual hip flexor stretch   30"x4 b/l  30"x4 b/l                                         b/l knee to chest stretch   10"x10  10"x10                  pball roll out stretch                                                                       Ther Activity                                                                       Gait Training                                                                       Modalities

## 2022-02-07 NOTE — PROGRESS NOTES
Daily Note     Today's date: 2022  Patient name: Radha Bradford  : 1950  MRN: [id number]  Referring provider: Naida Grider  Dx:   Encounter Diagnosis     ICD-10-CM    1  Tear of right supraspinatus tendon  M75 101    2  Internal derangement of shoulder, right  M24 811        Start Time: 0800  Stop Time: 0838  Total time in clinic (min): 38 minutes    Subjective: Pt reports his shoulder has been feeling better, but still bothersome  Objective: See treatment diary below      Assessment: Pt required initial cues for proper form during band exercises  He fatigued with progression in s/l ER, prone row, and prone ext  Pt will benefit from continued skilled outpatient PT to improve strength, to address therapy goals, to reduce pain, and improve function  Plan: Continue per plan of care  Progress treatment as tolerated         Precautions: HTN, hx of back surgery    FOTO   = 45/59  12/3 = 41/59    = 45/59      Manuals 1/24 1/27 2/1 2/8   11/28 11/30 12/3 12/14   LAD with oscill       DS DS DS DS   scap assess             Med nerve glides DS 2x15 DS 2x15 DS 2x15 DS 2x15         Inf GHJ mob DS Gr IV post + inf DS Gr IV post + inf DS Gr IV post + inf DS Gr IV post + inf   DS Gr IV post + inf DS Gr IV post + inf DS Gr IV post + inf DS Gr IV post + inf   Neuro Re-Ed             S/l ER 2# 3x10 2# 3x10 2# 3x10 3# 3x10   1# 3x10 2# 3x10 2# 3x10 2# 3x10   Prone ext 2# 3x10 2# 3x10 2# 3x10 3# 3x10   1# 3x10 2# 3x10 2# 3x10 2# 3x10   Prone row 2# 3x10 2# 3x10 2# 3x10 3# 3x10   1# 3x10 2# 3x10 2# 3x10 2# 3x10   tband ER iso step outs GTB x15 GTB x15 nv Thru range GTB 2x10   GTB x15 nv OTB x15 nv   tband ext iso step back    Seated thru range OTB 2x10   Peach x15 nv nv Peach x15                                          Ther Ex             pendulums             R shoulder PROM DS DS DS DS   DS DS DS DS   Corner pec stretch 20"x5 20"x5 nv Ther Activity                                       Gait Training                                       Modalities

## 2022-02-08 ENCOUNTER — OFFICE VISIT (OUTPATIENT)
Dept: PHYSICAL THERAPY | Facility: REHABILITATION | Age: 72
End: 2022-02-08
Payer: COMMERCIAL

## 2022-02-08 DIAGNOSIS — M24.811 INTERNAL DERANGEMENT OF SHOULDER, RIGHT: ICD-10-CM

## 2022-02-08 DIAGNOSIS — M75.101 TEAR OF RIGHT SUPRASPINATUS TENDON: Primary | ICD-10-CM

## 2022-02-08 PROCEDURE — 97140 MANUAL THERAPY 1/> REGIONS: CPT | Performed by: PHYSICAL THERAPIST

## 2022-02-08 PROCEDURE — 97110 THERAPEUTIC EXERCISES: CPT | Performed by: PHYSICAL THERAPIST

## 2022-02-08 PROCEDURE — 97112 NEUROMUSCULAR REEDUCATION: CPT | Performed by: PHYSICAL THERAPIST

## 2022-02-08 NOTE — PROGRESS NOTES
Daily Note     Today's date: 2022  Patient name: Ivy Casas  : 1950  MRN: [id number]  Referring provider: Joe Maldonado MD  Dx:   Encounter Diagnosis     ICD-10-CM    1  Chronic low back pain, unspecified back pain laterality, unspecified whether sciatica present  M54 50     G89 29        Start Time: 0800  Stop Time: 0840  Total time in clinic (min): 40 minutes    Subjective: Pt states his low back is still painful with standing and walking, improves with sitting  Objective: See treatment diary below      Assessment: Sx's improve with standing lumbar flexion, but return on standing  No change with repeated lumbar flex in standing  However in sitting, pt had reduction in sx's with repeated lumbar flex  Advised pt to perform seated lumbar flexion x10 every waking hour  Pt demonstrates good performance of posterior pelvic tilts in supine, but had difficulty performing this in standing against wall  He was able to perform a TA contraction, but required max cues and demonstration to posteriorly tilt the pelvis and would benefit from further practice  Plan: Continue per plan of care  Progress treatment as tolerated         Precautions: HTN, hx of back surgery     FOTO = 37/50       Manuals                Lumbar flex /opening mobs bilat   DS    opening - DS bilat               Hip distraction with belt                        s/l manual post pelvic tilt with lumbar flexion b/l   DS  DS  DS               breaking bread stretch bilat     DS                 Neuro Re-Ed                       LLE sciatic n glides    x10                   Post pelvic tilts    5" 3x10  5" 3x10  5"3x10               glute set with hip ext at edge of dmitry   1xF bilat                   prone femoral n glide, contra LE off mat      1x15 b/l                  KB sit to stand      6# x10                  posterior pelvic tilts in standing        3x10                                       Ther Ex                       key pose stretch with PT overpressure x10                     repeated flexion in standing    3"x15  3"x15  3"x15               Manual hip flexor stretch   30"x4 b/l 30"x4 b/l 30"x4 b/l               Seated repeated flex       5"x10                b/l knee to chest stretch   10"x10  10"x10  10"x10                pball roll out stretch                                                                       Ther Activity                                                                       Gait Training                                                                       Modalities

## 2022-02-09 ENCOUNTER — OFFICE VISIT (OUTPATIENT)
Dept: PHYSICAL THERAPY | Facility: REHABILITATION | Age: 72
End: 2022-02-09
Payer: COMMERCIAL

## 2022-02-09 DIAGNOSIS — G89.29 CHRONIC LOW BACK PAIN, UNSPECIFIED BACK PAIN LATERALITY, UNSPECIFIED WHETHER SCIATICA PRESENT: Primary | ICD-10-CM

## 2022-02-09 DIAGNOSIS — M54.50 CHRONIC LOW BACK PAIN, UNSPECIFIED BACK PAIN LATERALITY, UNSPECIFIED WHETHER SCIATICA PRESENT: Primary | ICD-10-CM

## 2022-02-09 PROCEDURE — 97112 NEUROMUSCULAR REEDUCATION: CPT | Performed by: PHYSICAL THERAPIST

## 2022-02-09 PROCEDURE — 97110 THERAPEUTIC EXERCISES: CPT | Performed by: PHYSICAL THERAPIST

## 2022-02-09 PROCEDURE — 97140 MANUAL THERAPY 1/> REGIONS: CPT | Performed by: PHYSICAL THERAPIST

## 2022-02-11 ENCOUNTER — APPOINTMENT (OUTPATIENT)
Dept: PHYSICAL THERAPY | Facility: REHABILITATION | Age: 72
End: 2022-02-11
Payer: COMMERCIAL

## 2022-02-11 NOTE — PROGRESS NOTES
Daily Note     Today's date: 2022  Patient name: Javan Jean  : 1950  MRN: [id number]  Referring provider: Cong Robles  Dx:   Encounter Diagnosis     ICD-10-CM    1  Tear of right supraspinatus tendon  M75 101    2  Internal derangement of shoulder, right  M24 811                   Subjective: Pt reports his shoulder has been feeling better, but still bothersome  Objective: See treatment diary below      Assessment: Pt required initial cues for proper form during band exercises  He fatigued with progression in s/l ER, prone row, and prone ext  Pt will benefit from continued skilled outpatient PT to improve strength, to address therapy goals, to reduce pain, and improve function  Plan: Continue per plan of care  Progress treatment as tolerated         Precautions: HTN, hx of back surgery    FOTO   = 45/59  12/3 = 41/59    = 45/59      Manuals 1/24 1/27 2/1 2/8   11/28 11/30 12/3 12/14   LAD with oscill       DS DS DS DS   scap assess             Med nerve glides DS 2x15 DS 2x15 DS 2x15 DS 2x15         Inf GHJ mob DS Gr IV post + inf DS Gr IV post + inf DS Gr IV post + inf DS Gr IV post + inf   DS Gr IV post + inf DS Gr IV post + inf DS Gr IV post + inf DS Gr IV post + inf   Neuro Re-Ed             S/l ER 2# 3x10 2# 3x10 2# 3x10 3# 3x10   1# 3x10 2# 3x10 2# 3x10 2# 3x10   Prone ext 2# 3x10 2# 3x10 2# 3x10 3# 3x10   1# 3x10 2# 3x10 2# 3x10 2# 3x10   Prone row 2# 3x10 2# 3x10 2# 3x10 3# 3x10   1# 3x10 2# 3x10 2# 3x10 2# 3x10   tband ER iso step outs GTB x15 GTB x15 nv Thru range GTB 2x10   GTB x15 nv OTB x15 nv   tband ext iso step back    Seated thru range OTB 2x10   Peach x15 nv nv Peach x15                                          Ther Ex             pendulums             R shoulder PROM DS DS DS DS   DS DS DS DS   Corner pec stretch 20"x5 20"x5 nv                                                                           Ther Activity Gait Training                                       Modalities

## 2022-02-15 ENCOUNTER — OFFICE VISIT (OUTPATIENT)
Dept: PHYSICAL THERAPY | Facility: REHABILITATION | Age: 72
End: 2022-02-15
Payer: COMMERCIAL

## 2022-02-15 DIAGNOSIS — M24.811 INTERNAL DERANGEMENT OF SHOULDER, RIGHT: ICD-10-CM

## 2022-02-15 DIAGNOSIS — M75.101 TEAR OF RIGHT SUPRASPINATUS TENDON: Primary | ICD-10-CM

## 2022-02-15 PROCEDURE — 97112 NEUROMUSCULAR REEDUCATION: CPT | Performed by: PHYSICAL THERAPIST

## 2022-02-15 PROCEDURE — 97110 THERAPEUTIC EXERCISES: CPT | Performed by: PHYSICAL THERAPIST

## 2022-02-15 PROCEDURE — 97140 MANUAL THERAPY 1/> REGIONS: CPT | Performed by: PHYSICAL THERAPIST

## 2022-02-15 NOTE — PROGRESS NOTES
Bed: 031A  Expected date:   Expected time:   Means of arrival: [facility]  Comments:      Robina Cheadle, RN  01/12/21 9226 Daily Note     Today's date: 2/15/2022  Patient name: Michael Aranda  : 1950  MRN: [id number]  Referring provider: Sumi Caraballo  Dx:   Encounter Diagnosis     ICD-10-CM    1  Tear of right supraspinatus tendon  M75 101    2  Internal derangement of shoulder, right  M24 811        Start Time: 0800  Stop Time: 0840  Total time in clinic (min): 40 minutes    Subjective: Pt reports his shoulder feels a little better  Would like to plan to finish up PT for the shoulder next week and focus on his low back  Objective: See treatment diary below      Assessment: Pt fatigued with standing shoulder ER  He required cues to prevent cross body adduction  Iniital cues also given for proper performance of side lying ER  Plan: Continue per plan of care  Progress treatment as tolerated         Precautions: HTN, hx of back surgery    FOTO   = 45/59  12/3 = 41/59    = 45/59      Manuals 1/24 1/27 2/1 2/8 2/15  11/28 11/30 12/3 12/14   LAD with oscill       DS DS DS DS   scap assess             Med nerve glides DS 2x15 DS 2x15 DS 2x15 DS 2x15 DS 2x15        Inf GHJ mob DS Gr IV post + inf DS Gr IV post + inf DS Gr IV post + inf DS Gr IV post + inf DS Gr IV post + inf  DS Gr IV post + inf DS Gr IV post + inf DS Gr IV post + inf DS Gr IV post + inf   Neuro Re-Ed             S/l ER 2# 3x10 2# 3x10 2# 3x10 3# 3x10 3# 3x10  1# 3x10 2# 3x10 2# 3x10 2# 3x10   Prone ext 2# 3x10 2# 3x10 2# 3x10 3# 3x10 3# 3x10  1# 3x10 2# 3x10 2# 3x10 2# 3x10   Prone row 2# 3x10 2# 3x10 2# 3x10 3# 3x10 3# 3x10  1# 3x10 2# 3x10 2# 3x10 2# 3x10   tband ER iso step outs GTB x15 GTB x15 nv Thru range GTB 2x10 Thru range GTB 3x10  GTB x15 nv OTB x15 nv   tband ext iso step back    Seated thru range OTB 2x10   Peach x15 nv nv Peach x15                                          Ther Ex             pendulums             R shoulder PROM DS DS DS DS DS  DS DS DS DS   Corner pec stretch 20"x5 20"x5 nv Ther Activity                                       Gait Training                                       Modalities

## 2022-02-15 NOTE — PROGRESS NOTES
Daily Note     Today's date: 2/15/2022  Patient name: Deforest Bamberger  : 1950  MRN: [id number]  Referring provider: Gt Epperson MD  Dx:   Encounter Diagnosis     ICD-10-CM    1  Chronic low back pain, unspecified back pain laterality, unspecified whether sciatica present  M54 50     G89 29                   Subjective: Pt states his low back is still painful with standing and walking, improves with sitting  Objective: See treatment diary below      Assessment: Sx's improve with standing lumbar flexion, but return on standing  No change with repeated lumbar flex in standing  However in sitting, pt had reduction in sx's with repeated lumbar flex  Advised pt to perform seated lumbar flexion x10 every waking hour  Pt demonstrates good performance of posterior pelvic tilts in supine, but had difficulty performing this in standing against wall  He was able to perform a TA contraction, but required max cues and demonstration to posteriorly tilt the pelvis and would benefit from further practice  Plan: Continue per plan of care  Progress treatment as tolerated         Precautions: HTN, hx of back surgery     FOTO = 37/50       Manuals   2               Lumbar flex /opening mobs bilat   DS    opening - DS bilat               Hip distraction with belt                        s/l manual post pelvic tilt with lumbar flexion b/l   DS  DS  DS               breaking bread stretch bilat     DS                 Neuro Re-Ed                       LLE sciatic n glides    x10                   Post pelvic tilts    5" 3x10  5" 3x10  5"3x10               glute set with hip ext at edge of dmitry   1xF bilat                   prone femoral n glide, contra LE off mat      1x15 b/l                  KB sit to stand      6# x10                  posterior pelvic tilts in standing        3x10                                       Ther Ex                       key pose stretch with PT overpressure x10                     repeated flexion in standing    3"x15  3"x15  3"x15               Manual hip flexor stretch   30"x4 b/l 30"x4 b/l 30"x4 b/l               Seated repeated flex       5"x10                b/l knee to chest stretch   10"x10  10"x10  10"x10                pball roll out stretch                                                                       Ther Activity                                                                       Gait Training                                                                       Modalities

## 2022-02-16 ENCOUNTER — APPOINTMENT (OUTPATIENT)
Dept: PHYSICAL THERAPY | Facility: REHABILITATION | Age: 72
End: 2022-02-16
Payer: COMMERCIAL

## 2022-02-17 NOTE — PROGRESS NOTES
Daily Note     Today's date: 2022  Patient name: Orquidea Morton  : 1950  MRN: [id number]  Referring provider: Breanna Parish MD  Dx:   Encounter Diagnosis     ICD-10-CM    1  Chronic low back pain, unspecified back pain laterality, unspecified whether sciatica present  M54 50     G89 29        Start Time: 0807          Subjective: Pt admits he has not really been doing his HEP  Has been doing seated lumbar flexion though  Objective: See treatment diary below      Assessment: Pt had difficulty achieving posterior pelvic tilts in standing, but with cued and increased repetition he was able to perform properly  Sx's resolve with laying/sitting, but return with standing  His sx's did reduce in standing once he was able to properly achieve a posterior pelvic tilt  Pt will benefit from continued skilled outpatient PT to improve strength, to address therapy goals, to reduce pain, and improve function  Plan: Continue per plan of care  Progress treatment as tolerated         Precautions: HTN, hx of back surgery     FOTO = 37/50       Manuals              Lumbar flex /opening mobs bilat   DS    opening - DS bilat  opening - DS bilat             Hip distraction with belt                        s/l manual post pelvic tilt with lumbar flexion b/l   DS  DS  DS  DS             breaking bread stretch bilat     DS                 Neuro Re-Ed                       LLE sciatic n glides    x10                   Post pelvic tilts    5" 3x10  5" 3x10  5"3x10  5"x20             glute set with hip ext at edge of dmitry   1xF bilat                   prone femoral n glide, contra LE off mat      1x15 b/l                  KB sit to stand      6# x10                  posterior pelvic tilts in standing        3x10  2xF                                     Ther Ex                       key pose stretch with PT overpressure x10                     repeated flexion in standing    3"x15  3"x15  3"x15               Manual hip flexor stretch   30"x4 b/l 30"x4 b/l 30"x4 b/l  30"x4 ea             Seated repeated flex       5"x10  2x15              b/l knee to chest stretch   10"x10  10"x10  10"x10  10"x10              pball roll out stretch                                                                       Ther Activity                                                                       Gait Training                                                                       Modalities

## 2022-02-18 ENCOUNTER — OFFICE VISIT (OUTPATIENT)
Dept: PHYSICAL THERAPY | Facility: REHABILITATION | Age: 72
End: 2022-02-18
Payer: COMMERCIAL

## 2022-02-18 ENCOUNTER — APPOINTMENT (OUTPATIENT)
Dept: PHYSICAL THERAPY | Facility: REHABILITATION | Age: 72
End: 2022-02-18
Payer: COMMERCIAL

## 2022-02-18 DIAGNOSIS — M54.50 CHRONIC LOW BACK PAIN, UNSPECIFIED BACK PAIN LATERALITY, UNSPECIFIED WHETHER SCIATICA PRESENT: Primary | ICD-10-CM

## 2022-02-18 DIAGNOSIS — G89.29 CHRONIC LOW BACK PAIN, UNSPECIFIED BACK PAIN LATERALITY, UNSPECIFIED WHETHER SCIATICA PRESENT: Primary | ICD-10-CM

## 2022-02-18 PROCEDURE — 97110 THERAPEUTIC EXERCISES: CPT | Performed by: PHYSICAL THERAPIST

## 2022-02-18 PROCEDURE — 97140 MANUAL THERAPY 1/> REGIONS: CPT | Performed by: PHYSICAL THERAPIST

## 2022-02-18 PROCEDURE — 97112 NEUROMUSCULAR REEDUCATION: CPT | Performed by: PHYSICAL THERAPIST

## 2022-02-21 NOTE — PROGRESS NOTES
Daily Note /discharge summar    Today's date: 2022  Patient name: Ceferino Apple  : 1950  MRN: [id number]  Referring provider: Elmira Almeida  Dx:   Encounter Diagnosis     ICD-10-CM    1  Tear of right supraspinatus tendon  M75 101    2  Internal derangement of shoulder, right  M24 811        Start Time: 0807  Stop Time: 0840  Total time in clinic (min): 33 minutes    Subjective: Pt reports his shoulder feels 75% better overall  Pain  Current pain ratin  At best pain ratin  At worst pain ratin (9 at eval)  Location: R shoulder    Objective: See treatment diary below  Objective     MMT   AROM   PROM     Shoulder L R L R L R   flexion 5 4+ with pain 155 150 with painful arc      abduction 5 5 with pain 140 120       ER 5 5 at side without pain 80 60 with pain       IR 5 5 with mild pain 50 with pain 46 with pain       supraspinatus   5 with pain                 R shoulder special tests  Painful arc (+)  Drop arm (-)  [place] (+)  Supraspinatus (+)  Infraspinatus (+)      Assessment: Pt presents today for reassessment of his R shoulder  At this time the pt has met all therapy goals and feels 75% improvement of sx's  R shoulder AROM has improved, RUE strength has improved, and max pain level has reduced  Pt still has pain that disrupts sleep and work duties, and shoulder ROM is still limited and painful  However, the pt is pleased with progress and independent in HEP  Pt should continue to improve on own with home program  Pt thanked therapist and feels ready for discharge from therapy  Goals  Short term goals: to be met in 6 weeks  Pt will report at least a 25% reduction in subjective pain complaints/symptoms to manage ADLs with reduced pain  MET  Pt will improve shoulder AROM at 5-10 degrees in all deficient planes to better be able to perform ADLs, household chores, and work duties   MET  Pt will report an improvement in max pain level by at least 2 points (to a 7/10 or less) on the numeric pain scale indicating a clinically significant change and overall improvement in pain  MET  Pt will achieve an improvement in FOTO score indicating an improvement in pain and function  MET  Pt will be able to sleep through the night without waking secondary to pain for improved quality of life  UNMET  Pt independent with initial HEP, rationale, technique and frequency, for ROM and pain control  MET    Long term goals: to be met in 12 weeks  Pt will report at least a 75% reduction in subjective pain complaints/symptoms to manage ADLs with reduced pain  MET  Pt will have WFL shoulder ROM with 3/10 pain or less to be able to perform ADLs and work duties  UNMET  Pt will improve FOTO score to better then expected outcome indicating an overall improvement in pain and function  MET  Improve shoulder/scapular strength to >4/5 for improved joint protection and ease over lifting, reaching, and overhead activities  MET  Pt will be able to perform work duties without an increase in pain   UNMET  Pt will be independent in final HEP for transition to maintenance program MET  Achieve pts therapy goal: get rid of the pain and avoid surgery UNMET      Plan: Discharge from therapy     Precautions: HTN, hx of back surgery        Manuals 1/24 1/27 2/1 2/8 2/15 2/22 11/28 11/30 12/3 12/14   LAD with oscill      DS DS DS DS DS         RE - DS       Med nerve glides DS 2x15 DS 2x15 DS 2x15 DS 2x15 DS 2x15        Inf GHJ mob DS Gr IV post + inf DS Gr IV post + inf DS Gr IV post + inf DS Gr IV post + inf DS Gr IV post + inf DS Gr IV post + inf DS Gr IV post + inf DS Gr IV post + inf DS Gr IV post + inf DS Gr IV post + inf   Neuro Re-Ed             S/l ER 2# 3x10 2# 3x10 2# 3x10 3# 3x10 3# 3x10 3# 3x10 1# 3x10 2# 3x10 2# 3x10 2# 3x10   Prone ext 2# 3x10 2# 3x10 2# 3x10 3# 3x10 3# 3x10 3# 3x10 1# 3x10 2# 3x10 2# 3x10 2# 3x10   Prone row 2# 3x10 2# 3x10 2# 3x10 3# 3x10 3# 3x10  1# 3x10 2# 3x10 2# 3x10 2# 3x10   tband ER iso step outs GTB x15 GTB x15 nv Thru range GTB 2x10 Thru range GTB 3x10  GTB x15 nv OTB x15 nv   tband ext iso step back    Seated thru range OTB 2x10   Peach x15 nv nv Peach x15                                          Ther Ex             pendulums             R shoulder PROM DS DS DS DS DS DS DS DS DS DS   Corner pec stretch 20"x5 20"x5 nv                                                                           Ther Activity                                       Gait Training                                       Modalities

## 2022-02-22 ENCOUNTER — OFFICE VISIT (OUTPATIENT)
Dept: PHYSICAL THERAPY | Facility: REHABILITATION | Age: 72
End: 2022-02-22
Payer: COMMERCIAL

## 2022-02-22 DIAGNOSIS — M24.811 INTERNAL DERANGEMENT OF SHOULDER, RIGHT: ICD-10-CM

## 2022-02-22 DIAGNOSIS — M75.101 TEAR OF RIGHT SUPRASPINATUS TENDON: Primary | ICD-10-CM

## 2022-02-22 PROCEDURE — 97112 NEUROMUSCULAR REEDUCATION: CPT | Performed by: PHYSICAL THERAPIST

## 2022-02-22 PROCEDURE — 97140 MANUAL THERAPY 1/> REGIONS: CPT | Performed by: PHYSICAL THERAPIST

## 2022-02-22 PROCEDURE — 97110 THERAPEUTIC EXERCISES: CPT | Performed by: PHYSICAL THERAPIST

## 2022-02-23 ENCOUNTER — APPOINTMENT (OUTPATIENT)
Dept: PHYSICAL THERAPY | Facility: REHABILITATION | Age: 72
End: 2022-02-23
Payer: COMMERCIAL

## 2022-02-23 NOTE — PROGRESS NOTES
Daily Note     Today's date: 3/2/2022  Patient name: Charlie Low  : 1950  MRN: [id number]  Referring provider: Natty Hernandez MD  Dx:   Encounter Diagnosis     ICD-10-CM    1  Chronic low back pain, unspecified back pain laterality, unspecified whether sciatica present  M54 50     G89 29        Start Time: 0802  Stop Time: 0840  Total time in clinic (min): 38 minutes    Subjective: Pt offers no new complaints  Objective: See treatment diary below      Assessment: Pt demonstrated improved ability to perform posterior pelvic tilt in standing today, but would still benefit from further practice  Pt will benefit from continued skilled outpatient PT to improve strength, to address therapy goals, to reduce pain, and improve function  Plan: Continue per plan of care  Progress treatment as tolerated         Precautions: HTN, hx of back surgery     FOTO = 37/50       Manuals            Lumbar flex /opening mobs bilat   DS    opening - DS bilat  opening - DS bilat  opening - DS bilat           Hip distraction with belt                        s/l manual post pelvic tilt with lumbar flexion b/l   DS  DS  DS  DS  DS           breaking bread stretch bilat     DS                 Neuro Re-Ed                       LLE sciatic n glides    x10                   Post pelvic tilts    5" 3x10  5" 3x10  5"3x10  5"x20  5"x15           glute set with hip ext at edge of dmityr   1xF bilat                   prone femoral n glide, contra LE off mat      1x15 b/l                  KB sit to stand      6# x10                  posterior pelvic tilts in standing        3x10  2xF  x30                                   Ther Ex                       key pose stretch with PT overpressure x10                     repeated flexion in standing    3"x15  3"x15  3"x15               Manual hip flexor stretch   30"x4 b/l 30"x4 b/l 30"x4 b/l  30"x4 ea   30"x4 ea + hams           Seated repeated flex       5"x10  2x15  2x20            b/l knee to chest stretch   10"x10  10"x10  10"x10  10"x10  x2min            pball roll out stretch                        pt edu           5'                                   Ther Activity                                                                       Gait Training                                                                       Modalities

## 2022-02-24 ENCOUNTER — OFFICE VISIT (OUTPATIENT)
Dept: PHYSICAL THERAPY | Facility: REHABILITATION | Age: 72
End: 2022-02-24
Payer: COMMERCIAL

## 2022-02-24 DIAGNOSIS — G89.29 CHRONIC LOW BACK PAIN, UNSPECIFIED BACK PAIN LATERALITY, UNSPECIFIED WHETHER SCIATICA PRESENT: Primary | ICD-10-CM

## 2022-02-24 DIAGNOSIS — M54.50 CHRONIC LOW BACK PAIN, UNSPECIFIED BACK PAIN LATERALITY, UNSPECIFIED WHETHER SCIATICA PRESENT: Primary | ICD-10-CM

## 2022-02-24 PROCEDURE — 97140 MANUAL THERAPY 1/> REGIONS: CPT | Performed by: PHYSICAL THERAPIST

## 2022-02-24 PROCEDURE — 97110 THERAPEUTIC EXERCISES: CPT | Performed by: PHYSICAL THERAPIST

## 2022-02-24 PROCEDURE — 97112 NEUROMUSCULAR REEDUCATION: CPT | Performed by: PHYSICAL THERAPIST

## 2022-02-25 ENCOUNTER — APPOINTMENT (OUTPATIENT)
Dept: PHYSICAL THERAPY | Facility: REHABILITATION | Age: 72
End: 2022-02-25
Payer: COMMERCIAL

## 2022-03-02 ENCOUNTER — OFFICE VISIT (OUTPATIENT)
Dept: PHYSICAL THERAPY | Facility: REHABILITATION | Age: 72
End: 2022-03-02
Payer: COMMERCIAL

## 2022-03-02 DIAGNOSIS — M54.50 CHRONIC LOW BACK PAIN, UNSPECIFIED BACK PAIN LATERALITY, UNSPECIFIED WHETHER SCIATICA PRESENT: Primary | ICD-10-CM

## 2022-03-02 DIAGNOSIS — G89.29 CHRONIC LOW BACK PAIN, UNSPECIFIED BACK PAIN LATERALITY, UNSPECIFIED WHETHER SCIATICA PRESENT: Primary | ICD-10-CM

## 2022-03-02 PROCEDURE — 97140 MANUAL THERAPY 1/> REGIONS: CPT | Performed by: PHYSICAL THERAPIST

## 2022-03-02 PROCEDURE — 97110 THERAPEUTIC EXERCISES: CPT | Performed by: PHYSICAL THERAPIST

## 2022-03-02 NOTE — PROGRESS NOTES
Daily Note     Today's date: 3/2/2022  Patient name: Maxi Desai  : 1950  MRN: [id number]  Referring provider: Ada Morillo MD  Dx:   Encounter Diagnosis     ICD-10-CM    1  Chronic low back pain, unspecified back pain laterality, unspecified whether sciatica present  M54 50     G89 29        Start Time: 0800  Stop Time: 0845  Total time in clinic (min): 45 minutes    Subjective: Pt reports no new changes  Objective: See treatment diary below      Assessment: Trialed cupping to lumbar paraspinals to address increased paraspinal tone  Pt responded well, but noted return of low back pain upon walking for ~30"  Planning for discharge next visit, then pt is going to follow up with his PCP to discuss potential MRI  Plan: Potential discharge next visit       Precautions: HTN, hx of back surgery    FOTO    = 37/50  3/2 =          Manuals   2  2  3         Lumbar flex /opening mobs bilat   DS    opening - DS bilat  opening - DS bilat  opening - DS bilat opening - DS bilat         Hip distraction with belt                        s/l manual post pelvic tilt with lumbar flexion b/l   DS  DS  DS  DS  DS  DS         breaking bread stretch bilat     DS        cupping - rock pods lumbar paraspinals         Neuro Re-Ed                       LLE sciatic n glides    x10                   Post pelvic tilts    5" 3x10  5" 3x10  5"3x10  5"x20  5"x15  2min         glute set with hip ext at edge of dmitry   1xF bilat                   prone femoral n glide, contra LE off mat      1x15 b/l                  KB sit to stand      6# x10                 posterior pelvic tilts in standing        3x10  2xF  x30                                   Ther Ex                       VG       L7 5'      key pose stretch with PT overpressure x10                     repeated flexion in standing    3"x15  3"x15  3"x15               Manual hip flexor stretch   30"x4 b/l 30"x4 b/l 30"x4 b/l  30"x4 ea  30"x4 ea + hams  30"x4 ea + hams         Seated repeated flex       5"x10  2x15  2x20            b/l knee to chest stretch   10"x10  10"x10  10"x10  10"x10  x2min  x2min          pball roll out stretch                        pt edu           5'            key pose stretch C,L,R             10"x5 ea         Ther Activity                                                                       Gait Training                                                                       Modalities

## 2022-03-03 ENCOUNTER — OFFICE VISIT (OUTPATIENT)
Dept: PHYSICAL THERAPY | Facility: REHABILITATION | Age: 72
End: 2022-03-03
Payer: COMMERCIAL

## 2022-03-03 DIAGNOSIS — M54.50 CHRONIC LOW BACK PAIN, UNSPECIFIED BACK PAIN LATERALITY, UNSPECIFIED WHETHER SCIATICA PRESENT: Primary | ICD-10-CM

## 2022-03-03 DIAGNOSIS — G89.29 CHRONIC LOW BACK PAIN, UNSPECIFIED BACK PAIN LATERALITY, UNSPECIFIED WHETHER SCIATICA PRESENT: Primary | ICD-10-CM

## 2022-03-03 PROCEDURE — 97110 THERAPEUTIC EXERCISES: CPT | Performed by: PHYSICAL THERAPIST

## 2022-03-03 PROCEDURE — 97140 MANUAL THERAPY 1/> REGIONS: CPT | Performed by: PHYSICAL THERAPIST

## 2022-05-06 ENCOUNTER — TELEPHONE (OUTPATIENT)
Dept: FAMILY MEDICINE CLINIC | Facility: CLINIC | Age: 72
End: 2022-05-06

## 2022-05-06 NOTE — TELEPHONE ENCOUNTER
FYI: Patient called today for phone # for central scheduling to schedule his MRI lumbar spine wo contrast, as his previously scheduled MRI needed to be cancelled due to insurance P A  denial (per encounter on 12/29/21)  Pt has now completed the requirements of Physical Therapy, so he wanted to f/u with Dr Grant Cruz to make aware he will now be moving forward with MRI  Also reminded pt to complete his repeat BW, in which I both emailed and mailed pt a copy of   Pt also scheduled for f/u OV in July, per his request

## 2022-05-10 LAB
ALBUMIN SERPL-MCNC: 4.3 G/DL (ref 3.6–5.1)
ALBUMIN/GLOB SERPL: 1.7 (CALC) (ref 1–2.5)
ALP SERPL-CCNC: 86 U/L (ref 35–144)
ALT SERPL-CCNC: 22 U/L (ref 9–46)
AST SERPL-CCNC: 15 U/L (ref 10–35)
BASOPHILS # BLD AUTO: 22 CELLS/UL (ref 0–200)
BASOPHILS NFR BLD AUTO: 0.5 %
BILIRUB SERPL-MCNC: 0.7 MG/DL (ref 0.2–1.2)
BUN SERPL-MCNC: 10 MG/DL (ref 7–25)
BUN/CREAT SERPL: ABNORMAL (CALC) (ref 6–22)
CALCIUM SERPL-MCNC: 9.4 MG/DL (ref 8.6–10.3)
CHLORIDE SERPL-SCNC: 104 MMOL/L (ref 98–110)
CO2 SERPL-SCNC: 32 MMOL/L (ref 20–32)
CREAT SERPL-MCNC: 0.74 MG/DL (ref 0.7–1.18)
EOSINOPHIL # BLD AUTO: 40 CELLS/UL (ref 15–500)
EOSINOPHIL NFR BLD AUTO: 0.9 %
ERYTHROCYTE [DISTWIDTH] IN BLOOD BY AUTOMATED COUNT: 12.9 % (ref 11–15)
GLOBULIN SER CALC-MCNC: 2.6 G/DL (CALC) (ref 1.9–3.7)
GLUCOSE SERPL-MCNC: 144 MG/DL (ref 65–99)
HBA1C MFR BLD: 6.2 % OF TOTAL HGB
HCT VFR BLD AUTO: 37.6 % (ref 38.5–50)
HGB BLD-MCNC: 12.9 G/DL (ref 13.2–17.1)
LYMPHOCYTES # BLD AUTO: 1575 CELLS/UL (ref 850–3900)
LYMPHOCYTES NFR BLD AUTO: 35.8 %
MCH RBC QN AUTO: 31.6 PG (ref 27–33)
MCHC RBC AUTO-ENTMCNC: 34.3 G/DL (ref 32–36)
MCV RBC AUTO: 92.2 FL (ref 80–100)
MONOCYTES # BLD AUTO: 440 CELLS/UL (ref 200–950)
MONOCYTES NFR BLD AUTO: 10 %
NEUTROPHILS # BLD AUTO: 2323 CELLS/UL (ref 1500–7800)
NEUTROPHILS NFR BLD AUTO: 52.8 %
PLATELET # BLD AUTO: 148 THOUSAND/UL (ref 140–400)
PMV BLD REES-ECKER: 10.9 FL (ref 7.5–12.5)
POTASSIUM SERPL-SCNC: 4.5 MMOL/L (ref 3.5–5.3)
PROT SERPL-MCNC: 6.9 G/DL (ref 6.1–8.1)
RBC # BLD AUTO: 4.08 MILLION/UL (ref 4.2–5.8)
SL AMB EGFR AFRICAN AMERICAN: 105 ML/MIN/1.73M2
SL AMB EGFR NON AFRICAN AMERICAN: 91 ML/MIN/1.73M2
SODIUM SERPL-SCNC: 141 MMOL/L (ref 135–146)
WBC # BLD AUTO: 4.4 THOUSAND/UL (ref 3.8–10.8)

## 2022-05-17 ENCOUNTER — TELEPHONE (OUTPATIENT)
Dept: FAMILY MEDICINE CLINIC | Facility: CLINIC | Age: 72
End: 2022-05-17

## 2022-05-17 DIAGNOSIS — D64.9 ANEMIA, UNSPECIFIED TYPE: Primary | ICD-10-CM

## 2022-05-18 NOTE — TELEPHONE ENCOUNTER
No way to contact pt as his cell and home phone numbers are no longer working  This task and blood work script mailed to pt  Advised to contact the office with any questions at [phone]

## 2022-05-18 NOTE — TELEPHONE ENCOUNTER
Please contact patient  I reviewed his blood work  · Fasting blood glucose was slightly elevated at 144, but overall test for diabetes, hemoglobin A1c was good at 6 2%  · Slightly decreased hemoglobin at 12 9  · Please ask patient to repeat nonfasting CBC, iron panel and vitamin B12 prior to his next office visit in July  Orders placed    Thank you

## 2022-07-11 ENCOUNTER — TELEPHONE (OUTPATIENT)
Dept: FAMILY MEDICINE CLINIC | Facility: CLINIC | Age: 72
End: 2022-07-11

## 2022-07-11 NOTE — TELEPHONE ENCOUNTER
Patient called for update on MRI  Stated he completed PT  Patient no showed to 7/7/22 OV  Attempted to call patient back to r/s appointment  Home number not working and cell number ( number he called office from) not accepting incoming calls  Patient needs re-evaluation in office before new MRI order is placed

## 2022-07-28 DIAGNOSIS — E11.69 DIABETES MELLITUS TYPE 2 IN OBESE (HCC): ICD-10-CM

## 2022-07-28 DIAGNOSIS — E66.9 DIABETES MELLITUS TYPE 2 IN OBESE (HCC): ICD-10-CM

## 2022-07-28 RX ORDER — LANCETS
EACH MISCELLANEOUS
Qty: 300 EACH | Refills: 3 | Status: SHIPPED | OUTPATIENT
Start: 2022-07-28

## 2022-08-05 ENCOUNTER — OFFICE VISIT (OUTPATIENT)
Dept: FAMILY MEDICINE CLINIC | Facility: CLINIC | Age: 72
End: 2022-08-05
Payer: COMMERCIAL

## 2022-08-05 VITALS
HEIGHT: 66 IN | OXYGEN SATURATION: 98 % | WEIGHT: 193.6 LBS | HEART RATE: 63 BPM | BODY MASS INDEX: 31.12 KG/M2 | RESPIRATION RATE: 16 BRPM | DIASTOLIC BLOOD PRESSURE: 70 MMHG | SYSTOLIC BLOOD PRESSURE: 120 MMHG | TEMPERATURE: 98 F

## 2022-08-05 DIAGNOSIS — I10 BENIGN ESSENTIAL HYPERTENSION: ICD-10-CM

## 2022-08-05 DIAGNOSIS — E78.2 MIXED HYPERLIPIDEMIA: ICD-10-CM

## 2022-08-05 DIAGNOSIS — E66.9 DIABETES MELLITUS TYPE 2 IN OBESE (HCC): ICD-10-CM

## 2022-08-05 DIAGNOSIS — D57.3 SICKLE CELL TRAIT (HCC): ICD-10-CM

## 2022-08-05 DIAGNOSIS — M48.00 SPINAL STENOSIS, UNSPECIFIED SPINAL REGION: Primary | ICD-10-CM

## 2022-08-05 DIAGNOSIS — D64.9 ANEMIA, UNSPECIFIED TYPE: ICD-10-CM

## 2022-08-05 DIAGNOSIS — E11.69 DIABETES MELLITUS TYPE 2 IN OBESE (HCC): ICD-10-CM

## 2022-08-05 LAB
BASOPHILS # BLD AUTO: 19 CELLS/UL (ref 0–200)
BASOPHILS NFR BLD AUTO: 0.5 %
BUN SERPL-MCNC: 13 MG/DL (ref 7–25)
BUN/CREAT SERPL: ABNORMAL (CALC) (ref 6–22)
CALCIUM SERPL-MCNC: 9.5 MG/DL (ref 8.6–10.3)
CHLORIDE SERPL-SCNC: 104 MMOL/L (ref 98–110)
CHOLEST SERPL-MCNC: 131 MG/DL
CHOLEST/HDLC SERPL: 2.7 (CALC)
CO2 SERPL-SCNC: 28 MMOL/L (ref 20–32)
CREAT SERPL-MCNC: 0.92 MG/DL (ref 0.7–1.28)
EOSINOPHIL # BLD AUTO: 49 CELLS/UL (ref 15–500)
EOSINOPHIL NFR BLD AUTO: 1.3 %
ERYTHROCYTE [DISTWIDTH] IN BLOOD BY AUTOMATED COUNT: 13.5 % (ref 11–15)
FERRITIN SERPL-MCNC: 112 NG/ML (ref 24–380)
GFR/BSA.PRED SERPLBLD CYS-BASED-ARV: 87 ML/MIN/1.73M2
GLUCOSE SERPL-MCNC: 151 MG/DL (ref 65–99)
HBA1C MFR BLD: 6.5 % OF TOTAL HGB
HCT VFR BLD AUTO: 39.6 % (ref 38.5–50)
HDLC SERPL-MCNC: 49 MG/DL
HGB BLD-MCNC: 13.5 G/DL (ref 13.2–17.1)
IRON SATN MFR SERPL: 48 % (CALC) (ref 20–48)
IRON SERPL-MCNC: 153 MCG/DL (ref 50–180)
LDLC SERPL CALC-MCNC: 63 MG/DL (CALC)
LYMPHOCYTES # BLD AUTO: 1379 CELLS/UL (ref 850–3900)
LYMPHOCYTES NFR BLD AUTO: 36.3 %
MCH RBC QN AUTO: 32.2 PG (ref 27–33)
MCHC RBC AUTO-ENTMCNC: 34.1 G/DL (ref 32–36)
MCV RBC AUTO: 94.5 FL (ref 80–100)
MONOCYTES # BLD AUTO: 369 CELLS/UL (ref 200–950)
MONOCYTES NFR BLD AUTO: 9.7 %
NEUTROPHILS # BLD AUTO: 1984 CELLS/UL (ref 1500–7800)
NEUTROPHILS NFR BLD AUTO: 52.2 %
NONHDLC SERPL-MCNC: 82 MG/DL (CALC)
PLATELET # BLD AUTO: 132 THOUSAND/UL (ref 140–400)
PMV BLD REES-ECKER: 10.9 FL (ref 7.5–12.5)
POTASSIUM SERPL-SCNC: 4.6 MMOL/L (ref 3.5–5.3)
RBC # BLD AUTO: 4.19 MILLION/UL (ref 4.2–5.8)
SODIUM SERPL-SCNC: 140 MMOL/L (ref 135–146)
TIBC SERPL-MCNC: 316 MCG/DL (CALC) (ref 250–425)
TRIGL SERPL-MCNC: 100 MG/DL
TSH SERPL-ACNC: 2.42 MIU/L (ref 0.4–4.5)
VIT B12 SERPL-MCNC: 349 PG/ML (ref 200–1100)
WBC # BLD AUTO: 3.8 THOUSAND/UL (ref 3.8–10.8)

## 2022-08-05 PROCEDURE — 3078F DIAST BP <80 MM HG: CPT | Performed by: FAMILY MEDICINE

## 2022-08-05 PROCEDURE — 3725F SCREEN DEPRESSION PERFORMED: CPT | Performed by: FAMILY MEDICINE

## 2022-08-05 PROCEDURE — 3044F HG A1C LEVEL LT 7.0%: CPT | Performed by: FAMILY MEDICINE

## 2022-08-05 PROCEDURE — 1160F RVW MEDS BY RX/DR IN RCRD: CPT | Performed by: FAMILY MEDICINE

## 2022-08-05 PROCEDURE — 3074F SYST BP LT 130 MM HG: CPT | Performed by: FAMILY MEDICINE

## 2022-08-05 PROCEDURE — 99214 OFFICE O/P EST MOD 30 MIN: CPT | Performed by: FAMILY MEDICINE

## 2022-08-05 NOTE — PROGRESS NOTES
FAMILY PRACTICE OFFICE VISIT       NAME: Marlon Lechuga  AGE: 74 y o  SEX: male       : 1950        MRN: [id number]        Assessment and Plan     1  Spinal stenosis, unspecified spinal region  Assessment & Plan:  Back surgery -  [facility] -  L3-L4 lateral  discectomy in summer of 2012 - [place] [facility]        2  Diabetes mellitus type 2 in [facility])  Assessment & Plan:    Lab Results   Component Value Date    HGBA1C 6 5 (H) 2022     Diet controlled  Proceed with blood work    Orders:  -     Basic metabolic panel; Future  -     Hemoglobin A1C; Future  -     Basic metabolic panel  -     Hemoglobin A1C    3  Mixed hyperlipidemia  Assessment & Plan:  Crestor 10 mg daily  Monitor lipid panel and CMP    Orders:  -     TSH, 3rd generation; Future  -     TSH, 3rd generation  -     Lipid Panel with Direct LDL reflex; Future  -     Lipid Panel with Direct LDL reflex    4  Anemia, unspecified type  Assessment & Plan:  Proceed with blood work  History of mild anemia, patient with history of sickle cell trait      5  Sickle cell trait ([facility] [address] )  Assessment & Plan:  Continue monitoring CBC      6  Benign essential hypertension  Assessment & Plan:  Blood pressure is well controlled  Continue amlodipine and losartan      Patient presents for follow-up  Persistent low back pain  Left sciatica  Known spinal stenosis  Patient completed physical therapy with no significant improvement    Printing MRI lumbar spine on Monday,   Patient declined MRI study with contrast due to previous adverse reaction to IV contrast that was used during nuclear stress test a few years ago  We will contact Radiology colleagues regarding this concern  If patient is a suitable candidate for IV contrast he will definitely benefit due to history of previous low back surgery in   I will be in touch with patient regarding results of MRI results and further directions pending MRI results      He prefers to avoid surgical path but is agreeable to surgical consultation if warranted  Follow-up pending lab work and MRI results  I will contact patient via my chart or over the phone  There are no Patient Instructions on file for this visit  Return if symptoms worsen or fail to improve  Discussed with the patient and all questioned fully answered  He will call me if any problems arise  M*Modal software was used to dictate this note  It may contain errors with dictating incorrect words/spelling  Please contact provider directly with any questions  Chief Complaint     Chief Complaint   Patient presents with    Follow-up     Routine F/U Care: DM2, NICHOLE, HTN, HLD    Re-Evaluation for L-Spine MRI       History of Present Illness      Patient is here for follow up of chronic low back pain   MRI LS spine is scheduled BUT without contrast   Patient is reporting h/o adverse reaction to the contrast while performing NST a few years ago and has declined study with    Persistent low back pain, left LE sciatica contrast for that reason  Significant low back stiffness and pain, worse after prolonged sitting  Surgery was performed at [facility] 2012 by [address]          Review of Systems   Review of Systems   Constitutional: Negative  HENT: Negative  Eyes: Negative  Respiratory: Negative  Cardiovascular: Negative  Gastrointestinal: Negative  Genitourinary: Negative  Musculoskeletal: Positive for back pain  Neurological: Negative  Hematological: Negative  Psychiatric/Behavioral: Negative          Active Problem List     Patient Active Problem List   Diagnosis    Anemia    Arthralgia of knee, left    Benign enlargement of prostate    Benign essential hypertension    Complete left bundle branch block (LBBB)    Sickle cell trait ([address] )    Spinal stenosis    Mild obstructive sleep apnea    Hyperlipidemia    Diabetes mellitus type 2 in obese (HCC)    Medial epicondylitis of left elbow  Post traumatic adhesive capsulitis of both shoulders    Bilateral hearing loss    Internal derangement of shoulder, right    Tear of right supraspinatus tendon       Past Medical History:  Past Medical History:   Diagnosis Date    Acute biliary pancreatitis 10/18/2020    Anemia     mild anemia    Back pain     Heart burn     History of chest pain     Hypertension     Palpitations     Problems with hearing     Problems with swallowing     Sickle cell trait ([address] )     last assessed 10/31/14    Snoring     SOB (shortness of breath)     Spinal stenosis     ddd spinal stenosis    Swelling        Past Surgical History:  Past Surgical History:   Procedure Laterality Date    BACK SURGERY      lower back    COLONOSCOPY  01/2016    due 2026, Dr, Elicia Hodgkins  12/07       Family History:  Family History   Problem Relation Age of Onset    Diabetes Father     Sickle cell anemia Family     Diabetes Sister     Cancer Brother        Social History:  Social History     Socioeconomic History    Marital status: /Civil Union     Spouse name: Not on file    Number of children: Not on file    Years of education: Not on file    Highest education level: Not on file   Occupational History    Not on file   Tobacco Use    Smoking status: Never Smoker    Smokeless tobacco: Never Used   Vaping Use    Vaping Use: Never used   Substance and Sexual Activity    Alcohol use: No    Drug use: No    Sexual activity: Not on file   Other Topics Concern    Not on file   Social History Narrative    Not on file     Social Determinants of Health     Financial Resource Strain: Not on file   Food Insecurity: Not on file   Transportation Needs: Not on file   Physical Activity: Not on file   Stress: Not on file   Social Connections: Not on file   Intimate Partner Violence: Not on file   Housing Stability: Not on file         Objective     Vitals:    08/05/22 0835 08/05/22 0912   BP: 152/64 120/70   BP Location: Left arm Patient Position: Sitting    Cuff Size: Large    Pulse: 63    Resp: 16    Temp: 98 °F (36 7 °C)    TempSrc: Temporal    SpO2: 98%    Weight: 87 8 kg (193 lb 9 6 oz)    Height: 5' 6" (1 676 m)        Wt Readings from Last 3 Encounters:   08/05/22 87 8 kg (193 lb 9 6 oz)   11/18/21 90 3 kg (199 lb)   10/25/21 88 5 kg (195 lb)       Physical Exam  Vitals and nursing note reviewed  Constitutional:       General: He is not in acute distress  Appearance: Normal appearance  He is well-developed  He is not ill-appearing  HENT:      Head: Normocephalic and atraumatic  Eyes:      Conjunctiva/sclera: Conjunctivae normal    Neck:      Vascular: No carotid bruit  Cardiovascular:      Rate and Rhythm: Normal rate and regular rhythm  Heart sounds: Normal heart sounds  Pulmonary:      Effort: Pulmonary effort is normal       Breath sounds: Normal breath sounds  Abdominal:      General: Bowel sounds are normal  There is no distension or abdominal bruit  Musculoskeletal:         General: Normal range of motion  Cervical back: Neck supple  Neurological:      General: No focal deficit present  Mental Status: He is alert and oriented to person, place, and time  Cranial Nerves: No cranial nerve deficit     Psychiatric:         Mood and Affect: Mood normal          Behavior: Behavior normal           Pertinent Laboratory/Diagnostic Studies:    Lab Results   Component Value Date    WBC 3 8 08/05/2022    HGB 13 5 08/05/2022    HCT 39 6 08/05/2022    MCV 94 5 08/05/2022     (L) 08/05/2022       Lab Results   Component Value Date    TSH 2 42 08/05/2022       Lab Results   Component Value Date    CHOL 113 10/27/2017     Lab Results   Component Value Date    TRIG 100 08/05/2022     Lab Results   Component Value Date    HDL 49 08/05/2022     Lab Results   Component Value Date    LDLCALC 63 08/05/2022     Lab Results   Component Value Date    HGBA1C 6 5 (H) 08/05/2022     Lab Results   Component Value Date    SODIUM 140 08/05/2022    K 4 6 08/05/2022     08/05/2022    CO2 28 08/05/2022    AGAP 7 09/11/2018    BUN 13 08/05/2022    CREATININE 0 92 08/05/2022    GLUC 151 (H) 08/05/2022    CALCIUM 9 5 08/05/2022    AST 15 05/09/2022    ALT 22 05/09/2022    ALKPHOS 86 05/09/2022    PROT 7 3 10/27/2017    TP 6 9 05/09/2022    BILITOT 0 8 10/27/2017    TBILI 0 7 05/09/2022    EGFR 87 08/05/2022       Orders Placed This Encounter   Procedures    Basic metabolic panel    Hemoglobin A1C    TSH, 3rd generation    Lipid Panel with Direct LDL reflex       ALLERGIES:  Allergies   Allergen Reactions    Metformin     Levofloxacin Rash     Other reaction(s): Unknown Allergic Reaction       Current Medications     Current Outpatient Medications   Medication Sig Dispense Refill    amLODIPine (NORVASC) 10 mg tablet TAKE 1 TABLET BY MOUTH EVERY DAY 90 tablet 3    aspirin 81 mg chewable tablet Chew 1 tablet daily      Blood Glucose Monitoring Suppl (ONE TOUCH ULTRA 2) w/Device KIT Use as directed  0    Contour Next Test test strip TEST 3 TIMES DAILY 300 strip 3    losartan (COZAAR) 100 MG tablet Take 100 mg by mouth daily       methocarbamol (Robaxin-750) 750 mg tablet Take 1 tablet (750 mg total) by mouth daily at bedtime as needed for muscle spasms (Back pain) 90 tablet 1    Microlet Lancets MISC TEST 3 TIMES DAILY 300 each 3    nabumetone (RELAFEN) 750 mg tablet TAKE 1 TABLET BY MOUTH  TWICE DAILY AFTER FOOD AS  NEEDED FOR SHOULDER PAIN OR BACK PAIN 180 tablet 3    rosuvastatin (CRESTOR) 10 MG tablet Take 1 tablet (10 mg total) by mouth daily 90 tablet 3     No current facility-administered medications for this visit  There are no discontinued medications      Health Maintenance     Health Maintenance   Topic Date Due    Hepatitis C Screening  Never done    DTaP,Tdap,and Td Vaccines (1 - Tdap) Never done    Falls: Plan of Care  Never done    COVID-19 Vaccine (4 - Booster for [place]we Peter series) 03/19/2022    Influenza Vaccine (1) 09/01/2022    Annual Physical  09/03/2022    Diabetic Foot Exam  09/03/2022    BMI: Followup Plan  09/07/2022    Fall Risk  01/20/2023    HEMOGLOBIN A1C  02/05/2023    Depression Screening  08/05/2023    BMI: Adult  08/05/2023    DM Eye Exam  12/20/2023    Colorectal Cancer Screening  01/05/2026    Pneumococcal Vaccine: 65+ Years  Completed    HIB Vaccine  Aged Out    Hepatitis B Vaccine  Aged Out    IPV Vaccine  Aged Out    Hepatitis A Vaccine  Aged Out    Meningococcal ACWY Vaccine  Aged Out    HPV Vaccine  Aged Out       Immunization History   Administered Date(s) Administered    COVID-19 PFIZER VACCINE 0 3 ML IM 02/21/2021, 03/13/2021, 11/19/2021    INFLUENZA 09/20/2018    Influenza Split High Dose Preservative Free IM 10/14/2016, 10/27/2017    Influenza, high dose seasonal 0 7 mL 09/20/2018, 09/10/2019, 09/16/2020    Influenza, seasonal, injectable 10/12/2007, 09/25/2009, 01/17/2014, 10/22/2014    Pneumococcal Conjugate 13-Valent 10/14/2016    Pneumococcal Polysaccharide PPV23 04/26/2013       Desiree Garcias MD

## 2022-08-07 ENCOUNTER — TELEPHONE (OUTPATIENT)
Dept: FAMILY MEDICINE CLINIC | Facility: CLINIC | Age: 72
End: 2022-08-07

## 2022-08-07 NOTE — ASSESSMENT & PLAN NOTE
Lab Results   Component Value Date    HGBA1C 6 5 (H) 08/05/2022     Diet controlled    Proceed with blood work

## 2022-08-07 NOTE — TELEPHONE ENCOUNTER
Please contact [facility]'s MRI department or one of radiologists   Patient is scheduled for MRI of lumbar spine at [facility] MRI department at 3:00 p m on 8/8/22  I originally ordered this study with contrast,but patient has declined since he experienced adverse side effects to the contrast that was used during cardiac nuclear stress test     I would like to find out if this is indeed a contraindication OR other type of contrast  will be used for MRI and is it different from nuclear stress test contrast?    Please let me know ASAP so I can change the orders    Thank you

## 2022-08-08 ENCOUNTER — HOSPITAL ENCOUNTER (OUTPATIENT)
Dept: MRI IMAGING | Facility: HOSPITAL | Age: 72
Discharge: HOME | End: 2022-08-08
Payer: COMMERCIAL

## 2022-08-08 DIAGNOSIS — M48.00 SPINAL STENOSIS, UNSPECIFIED SPINAL REGION: Primary | ICD-10-CM

## 2022-08-08 DIAGNOSIS — M48.07 SPINAL STENOSIS OF LUMBOSACRAL REGION: ICD-10-CM

## 2022-08-08 DIAGNOSIS — G89.29 CHRONIC LOW BACK PAIN, UNSPECIFIED BACK PAIN LATERALITY, UNSPECIFIED WHETHER SCIATICA PRESENT: ICD-10-CM

## 2022-08-08 DIAGNOSIS — M54.50 CHRONIC LOW BACK PAIN, UNSPECIFIED BACK PAIN LATERALITY, UNSPECIFIED WHETHER SCIATICA PRESENT: ICD-10-CM

## 2022-08-08 PROCEDURE — 72148 MRI LUMBAR SPINE W/O DYE: CPT

## 2022-08-08 PROCEDURE — G1004 CDSM NDSC: HCPCS

## 2022-08-08 NOTE — TELEPHONE ENCOUNTER
Please contact patient  I spoke with [facility] Radiology  Contrast use for MRI and contrast use for nuclear stress test a 2 completely different contrast   Patient can have MRI with contrast today  I will place an order    Thank you

## 2022-08-11 DIAGNOSIS — M48.062 SPINAL STENOSIS OF LUMBAR REGION WITH NEUROGENIC CLAUDICATION: Primary | ICD-10-CM

## 2022-08-12 ENCOUNTER — TELEPHONE (OUTPATIENT)
Dept: FAMILY MEDICINE CLINIC | Facility: CLINIC | Age: 72
End: 2022-08-12

## 2022-08-12 DIAGNOSIS — E11.69 DIABETES MELLITUS TYPE 2 IN OBESE (HCC): Primary | Chronic | ICD-10-CM

## 2022-08-12 DIAGNOSIS — E66.9 DIABETES MELLITUS TYPE 2 IN OBESE (HCC): Primary | Chronic | ICD-10-CM

## 2022-08-14 DIAGNOSIS — E66.9 DIABETES MELLITUS TYPE 2 IN OBESE (HCC): ICD-10-CM

## 2022-08-14 DIAGNOSIS — E11.69 DIABETES MELLITUS TYPE 2 IN OBESE (HCC): ICD-10-CM

## 2022-08-14 RX ORDER — PERPHENAZINE 16 MG/1
TABLET, FILM COATED ORAL
Qty: 300 STRIP | Refills: 3 | Status: SHIPPED | OUTPATIENT
Start: 2022-08-14

## 2022-09-16 ENCOUNTER — OFFICE VISIT (OUTPATIENT)
Dept: NEUROSURGERY | Facility: CLINIC | Age: 72
End: 2022-09-16
Payer: COMMERCIAL

## 2022-09-16 VITALS
HEART RATE: 58 BPM | RESPIRATION RATE: 16 BRPM | HEIGHT: 66 IN | WEIGHT: 195 LBS | TEMPERATURE: 97.4 F | SYSTOLIC BLOOD PRESSURE: 140 MMHG | BODY MASS INDEX: 31.34 KG/M2 | DIASTOLIC BLOOD PRESSURE: 72 MMHG

## 2022-09-16 DIAGNOSIS — M48.062 SPINAL STENOSIS OF LUMBAR REGION WITH NEUROGENIC CLAUDICATION: ICD-10-CM

## 2022-09-16 PROCEDURE — 99203 OFFICE O/P NEW LOW 30 MIN: CPT | Performed by: PHYSICIAN ASSISTANT

## 2022-09-16 NOTE — PROGRESS NOTES
Neurosurgery Office Note  Geovanni Bell 74 y o  male MRN: [id number]      Assessment/Plan     Spinal stenosis  · Pt presents for consultation for low back pain  · Hx of left L3/4 MIS discectomy in 2012 at [facility]    · Imaging reviewed personally and by attending  Final results below discussed with pt in detail  · MRI lumbar spine wo 8/8/22: Spondylotic degenerative changes are seen diffusely most prominently at L2-3 where there is severe central and bilateral lateral recess stenosis as well as moderate to severe left foraminal narrowing  Thecal sac is compressed with nerve root stretched and redundant above this level  Spondylotic degenerative changes are seen at remaining levels including L3-4 where there is moderate to severe central and lateral recess stenosis  Plan  · Recommend pt continue PT as tolerated  Pt had PT 2021 to May 2022  Pt reports mild relief of sx with PT but reports sx returned when he stopped PT  Referral to PT provided  · Pt has not been to seen pain mgt or had any recent MARILYN, referral to pain mg t   · Dr Sandi Barreto discussed imaging findings with pt that shows multilevel degenerative changes    She also noted that some of pt's sx such as pain in bilateral groin/ L1 distribution does not correlate with imaging findings  She recommended that at this time pt continue to exhaust conservative measures including PT/pain mgt  · Should sx persist or worsen, pt develop new or red flag sx to contact neurosurgery as needed for further follow up  · Pt showed understanding and was in agreement with the plan  Diagnoses and all orders for this visit:    Spinal stenosis of lumbar region with neurogenic claudication  -     Ambulatory referral to Neurosurgery  -     Ambulatory Referral to Physical Therapy; Future  -     Ambulatory Referral to Pain Management;  Future  -     Cancel: XR lumbar sp/bending only min 2-3 v; Future        I spent 45 minutes with the patient today in which >50% of the time was spent counseling/coordination of care regarding diagnosis, imaging review, symptoms and treatment plan  CHIEF COMPLAINT    Chief Complaint   Patient presents with    Consult     spinal stenosis, worsening of chronic back pain, left sciatica       HISTORY    History of Present Illness     74y o  year old male     Patient is a 57-year-old male with h/o HLD, HTN, DM, and chronic back pain s/p left L3/4 MIS discectomy in 2012 at [facility] who presents for consultation for back pain with left radicular pain  Pt reports that he had prior surgery in 2012 due to his back pain and left leg radicular sx  Prior to surgery, he had left LE lateral radicular pain all the to his left foot  Pt reports that after his surgery he had improvement in the left leg radicular pain and sx  He reports that about 3 years ago his sx began to worsen again with back pain and left sided radicular pain  At present pt reports 9/10 back pain when standing or walking  He report that pain radiates to his bilateral groin  He also reports occasional pain in the left lateral/anterior thigh down to the left knee/anterior shin  He reports  weakness worse on the left than right LE  He also reports numbness/tingling in the left anterior knee and shin  He reports the pain does not go to the foot  Pt denies bowel or bladder incontinence  He denies use of assistive devices  He reports that the longer he walks, he cannot stand straight anymore and has to lean forward  Pt reports he had PT November 2021 to March 2022  He reports that he had mild relief that did not last for long  He denies seeing pain management or trying an MARILYN recently  He reports that prior to his surgery in 2012, he had multiple MARILYN  He takes Robaxin for pain  REVIEW OF SYSTEMS    Review of Systems   Constitutional: Negative  HENT: Negative  Eyes: Negative  Respiratory: Negative  Cardiovascular: Negative  Gastrointestinal: Negative  Endocrine: Negative  Genitourinary: Negative  Musculoskeletal: Positive for back pain (center LBP raditing to abdomin  at times left leg discomfort) and gait problem (unable to stand straight up due to back pain)  Allergic/Immunologic: Negative  Neurological: Positive for weakness (feels left leg is weaker than right ) and numbness (some N&T in left knee and shin at times)  Meds/Allergies     Current Outpatient Medications   Medication Sig Dispense Refill    amLODIPine (NORVASC) 10 mg tablet TAKE 1 TABLET BY MOUTH EVERY DAY 90 tablet 3    aspirin 81 mg chewable tablet Chew 1 tablet daily      losartan (COZAAR) 100 MG tablet Take 100 mg by mouth daily       rosuvastatin (CRESTOR) 10 MG tablet Take 1 tablet (10 mg total) by mouth daily 90 tablet 3    Contour Next Test test strip TEST 3 TIMES DAILY (Patient not taking: Reported on 9/16/2022) 300 strip 3    Microlet Lancets MISC TEST 3 TIMES DAILY (Patient not taking: Reported on 9/16/2022) 300 each 3    nabumetone (RELAFEN) 750 mg tablet TAKE 1 TABLET BY MOUTH  TWICE DAILY AFTER FOOD AS  NEEDED FOR SHOULDER PAIN OR BACK PAIN (Patient not taking: Reported on 9/16/2022) 180 tablet 3     No current facility-administered medications for this visit         Allergies   Allergen Reactions    Metformin     Levofloxacin Rash     Other reaction(s): Unknown Allergic Reaction       PAST HISTORY    Past Medical History:   Diagnosis Date    Acute biliary pancreatitis 10/18/2020    Anemia     mild anemia    Back pain     Heart burn     History of chest pain     Hypertension     Palpitations     Problems with hearing     Problems with swallowing     Sickle cell trait ([address] )     last assessed 10/31/14    Snoring     SOB (shortness of breath)     Spinal stenosis     ddd spinal stenosis    Swelling        Past Surgical History:   Procedure Laterality Date    BACK SURGERY      lower back    COLONOSCOPY  01/2016    due 2026  12/07       Social History Tobacco Use    Smoking status: Never Smoker    Smokeless tobacco: Never Used   Vaping Use    Vaping Use: Never used   Substance Use Topics    Alcohol use: No    Drug use: No       Family History   Problem Relation Age of Onset    Diabetes Father     Sickle cell anemia Family     Diabetes Sister     Cancer Brother          Above history personally reviewed  EXAM    Vitals:Blood pressure 140/72, pulse 58, temperature (!) 97 4 °F (36 3 °C), temperature source Tympanic, resp  rate 16, height 5' 6" (1 676 m), weight 88 5 kg (195 lb)  ,Body mass index is 31 47 kg/m²  Physical Exam  Constitutional:       Appearance: He is well-developed  HENT:      Head: Normocephalic and atraumatic  Eyes:      General: No scleral icterus  Conjunctiva/sclera: Conjunctivae normal       Pupils: Pupils are equal, round, and reactive to light  Neck:      Trachea: No tracheal deviation  Cardiovascular:      Rate and Rhythm: Normal rate  Pulmonary:      Effort: Pulmonary effort is normal    Abdominal:      Palpations: Abdomen is soft  Tenderness: There is no abdominal tenderness  There is no guarding  Musculoskeletal:         General: Tenderness present  Cervical back: Neck supple  Comments: TTP of the lumbar spine midline and bilateral paraspinal     Skin:     General: Skin is warm and dry  Coloration: Skin is not pale  Findings: No rash  Neurological:      Mental Status: He is alert and oriented to person, place, and time  Comments: GCS 15, Awake, Alert, Oriented x 3    Motor: VENCES, strength 5/5 throughout except left KF 4/5  Sensation:  Decreased to pinprick in the LLE: anterior/lateral thigh and anterior shin, otherwise intact in other extremities to PP  Reflexes: 1+ and symmetric, no klein's or clonus     Coordination: no drift bilateral upper extremities, finger to nose normal bilaterally          Psychiatric:         Behavior: Behavior normal          Neurologic Exam     Mental Status   Oriented to person, place, and time  Cranial Nerves     CN III, IV, VI   Pupils are equal, round, and reactive to light  MEDICAL DECISION MAKING    Imaging Studies:   I have personally reviewed pertinent reports     and I have personally reviewed pertinent films in PACS

## 2022-09-23 ENCOUNTER — CONSULT (OUTPATIENT)
Dept: ENDOCRINOLOGY | Facility: CLINIC | Age: 72
End: 2022-09-23
Payer: COMMERCIAL

## 2022-09-23 ENCOUNTER — TELEPHONE (OUTPATIENT)
Dept: ADMINISTRATIVE | Facility: OTHER | Age: 72
End: 2022-09-23

## 2022-09-23 VITALS
DIASTOLIC BLOOD PRESSURE: 70 MMHG | WEIGHT: 194 LBS | HEART RATE: 56 BPM | SYSTOLIC BLOOD PRESSURE: 122 MMHG | BODY MASS INDEX: 31.18 KG/M2 | HEIGHT: 66 IN

## 2022-09-23 DIAGNOSIS — E66.9 DIABETES MELLITUS TYPE 2 IN OBESE (HCC): Primary | Chronic | ICD-10-CM

## 2022-09-23 DIAGNOSIS — E11.69 DIABETES MELLITUS TYPE 2 IN OBESE (HCC): Primary | Chronic | ICD-10-CM

## 2022-09-23 DIAGNOSIS — I10 BENIGN ESSENTIAL HYPERTENSION: ICD-10-CM

## 2022-09-23 DIAGNOSIS — E78.5 HYPERLIPIDEMIA, UNSPECIFIED HYPERLIPIDEMIA TYPE: ICD-10-CM

## 2022-09-23 PROCEDURE — 1160F RVW MEDS BY RX/DR IN RCRD: CPT | Performed by: INTERNAL MEDICINE

## 2022-09-23 PROCEDURE — 99204 OFFICE O/P NEW MOD 45 MIN: CPT | Performed by: INTERNAL MEDICINE

## 2022-09-23 PROCEDURE — 3078F DIAST BP <80 MM HG: CPT | Performed by: INTERNAL MEDICINE

## 2022-09-23 PROCEDURE — 3074F SYST BP LT 130 MM HG: CPT | Performed by: INTERNAL MEDICINE

## 2022-09-23 NOTE — PROGRESS NOTES
New Patient Endocrinology Progress Note    Referring Provider  Laurel Corrigan Md  [address] [facility] [place] [address]     CC: diabetes     History of Present Illness:   Ivy Casas is a 74 y o  male who is presenting as a new patient to endocrinology for the evaluation of type 2 DM at the request of his primary care provider  He reports he has been diagnosed with prediabetes  Per chart review, a1c is generally within prediabetes range with the exception of 9 2 in 2020 and 6 7 in   He denies complications of diabetes  He has a history of gallstone pancreatitis 3-4 years ago  He is here to seek a second opinion about his diabetes  He reports sleep study in [place] 4 years ago, per chart review he was found to have mild obstructive sleep apnea and advised to lose weight and not sleep supine  He reports he has lost weight since that sleep study was performed  Current regimen: never on any medications  Was on metformin for one dose but had abdominal pain  Home blood glucose monitorinx day at least   Before breakfast:  150-170 in the morning  Snack at lunchtimes  Before dinner: 120-117  Stopped drinking soda years ago, drinks rarely now  Has juice 1x a week  Previously had more sugary snacks like chocolate a few months ago  Has had trouble exercising given back pain but will be following with physical therapy and is interested in exercises that will not exacerbate back pain    Diabetes education: no, has not been    Healthcare maintenance:  Ophthamology: last saw 6 -8 months ago  Family history: sister has type 2 diabetes      Patient Active Problem List   Diagnosis    Anemia    Arthralgia of knee, left    Benign enlargement of prostate    Benign essential hypertension    Complete left bundle branch block (LBBB)    Sickle cell trait ([facility] [address] [facility]    Spinal stenosis    Mild obstructive sleep apnea    Hyperlipidemia    Diabetes mellitus type 2 in obese ([address] )    Medial epicondylitis of left elbow    Post traumatic adhesive capsulitis of both shoulders    Bilateral hearing loss    Internal derangement of shoulder, right    Tear of right supraspinatus tendon      Past Medical History:   Diagnosis Date    Acute biliary pancreatitis 10/18/2020    Anemia     mild anemia    Back pain     Heart burn     History of chest pain     Hypertension     Palpitations     Problems with hearing     Problems with swallowing     Sickle cell trait ([address] )     last assessed 10/31/14    Snoring     SOB (shortness of breath)     Spinal stenosis     ddd spinal stenosis    Swelling       Past Surgical History:   Procedure Laterality Date    BACK SURGERY      lower back    COLONOSCOPY  01/2016    due 2026, Mar Hernandez  12/07      Family History   Problem Relation Age of Onset    No Known Problems Mother     Diabetes Sister     Sickle cell anemia Family      Social History     Tobacco Use    Smoking status: Never Smoker    Smokeless tobacco: Never Used   Substance Use Topics    Alcohol use: No     Allergies   Allergen Reactions    Metformin     Levofloxacin Rash     Other reaction(s): Unknown Allergic Reaction         Current Outpatient Medications:     amLODIPine (NORVASC) 10 mg tablet, TAKE 1 TABLET BY MOUTH EVERY DAY, Disp: 90 tablet, Rfl: 3    aspirin 81 mg chewable tablet, Chew 1 tablet daily, Disp: , Rfl:     Contour Next Test test strip, TEST 3 TIMES DAILY, Disp: 300 strip, Rfl: 3    Cyanocobalamin 1000 MCG CAPS, Take 1,000 mcg by mouth daily, Disp: , Rfl:     losartan (COZAAR) 100 MG tablet, Take 100 mg by mouth daily , Disp: , Rfl:     Microlet Lancets MISC, TEST 3 TIMES DAILY, Disp: 300 each, Rfl: 3    rosuvastatin (CRESTOR) 10 MG tablet, Take 1 tablet (10 mg total) by mouth daily, Disp: 90 tablet, Rfl: 3    nabumetone (RELAFEN) 750 mg tablet, TAKE 1 TABLET BY MOUTH  TWICE DAILY AFTER FOOD AS  NEEDED FOR SHOULDER PAIN OR BACK PAIN (Patient not taking: No sig reported), Disp: 180 tablet, Rfl: 3     Review of Systems   Constitutional: Negative for chills, fatigue and fever  HENT: Negative for rhinorrhea and sore throat  Respiratory: Negative for choking, chest tightness, shortness of breath, wheezing and stridor  Cardiovascular: Negative for chest pain, palpitations and leg swelling  Gastrointestinal: Negative for abdominal pain, blood in stool, constipation, diarrhea, nausea and vomiting  Genitourinary: Negative for hematuria  Neurological: Negative for dizziness and light-headedness  All other systems reviewed and are negative  Physical Exam:  Body mass index is 31 31 kg/m²  /70 (BP Location: Left arm, Patient Position: Sitting, Cuff Size: Large)   Pulse 56   Ht 5' 6" (1 676 m)   Wt 88 kg (194 lb)   BMI 31 31 kg/m²    Wt Readings from Last 3 Encounters:   09/23/22 88 kg (194 lb)   09/16/22 88 5 kg (195 lb)   08/05/22 87 8 kg (193 lb 9 6 oz)       Physical Exam  Vitals reviewed  Constitutional:       Appearance: He is well-developed  HENT:      Head: Normocephalic and atraumatic  Eyes:      General:         Right eye: No discharge  Left eye: No discharge  Cardiovascular:      Rate and Rhythm: Normal rate and regular rhythm  Pulses: no weak pulses     Heart sounds: Normal heart sounds  No murmur heard  No friction rub  No gallop  Pulmonary:      Effort: Pulmonary effort is normal  No respiratory distress  Breath sounds: Normal breath sounds  No wheezing or rales  Chest:      Chest wall: No tenderness  Abdominal:      General: Bowel sounds are normal  There is no distension  Palpations: Abdomen is soft  There is no mass  Tenderness: There is no abdominal tenderness  Musculoskeletal:         General: Normal range of motion  Cervical back: Normal range of motion and neck supple  Feet:      Right foot:      Skin integrity: No ulcer, skin breakdown, erythema, warmth, callus or dry skin  Left foot:      Skin integrity: No ulcer, skin breakdown, erythema, warmth, callus or dry skin  Skin:     General: Skin is warm and dry  Neurological:      Mental Status: He is alert and oriented to person, place, and time  Psychiatric:         Behavior: Behavior normal        Patient's shoes and socks removed  Right Foot/Ankle   Right Foot Inspection  Skin Exam: skin normal and skin intact  No dry skin, no warmth, no callus, no erythema, no maceration, no abnormal color, no pre-ulcer, no ulcer and no callus  Sensory   Monofilament testing: intact    Left Foot/Ankle  Left Foot Inspection  Skin Exam: skin normal and skin intact  No dry skin, no warmth, no erythema, no maceration, normal color, no pre-ulcer, no ulcer and no callus  Sensory   Monofilament testing: intact    Assign Risk Category  No deformity present  No loss of protective sensation  No weak pulses  Risk: 0    Labs:   Lab Results   Component Value Date    HGBA1C 6 5 (H) 08/05/2022       Lab Results   Component Value Date    [id number] 2 78 10/27/2017    TSH 2 42 08/05/2022       Lab Results   Component Value Date    CREATININE 0 92 08/05/2022    CREATININE 0 74 05/09/2022    CREATININE 0 77 12/20/2021    BUN 13 08/05/2022     10/27/2017    K 4 6 08/05/2022     08/05/2022    CO2 28 08/05/2022     eGFR   Date Value Ref Range Status   08/05/2022 87 > OR = 60 mL/min/1 73m2 Final     Comment:     The eGFR is based on the CKD-EPI 2021 equation  To calculate   the new eGFR from a previous Creatinine or Cystatin C  result, go to [facility] at  [email or web address]  [email or web address]     09/11/2018 72 ml/min/1 73sq m Final     No components found for: [facility]    Lab Results   Component Value Date    CHOL 113 10/27/2017    HDL 49 08/05/2022    TRIG 100 08/05/2022       Lab Results   Component Value Date    ALT 22 05/09/2022    AST 15 05/09/2022    ALKPHOS 86 05/09/2022    BILITOT 0 8 10/27/2017       Not on file Impression:  1  Diabetes mellitus type 2 in obese ([address] )    2  Benign essential hypertension    3  Hyperlipidemia, unspecified hyperlipidemia type           Plan:  Diagnoses and all orders for this visit:    Diabetes mellitus type 2 in obese [facility])  -     Ambulatory referral to Endocrinology  -     Ambulatory Referral to Diabetic Education; Future  Referral to diabetes education for classes and medical nutrition therapy  Referral placed for medical fitness, this was discussed with patient  We discussed weight loss and improving exercise habits can improve a1c  Pt is open to trying extended release metformin in the future if a1c is uncontrolled and requiring medications  Most recent a1c 6 5, goal a1c <7  We discussed that he can continue management with primary care provider unless a1c worsens and he is agreeable  We discussed the diagnosis of diabetes with most recent a1c 6 5 and prior a1cs 6 7 and 9 2 in the past     Benign essential hypertension  On losartan 100mg and amlopidine 10mg, BP well controlled today,     Hyperlipidemia, unspecified hyperlipidemia type  On rosuvastatin 10mg, continue management with primary care provider      Discussed with the patient and all questioned fully answered  He will call me if any problems arise

## 2022-09-23 NOTE — LETTER
2022     Geovanni Coronado MD  [address] [facility] [address]    Patient: Javan Jean   YOB: 1950   Date of Visit: 2022       Dear Dr Shani Mills: Thank you for referring Javan Jean to me for evaluation  Below are my notes for this consultation  If you have questions, please do not hesitate to call me  I look forward to following your patient along with you  Sincerely,        Cheli Warren MD        CC: Anushka James DO  2022 12:22 PM  Sign when Signing Visit  New Patient Endocrinology Progress Note    Referring Provider  Geovanni Coronado Md  [address] [place], [place] [address]     CC: diabetes     History of Present Illness:   Javan Jean is a 74 y o  male who is presenting as a new patient to endocrinology for the evaluation of type 2 DM at the request of his primary care provider  He reports he has been diagnosed with prediabetes  Per chart review, a1c is generally within prediabetes range with the exception of 9 2 in 2020 and 6 7 in   He denies complications of diabetes  He has a history of gallstone pancreatitis 3-4 years ago  He is here to seek a second opinion about his diabetes  He reports sleep study in [place] 4 years ago, per chart review he was found to have mild obstructive sleep apnea and advised to lose weight and not sleep supine  He reports he has lost weight since that sleep study was performed  Current regimen: never on any medications  Was on metformin for one dose but had abdominal pain  Home blood glucose monitorinx day at least   Before breakfast:  150-170 in the morning  Snack at lunchtimes  Before dinner: 120-117  Stopped drinking soda years ago, drinks rarely now  Has juice 1x a week  Previously had more sugary snacks like chocolate a few months ago      Has had trouble exercising given back pain but will be following with physical therapy and is interested in exercises that will not exacerbate back pain    Diabetes education: no, has not been    Healthcare maintenance:  Ophthamology: last saw 6 -8 months ago  Family history: sister has type 2 diabetes      Patient Active Problem List   Diagnosis    Anemia    Arthralgia of knee, left    Benign enlargement of prostate    Benign essential hypertension    Complete left bundle branch block (LBBB)    Sickle cell trait ([facility])    Spinal stenosis    Mild obstructive sleep apnea    Hyperlipidemia    Diabetes mellitus type 2 in obese ([facility])    Medial epicondylitis of left elbow    Post traumatic adhesive capsulitis of both shoulders    Bilateral hearing loss    Internal derangement of shoulder, right    Tear of right supraspinatus tendon      Past Medical History:   Diagnosis Date    Acute biliary pancreatitis 10/18/2020    Anemia     mild anemia    Back pain     Heart burn     History of chest pain     Hypertension     Palpitations     Problems with hearing     Problems with swallowing     Sickle cell trait ([address] )     last assessed 10/31/14    Snoring     SOB (shortness of breath)     Spinal stenosis     ddd spinal stenosis    Swelling       Past Surgical History:   Procedure Laterality Date    BACK SURGERY      lower back    COLONOSCOPY  01/2016    due 2026, Ramona Hernandez  12/07      Family History   Problem Relation Age of Onset    No Known Problems Mother     Diabetes Sister     Sickle cell anemia Family      Social History     Tobacco Use    Smoking status: Never Smoker    Smokeless tobacco: Never Used   Substance Use Topics    Alcohol use: No     Allergies   Allergen Reactions    Metformin     Levofloxacin Rash     Other reaction(s): Unknown Allergic Reaction         Current Outpatient Medications:     amLODIPine (NORVASC) 10 mg tablet, TAKE 1 TABLET BY MOUTH EVERY DAY, Disp: 90 tablet, Rfl: 3    aspirin 81 mg chewable tablet, Chew 1 tablet daily, Disp: , Rfl:     Contour Next Test test strip, TEST 3 TIMES DAILY, Disp: 300 strip, Rfl: 3    Cyanocobalamin 1000 MCG CAPS, Take 1,000 mcg by mouth daily, Disp: , Rfl:     losartan (COZAAR) 100 MG tablet, Take 100 mg by mouth daily , Disp: , Rfl:     Microlet Lancets MISC, TEST 3 TIMES DAILY, Disp: 300 each, Rfl: 3    rosuvastatin (CRESTOR) 10 MG tablet, Take 1 tablet (10 mg total) by mouth daily, Disp: 90 tablet, Rfl: 3    nabumetone (RELAFEN) 750 mg tablet, TAKE 1 TABLET BY MOUTH  TWICE DAILY AFTER FOOD AS  NEEDED FOR SHOULDER PAIN OR BACK PAIN (Patient not taking: No sig reported), Disp: 180 tablet, Rfl: 3     Review of Systems   Constitutional: Negative for chills, fatigue and fever  HENT: Negative for rhinorrhea and sore throat  Respiratory: Negative for choking, chest tightness, shortness of breath, wheezing and stridor  Cardiovascular: Negative for chest pain, palpitations and leg swelling  Gastrointestinal: Negative for abdominal pain, blood in stool, constipation, diarrhea, nausea and vomiting  Genitourinary: Negative for hematuria  Neurological: Negative for dizziness and light-headedness  All other systems reviewed and are negative  Physical Exam:  Body mass index is 31 31 kg/m²  /70 (BP Location: Left arm, Patient Position: Sitting, Cuff Size: Large)   Pulse 56   Ht 5' 6" (1 676 m)   Wt 88 kg (194 lb)   BMI 31 31 kg/m²    Wt Readings from Last 3 Encounters:   09/23/22 88 kg (194 lb)   09/16/22 88 5 kg (195 lb)   08/05/22 87 8 kg (193 lb 9 6 oz)       Physical Exam  Vitals reviewed  Constitutional:       Appearance: He is well-developed  HENT:      Head: Normocephalic and atraumatic  Eyes:      General:         Right eye: No discharge  Left eye: No discharge  Cardiovascular:      Rate and Rhythm: Normal rate and regular rhythm  Pulses: no weak pulses     Heart sounds: Normal heart sounds  No murmur heard  No friction rub  No gallop     Pulmonary:      Effort: Pulmonary effort is normal  No respiratory distress  Breath sounds: Normal breath sounds  No wheezing or rales  Chest:      Chest wall: No tenderness  Abdominal:      General: Bowel sounds are normal  There is no distension  Palpations: Abdomen is soft  There is no mass  Tenderness: There is no abdominal tenderness  Musculoskeletal:         General: Normal range of motion  Cervical back: Normal range of motion and neck supple  Feet:      Right foot:      Skin integrity: No ulcer, skin breakdown, erythema, warmth, callus or dry skin  Left foot:      Skin integrity: No ulcer, skin breakdown, erythema, warmth, callus or dry skin  Skin:     General: Skin is warm and dry  Neurological:      Mental Status: He is alert and oriented to person, place, and time  Psychiatric:         Behavior: Behavior normal        Patient's shoes and socks removed  Right Foot/Ankle   Right Foot Inspection  Skin Exam: skin normal and skin intact  No dry skin, no warmth, no callus, no erythema, no maceration, no abnormal color, no pre-ulcer, no ulcer and no callus  Sensory   Monofilament testing: intact    Left Foot/Ankle  Left Foot Inspection  Skin Exam: skin normal and skin intact  No dry skin, no warmth, no erythema, no maceration, normal color, no pre-ulcer, no ulcer and no callus       Sensory   Monofilament testing: intact    Assign Risk Category  No deformity present  No loss of protective sensation  No weak pulses  Risk: 0    Labs:   Lab Results   Component Value Date    HGBA1C 6 5 (H) 08/05/2022       Lab Results   Component Value Date    [id number] 2 78 10/27/2017    TSH 2 42 08/05/2022       Lab Results   Component Value Date    CREATININE 0 92 08/05/2022    CREATININE 0 74 05/09/2022    CREATININE 0 77 12/20/2021    BUN 13 08/05/2022     10/27/2017    K 4 6 08/05/2022     08/05/2022    CO2 28 08/05/2022     eGFR   Date Value Ref Range Status   08/05/2022 87 > OR = 60 mL/min/1 73m2 Final     Comment:     The eGFR is based on the CKD-EPI 2021 equation  To calculate   the new eGFR from a previous Creatinine or Cystatin C  result, go to [facility] at  [email or web address]  [email or web address]     09/11/2018 72 ml/min/1 73sq m Final     No components found for: [facility]    Lab Results   Component Value Date    CHOL 113 10/27/2017    HDL 49 08/05/2022    TRIG 100 08/05/2022       Lab Results   Component Value Date    ALT 22 05/09/2022    AST 15 05/09/2022    ALKPHOS 86 05/09/2022    BILITOT 0 8 10/27/2017       Not on file     Impression:  1  Diabetes mellitus type 2 in obese ([address] )    2  Benign essential hypertension    3  Hyperlipidemia, unspecified hyperlipidemia type           Plan:  Diagnoses and all orders for this visit:    Diabetes mellitus type 2 in obese [facility])  -     Ambulatory referral to Endocrinology  -     Ambulatory Referral to Diabetic Education; Future  Referral to diabetes education for classes and medical nutrition therapy  Referral placed for medical fitness, this was discussed with patient  We discussed weight loss and improving exercise habits can improve a1c  Pt is open to trying extended release metformin in the future if a1c is uncontrolled and requiring medications  Most recent a1c 6 5, goal a1c <7  We discussed that he can continue management with primary care provider unless a1c worsens and he is agreeable  We discussed the diagnosis of diabetes with most recent a1c 6 5 and prior a1cs 6 7 and 9 2 in the past     Benign essential hypertension  On losartan 100mg and amlopidine 10mg, BP well controlled today,     Hyperlipidemia, unspecified hyperlipidemia type  On rosuvastatin 10mg, continue management with primary care provider      Discussed with the patient and all questioned fully answered  He will call me if any problems arise

## 2022-09-23 NOTE — TELEPHONE ENCOUNTER
----- Message from Maryana Herrera sent at 9/23/2022 10:36 AM EDT -----  Regarding: HM DM EYE EXAM  09/23/22 10:36 AM    Hello, our patient Sukhi Garcia has had Diabetic Eye Exam completed/performed  Please assist in updating the patient chart by calling the office Dr Jef Benz       Thank you,  Mami Medina  [facility]

## 2022-09-26 ENCOUNTER — EVALUATION (OUTPATIENT)
Dept: PHYSICAL THERAPY | Facility: REHABILITATION | Age: 72
End: 2022-09-26
Payer: COMMERCIAL

## 2022-09-26 DIAGNOSIS — M48.062 SPINAL STENOSIS OF LUMBAR REGION WITH NEUROGENIC CLAUDICATION: Primary | ICD-10-CM

## 2022-09-26 PROCEDURE — 97161 PT EVAL LOW COMPLEX 20 MIN: CPT

## 2022-09-26 NOTE — PROGRESS NOTES
PT Evaluation     Today's date: 2022  Patient name: Lynne Mejía  : 1950  MRN: [id number]  Referring provider: Kory Huffman PA-C  Dx:   Encounter Diagnosis     ICD-10-CM    1  Spinal stenosis of lumbar region with neurogenic claudication  [id number]        Start Time: 0700  Stop Time: 07  Total time in clinic (min): 20 minutes    Assessment  Assessment details: Lynne Mejía is a pleasant 74 y o  male who presents with low back and groin pain and mild radiating symptoms into his legs  He has decreased lumbar mobility, decreased core strength and stability, and weakness of his glutes resulting in the pain he is experiencing, worry over not knowing what's wrong, concern at no signs of improvement and fear of not being able to keep active  No further referral appears necessary at this time based upon examination results  I expect he will improve in 8 weeks  Positive prognostic indicators include positive attitude toward recovery and good understanding of diagnosis and treatment plan options  Negative prognostic indicators include chronicity of symptoms, high symptom irritability and obesity  Problem List:  1) lumbar hypomobility  2) decreased core stability/weakness  Impairments: abnormal or restricted ROM, activity intolerance, impaired physical strength, lacks appropriate home exercise program, pain with function, weight-bearing intolerance and poor posture   Understanding of Dx/Px/POC: good   Prognosis: fair    Goals  ST-4 weeks  Patient will be independent with home exercise program    Patient will be able to manage symptoms independently    Patient will decrease pain by 25-50%    LTG: by discharge  Patient will improve FOTO to goal  Patient will report minimal (1-2/10) pain with aggravating activities to display improvements in overall functional status      Plan  Patient would benefit from: skilled physical therapy  Planned modality interventions: cryotherapy, thermotherapy: hydrocollator packs and unattended electrical stimulation  Planned therapy interventions: IADL retraining, joint mobilization, manual therapy, massage, ADL training, activity modification, abdominal trunk stabilization, ADL retraining, balance, balance/weight bearing training, neuromuscular re-education, body mechanics training, behavior modification, strengthening, stretching, therapeutic activities, therapeutic exercise, therapeutic training, transfer training, graded exercise, graded motor, home exercise program, graded activity, gait training, functional ROM exercises, patient education, postural training and flexibility  Frequency: 2x week  Duration in visits: 16  Duration in weeks: 8  Treatment plan discussed with: patient        Subjective Evaluation    History of Present Illness  Mechanism of injury: Anurag Salas is a 74 y o  male presenting to physical therapy on 22 with referral from MD for low back pain with pain into the groin  Also reports some radiation like symptoms into both legs  Reports some sleep disturbances  Has some pain with coughing and sneezing  Reports some pain when he bears down when using the rest room  Quality of life: good    Pain  Current pain ratin  At best pain ratin  At worst pain ratin  Location: low back, groin,   Quality: sharp and dull ache  Relieving factors: relaxation, rest and change in position  Aggravating factors: walking and standing    Treatments  Previous treatment: physical therapy  Patient Goals  Patient goals for therapy: return to sport/leisure activities, independence with ADLs/IADLs, increased strength, decreased pain and increased motion          Objective    Palpation: TTP over L3-5 SPs and TPs  Myotomes: All intact b/l  Dermatome: all intact b/l     Reflexes:  L4: absent       S1: 2+ b/l    Lumbar Spine Protective Mechanism: (+) in all planes for weakness, delayed firing    Lumbar  % of normal   Flex  75% P   Extn   50% P   SB Left 100% P SB Right 100% P   ROT Left 100% P   ROT Right 100% P         MMT         AROM          PROM    Hip       L       R        L           R      L     R   Flex  4 4   WFL WFL   Extn  Abd  [facility] WFL   Add  IR      [facility]   ER  WFL WFL            G  Max 3+ 3+       G  Med  3 3       Iliop               Neuro Dynamic Testing:  Slump test: L= (-)   R= (-)       Segmental mobility:   LS= hyopmobile    Psoas restrictions noted bilaterally  Poor lumbar and hip dissociation          Precautions: DMII, anemia    Manuals 9/26            Lumbar dissociation mob                                                    Neuro Re-Ed 9/26            TrA             pallof             Curl up             Suitcase carry             bridges             clamshells                          Ther Ex 9/26            VG             Squats with TB             LTR HEP            Standing lumbar ext HEP                                                                Ther Activity                                       Gait Training                                       Modalities

## 2022-09-27 NOTE — TELEPHONE ENCOUNTER
Upon review of the In Basket request we 2021 report in    Called practice that is most current  Any additional questions or concerns should be emailed to the Practice Liaisons via [email or web address] email, please do not reply via In Basket      Thank you  Mandeep To MA

## 2022-10-04 ENCOUNTER — OFFICE VISIT (OUTPATIENT)
Dept: PHYSICAL THERAPY | Facility: REHABILITATION | Age: 72
End: 2022-10-04
Payer: COMMERCIAL

## 2022-10-04 DIAGNOSIS — M48.062 SPINAL STENOSIS OF LUMBAR REGION WITH NEUROGENIC CLAUDICATION: Primary | ICD-10-CM

## 2022-10-04 PROCEDURE — 97112 NEUROMUSCULAR REEDUCATION: CPT

## 2022-10-04 PROCEDURE — 97140 MANUAL THERAPY 1/> REGIONS: CPT

## 2022-10-04 PROCEDURE — 97110 THERAPEUTIC EXERCISES: CPT

## 2022-10-04 NOTE — PROGRESS NOTES
Daily Note     Today's date: 10/4/2022  Patient name: Robert Hernandez  : 1950  MRN: [id number]  Referring provider: Wen Edmond PA-C  Dx:   Encounter Diagnosis     ICD-10-CM    1  Spinal stenosis of lumbar region with neurogenic claudication  M48 062        Start Time: 0800  Stop Time: 0844  Total time in clinic (min): 44 minutes    Subjective: Reports his back is about the same  States the exercises for home do make his back feel better  Objective: See treatment diary below    Assessment: Tolerated treatment well  Mild increase in discomfort with bridges  Treadmill performed at end of treatment per pt request  Patient would benefit from continued PT    Plan: Continue per plan of care  Treadmill pre-treat        Precautions: DMII, anemia    Manuals 9/26 10/4           Lumbar dissociation mob  QD 3x10 ea           Lumbar mobs  PA 3x30 GrIV           Lumbar gapping             Assessment  QD           Neuro Re-Ed 9/26 10/4           TrA             pallof  20x ea OJB           Curl up  15x5"           Suitcase carry  3 laps 10lb ea           bridges  3x10 YHB           clamshells  15x ea YHB                        Ther Ex 9/26 10/4           VG             Squats with TB             LTR HEP 20x ea           Standing lumbar ext HEP 3x10           treadmill  8'                                                  Ther Activity                                       Gait Training                                       Modalities

## 2022-10-05 DIAGNOSIS — M48.062 SPINAL STENOSIS OF LUMBAR REGION WITH NEUROGENIC CLAUDICATION: Primary | ICD-10-CM

## 2022-10-05 RX ORDER — GABAPENTIN 100 MG/1
CAPSULE ORAL
Qty: 90 CAPSULE | Refills: 1 | Status: SHIPPED | OUTPATIENT
Start: 2022-10-05

## 2022-10-06 ENCOUNTER — OFFICE VISIT (OUTPATIENT)
Dept: PHYSICAL THERAPY | Facility: REHABILITATION | Age: 72
End: 2022-10-06
Payer: COMMERCIAL

## 2022-10-06 DIAGNOSIS — M48.062 SPINAL STENOSIS OF LUMBAR REGION WITH NEUROGENIC CLAUDICATION: Primary | ICD-10-CM

## 2022-10-06 PROCEDURE — 97140 MANUAL THERAPY 1/> REGIONS: CPT

## 2022-10-06 PROCEDURE — 97112 NEUROMUSCULAR REEDUCATION: CPT

## 2022-10-06 PROCEDURE — 97110 THERAPEUTIC EXERCISES: CPT

## 2022-10-06 NOTE — PROGRESS NOTES
Daily Note     Today's date: 10/6/2022  Patient name: Maxi Desai  : 1950  MRN: [id number]  Referring provider: Bhargavi Pace PA-C  Dx:   Encounter Diagnosis     ICD-10-CM    1  Spinal stenosis of lumbar region with neurogenic claudication  M48 062        Start Time: 0815  Stop Time: 0857  Total time in clinic (min): 42 minutes    Subjective: Reports his back is doing a little better  Objective: See treatment diary below    Assessment: Tolerated treatment well  Mild discomfort reported during exercises, but did not worsen and decreased with cessation of exercises  Patient would benefit from continued PT    Plan: Continue per plan of care        Precautions: DMII, anemia    Manuals 9/26 10/4 10/6          Lumbar dissociation mob  QD 3x10 ea QD 2x10 ea          Lumbar mobs  PA 3x30 GrIV           Lumbar gapping             Assessment  QD QD 3'          Neuro Re-Ed 9/26 10/4 10/6          TrA             pallof  20x ea OJB 20x ea OJB          Curl up  15x5" 15x5"          Suitcase carry  3 laps 10lb ea 4 laps 10lb KB YJB susp          bridges  3x10 YHB 2x10 YHB          clamshells  15x ea YHB 15x ea YHB                       Ther Ex 9/26 10/4 10/6          VG             Squats with TB             LTR HEP 20x ea 20x ea          Standing lumbar ext HEP 3x10           treadmill  8' 8'                                                 Ther Activity                                       Gait Training                                       Modalities

## 2022-10-11 ENCOUNTER — OFFICE VISIT (OUTPATIENT)
Dept: PHYSICAL THERAPY | Facility: REHABILITATION | Age: 72
End: 2022-10-11
Payer: COMMERCIAL

## 2022-10-11 DIAGNOSIS — M48.062 SPINAL STENOSIS OF LUMBAR REGION WITH NEUROGENIC CLAUDICATION: Primary | ICD-10-CM

## 2022-10-11 PROCEDURE — 97112 NEUROMUSCULAR REEDUCATION: CPT

## 2022-10-11 PROCEDURE — 97140 MANUAL THERAPY 1/> REGIONS: CPT

## 2022-10-11 PROCEDURE — 97110 THERAPEUTIC EXERCISES: CPT

## 2022-10-11 NOTE — PROGRESS NOTES
Daily Note     Today's date: 10/11/2022  Patient name: Ceferino Apple  : 1950  MRN: [id number]  Referring provider: Belen Sims PA-C  Dx:   Encounter Diagnosis     ICD-10-CM    1  Spinal stenosis of lumbar region with neurogenic claudication  M48 062        Start Time: 08  Stop Time: 0847  Total time in clinic (min): 44 minutes    Subjective: Reports his back is doing better  Objective: See treatment diary below    Assessment: Tolerated treatment well  More restrictions noted on L side during mobility assessment  No pain during exercises  Patient would benefit from continued PT    Plan: Continue per plan of care  Progress as able       Precautions: DMII, anemia    Manuals 9/26 10/4 10/6 10/11         Lumbar dissociation mob  QD 3x10 ea QD 2x10 ea          Lumbar mobs  PA 3x30 GrIV  PA 3x30 GrIV         Lumbar gapping    QD 2x30 ea         Assessment  QD QD 3' QD 5'         Neuro Re-Ed 9/26 10/4 10/6 10/11         TrA             pallof  20x ea OJB 20x ea OJB 20x ea OJB         Curl up  15x5" 15x5"          Suitcase carry  3 laps 10lb ea 4 laps 10lb KB YJB susp 4 laps 10lb KB UJB suspend         bridges  3x10 YHB 2x10 YHB 25x YHB         clamshells  15x ea YHB 15x ea YHB 20x ea YHB                      Ther Ex 9/26 10/4 10/6 10/11         VG             Squats with TB             LTR HEP 20x ea 20x ea 2x10 ea         Standing lumbar ext HEP 3x10  3x10         treadmill  8' 8' 10'                                                Ther Activity                                       Gait Training                                       Modalities

## 2022-10-13 ENCOUNTER — OFFICE VISIT (OUTPATIENT)
Dept: PHYSICAL THERAPY | Facility: REHABILITATION | Age: 72
End: 2022-10-13
Payer: COMMERCIAL

## 2022-10-13 DIAGNOSIS — M48.062 SPINAL STENOSIS OF LUMBAR REGION WITH NEUROGENIC CLAUDICATION: Primary | ICD-10-CM

## 2022-10-13 PROCEDURE — 97112 NEUROMUSCULAR REEDUCATION: CPT

## 2022-10-13 PROCEDURE — 97110 THERAPEUTIC EXERCISES: CPT

## 2022-10-13 NOTE — PROGRESS NOTES
Daily Note     Today's date: 10/13/2022  Patient name: Ceferino Apple  : 1950  MRN: [id number]  Referring provider: Belen Sims PA-C  Dx:   Encounter Diagnosis     ICD-10-CM    1  Spinal stenosis of lumbar region with neurogenic claudication  M48 062        Start Time: 0815  Stop Time: 0858  Total time in clinic (min): 43 minutes    Subjective: Reports his back is doing better  Objective: See treatment diary below    Assessment: Tolerated treatment well  No discomfort reported during exercise protocol  Patient would benefit from continued PT    Plan: Continue per plan of care        Precautions: DMII, anemia    Manuals 9/26 10/4 10/6 10/11 10/13        Lumbar dissociation mob  QD 3x10 ea QD 2x10 ea          Lumbar mobs  PA 3x30 GrIV  PA 3x30 GrIV         Lumbar gapping    QD 2x30 ea         Assessment  QD QD 3' QD 5'         Neuro Re-Ed 9/26 10/4 10/6 10/11 10/13        TrA             pallof  20x ea OJB 20x ea OJB 20x ea OJB 20x ea OJB press        Curl up  15x5" 15x5"          Suitcase carry  3 laps 10lb ea 4 laps 10lb KB YJB susp 4 laps 10lb KB UJB suspend 6 laps 10lb YJB suspend        bridges  3x10 YHB 2x10 YHB 25x YHB 3x10 YHB        clamshells  15x ea YHB 15x ea YHB 20x ea YHB 2x10 YHB ea        Record multfidi     15x ea 3" hold        Ther Ex 9/26 10/4 10/6 10/11 10/13        VG             Squats with TB             LTR HEP 20x ea 20x ea 2x10 ea         Standing lumbar ext HEP 3x10  3x10         treadmill  8' 8' 10' 15'                                               Ther Activity                                       Gait Training                                       Modalities

## 2022-10-14 ENCOUNTER — CONSULT (OUTPATIENT)
Dept: PAIN MEDICINE | Facility: CLINIC | Age: 72
End: 2022-10-14
Payer: COMMERCIAL

## 2022-10-14 VITALS
SYSTOLIC BLOOD PRESSURE: 146 MMHG | HEIGHT: 66 IN | BODY MASS INDEX: 31.34 KG/M2 | WEIGHT: 195 LBS | HEART RATE: 56 BPM | DIASTOLIC BLOOD PRESSURE: 75 MMHG

## 2022-10-14 DIAGNOSIS — M54.16 LUMBAR RADICULOPATHY: ICD-10-CM

## 2022-10-14 DIAGNOSIS — M48.062 SPINAL STENOSIS OF LUMBAR REGION WITH NEUROGENIC CLAUDICATION: ICD-10-CM

## 2022-10-14 DIAGNOSIS — M47.816 LUMBAR SPONDYLOSIS: Primary | ICD-10-CM

## 2022-10-14 PROCEDURE — 99204 OFFICE O/P NEW MOD 45 MIN: CPT | Performed by: ANESTHESIOLOGY

## 2022-10-14 NOTE — PROGRESS NOTES
Assessment  1  Lumbar spondylosis    2  Spinal stenosis of lumbar region with neurogenic claudication    3  Lumbar radiculopathy        Plan  49-year-old male with a history of hypertension, diabetes, left L3-4 diskectomy in 2012, referred by Neurosurgery, presenting for initial consultation regarding a long-standing history of lumbosacral back pain which is now radiating into bilateral groin and into the anterior aspect of the left thigh to the knee with associated numbness  Pain is brought on with any significant distance of ambulation and standing  He denies any recent trauma or inciting event  The patient did have a few years of relief of the radicular pain in the left lower extremity after his surgery in 2012 until symptoms in the groin and leg began again  MRI of the lumbar spine shows multilevel degenerative disc disease and spondylosis  Severe central and recess stenosis at L2-3  Severe left and mild right foraminal stenosis  Moderate to severe central stenosis at L3-4  Severe left foraminal stenosis  Moderate to severe bilateral recess stenosis  Mild central and right foraminal stenosis at L4-5 with moderate right recess stenosis  The patient has done numerous rounds of physical therapy with only transient relief  He was prescribed gabapentin, however has not yet started this  Tylenol and NSAIDs provide minimal relief  He has had epidural steroid injections in the past with variable relief by an outside pain specialist     Patient's symptoms are consistent with lumbar stenosis with neurogenic claudication and lumbar radiculopathy  Low back pain also likely has some facet mediated component as well  No evidence of sacroiliitis  1  I did offer the patient bilateral L2 TFESI, however the patient would like to hold off on interventional therapy at this time   2  Encouraged the patient to try gabapentin as prescribed   3  Patient will continue with his home exercise program  4   I will follow up the patient in 6 weeks or sooner if needed      My impressions and treatment recommendations were discussed in detail with the patient who verbalized understanding and had no further questions  Discharge instructions were provided  I personally saw and examined the patient and I agree with the above discussed plan of care  No orders of the defined types were placed in this encounter  No orders of the defined types were placed in this encounter  History of Present Illness    Johnny Joseph is a 74 y o  male with a history of hypertension, diabetes, left L3-4 diskectomy in 2012, referred by Neurosurgery, presenting for initial consultation regarding a long-standing history of lumbosacral back pain which is now radiating into bilateral groin and into the anterior aspect of the left thigh to the knee with associated numbness  Pain is brought on with any significant distance of ambulation and standing  He denies any recent trauma or inciting event  The patient did have a few years of relief of the radicular pain in the left lower extremity after his surgery in 2012 until symptoms in the groin and leg began again  He denies any bladder or bowel incontinence or saddle anesthesia  MRI of the lumbar spine shows multilevel degenerative disc disease and spondylosis  Severe central and recess stenosis at L2-3  Severe left and mild right foraminal stenosis  Moderate to severe central stenosis at L3-4  Severe left foraminal stenosis  Moderate to severe bilateral recess stenosis  Mild central and right foraminal stenosis at L4-5 with moderate right recess stenosis  The patient has done numerous rounds of physical therapy with only transient relief  He was prescribed gabapentin, however has not yet started this  Tylenol and NSAIDs provide minimal relief    He has had epidural steroid injections in the past with variable relief by an outside pain specialist     The patient rates her pain an 8/10 on the pain is nearly constant  The pain is worse in the afternoon and evening  The pain is described as shooting, numbness, sharp, and pins and needles  The pain is increased with lying down, standing, walking, coughing, sneezing, and bowel movements  The pain is alleviated with bending forward and exercise  Other than as stated above, the patient denies any interval changes in medications, medical condition, mental condition, symptoms, or allergies since the last office visit  I have personally reviewed and/or updated the patient's past medical history, past surgical history, family history, social history, current medications, allergies, and vital signs today       Review of Systems    Patient Active Problem List   Diagnosis   • Anemia   • Arthralgia of knee, left   • Benign enlargement of prostate   • Benign essential hypertension   • Complete left bundle branch block (LBBB)   • Sickle cell trait ([facility] [address] )   • Spinal stenosis   • Mild obstructive sleep apnea   • Hyperlipidemia   • Diabetes mellitus type 2 in [facility])   • Medial epicondylitis of left elbow   • Post traumatic adhesive capsulitis of both shoulders   • Bilateral hearing loss   • Internal derangement of shoulder, right   • Tear of right supraspinatus tendon       Past Medical History:   Diagnosis Date   • Acute biliary pancreatitis 10/18/2020   • Anemia     mild anemia   • Back pain    • Heart burn    • History of chest pain    • Hypertension    • Palpitations    • Problems with hearing    • Problems with swallowing    • Sickle cell trait ([address] )     last assessed 10/31/14   • Snoring    • SOB (shortness of breath)    • Spinal stenosis     ddd spinal stenosis   • Swelling        Past Surgical History:   Procedure Laterality Date   • BACK SURGERY      lower back   • COLONOSCOPY  01/2016    due 2026, Olimpia Hernandez  12/07       Family History   Problem Relation Age of Onset   • No Known Problems Mother    • Diabetes Sister    • Sickle cell anemia Family        Social History     Occupational History   • Not on file   Tobacco Use   • Smoking status: Never Smoker   • Smokeless tobacco: Never Used   Vaping Use   • Vaping Use: Never used   Substance and Sexual Activity   • Alcohol use: No   • Drug use: No   • Sexual activity: Not on file       Current Outpatient Medications on File Prior to Visit   Medication Sig   • amLODIPine (NORVASC) 10 mg tablet TAKE 1 TABLET BY MOUTH EVERY DAY   • aspirin 81 mg chewable tablet Chew 1 tablet daily   • Contour Next Test test strip TEST 3 TIMES DAILY   • Cyanocobalamin 1000 MCG CAPS Take 1,000 mcg by mouth daily   • gabapentin (NEURONTIN) 100 mg capsule Take 1 capsule at bedtime for 5 days then increase dose to 2 capsules at bedtime for 5 days, then increase dose up to 3 capsules at bedtime if needed   • losartan (COZAAR) 100 MG tablet Take 100 mg by mouth daily    • Microlet Lancets MISC TEST 3 TIMES DAILY   • nabumetone (RELAFEN) 750 mg tablet TAKE 1 TABLET BY MOUTH  TWICE DAILY AFTER FOOD AS  NEEDED FOR SHOULDER PAIN OR BACK PAIN (Patient not taking: No sig reported)   • rosuvastatin (CRESTOR) 10 MG tablet Take 1 tablet (10 mg total) by mouth daily     No current facility-administered medications on file prior to visit  Allergies   Allergen Reactions   • Metformin    • Levofloxacin Rash     Other reaction(s): Unknown Allergic Reaction       Physical Exam    /75   Pulse 56   Ht 5' 6" (1 676 m)   Wt 88 5 kg (195 lb)   BMI 31 47 kg/m²     Constitutional: normal, well developed, well nourished, alert, in no distress and non-toxic and no overt pain behavior  Eyes: anicteric  HEENT: grossly intact  Neck: supple, symmetric, trachea midline and no masses   Pulmonary:even and unlabored  Cardiovascular:No edema or pitting edema present  Skin:Normal without rashes or lesions and well hydrated  Psychiatric:Mood and affect appropriate  Neurologic:Cranial Nerves II-XII grossly intact  Musculoskeletal:normal gait  Bilateral lumbar paraspinals nontender to palpation  Bilateral SI joints nontender to palpation  Bilateral trochanteric flares nontender to palpation  Left patellar reflex 1/4  Right patellar reflex 2/4  Bilateral Achilles reflexes 1/4  No clonus was noted bilaterally  No clonus was noted bilaterally  Bilateral lower extremity strength was 5/5 in all muscle groups  Sensation intact to light touch in L3 thru S1 dermatomes bilaterally  Positive straight leg raise on the left  Negative Marcos's and Gaenslen's test bilaterally  Imaging      Study Result    Narrative & Impression   MRI LUMBAR SPINE WITHOUT CONTRAST     INDICATION: M48 07: Spinal stenosis, lumbosacral region  Low back pain      COMPARISON:  None      TECHNIQUE:  Sagittal T1, sagittal T2, sagittal inversion recovery, axial T1 and axial T2, coronal T2     IMAGE QUALITY:  Diagnostic     FINDINGS:     VERTEBRAL BODIES:  There are 5 lumbar type vertebral bodies  Slight anterolisthesis L3-4 and L4-5  Superior endplate Schmorl's node L3     SACRUM:  Normal signal within the sacrum   No evidence of insufficiency or stress fracture      DISTAL CORD AND CONUS:  Normal size and signal within the distal cord and conus      PARASPINAL SOFT TISSUES:  Paraspinal soft tissues are unremarkable      LOWER THORACIC DISC SPACES:  Normal disc height and signal   No disc herniation, canal stenosis or foraminal narrowing      LUMBAR DISC SPACES:     L1-L2:  Moderate facet hypertrophy with ligamentous infolding results in mild central stenosis and minimal bilateral foraminal narrowing      L2-L3:  Degenerative disc osteophyte complex with marginal osteophytes and facet hypertrophy with a superimposed left paramedian/foraminal protrusion combined result in moderate to severe central stenosis, severe bilateral lateral recess stenosis, severe   left and mild right foraminal narrowing      L3-L4:  Severe facet hypertrophy identified bilaterally with slight anterolisthesis and uncovering the posterior disc margin  Moderate to severe central and left foraminal narrowing, moderate to severe bilateral lateral recess stenosis identified      L4-L5:  Severe right and moderate left facet hypertrophy identified with degenerative disc osteophyte complex with marginal osteophytes  There is mild central and moderate right lateral recess stenosis  Mild right foraminal narrowing      L5-S1:  Mild facet hypertrophy  No significant central or foraminal narrowing      IMPRESSION:     Spondylotic degenerative changes are seen diffusely most prominently at L2-3 where there is severe central and bilateral lateral recess stenosis as well as moderate to severe left foraminal narrowing  Thecal sac is compressed with nerve root stretched   and redundant above this level  Correlate for left L2 radiculopathy      Spondylotic degenerative changes are seen at remaining levels including L3-4 where there is moderate to severe central and lateral recess stenosis      Workstation performed: [id number]     Study Result    Narrative & Impression   LUMBAR SPINE     INDICATION:   M54 5: Low back pain      COMPARISON:  None     VIEWS:  XR SPINE LUMBAR MINIMUM 4 VIEWS NON INJURY        FINDINGS:     There are 5 non rib bearing lumbar vertebral bodies       There is no evidence of acute fracture or destructive osseous lesion      There is levoscoliosis of the lumbar spine  There is mild anterolisthesis L3-L4       Multilevel ventral osteophytosis    Multilevel facet arthrosis      The pedicles appear intact      Soft tissues are unremarkable      IMPRESSION:     No acute osseous abnormality        Degenerative changes as described         Workstation performed: [id number]

## 2022-10-18 ENCOUNTER — OFFICE VISIT (OUTPATIENT)
Dept: PHYSICAL THERAPY | Facility: REHABILITATION | Age: 72
End: 2022-10-18
Payer: COMMERCIAL

## 2022-10-18 DIAGNOSIS — M48.062 SPINAL STENOSIS OF LUMBAR REGION WITH NEUROGENIC CLAUDICATION: Primary | ICD-10-CM

## 2022-10-18 PROCEDURE — 97140 MANUAL THERAPY 1/> REGIONS: CPT

## 2022-10-18 PROCEDURE — 97110 THERAPEUTIC EXERCISES: CPT

## 2022-10-18 PROCEDURE — 97112 NEUROMUSCULAR REEDUCATION: CPT

## 2022-10-18 NOTE — PROGRESS NOTES
Daily Note     Today's date: 10/18/2022  Patient name: Maria A Jackson  : 1950  MRN: [id number]  Referring provider: Grace Johnson PA-C  Dx:   Encounter Diagnosis     ICD-10-CM    1  Spinal stenosis of lumbar region with neurogenic claudication  M48 062        Start Time: 0815  Stop Time: 0855  Total time in clinic (min): 40 minutes    Subjective: Reports his back is doing alright having some groin pain on both sides  Objective: See treatment diary below    Assessment: Tolerated treatment well  Improvement in groin pain following manuals  Displayed good technique with exercises and did not report any discomfort during exercises  Patient would benefit from continued PT    Plan: Continue per plan of care        Precautions: DMII, anemia    Manuals 9/26 10/4 10/6 10/11 10/13 10/18   Lumbar dissociation mob  QD 3x10 ea QD 2x10 ea   QD 2x10 ea   Lumbar mobs  PA 3x30 GrIV  PA 3x30 GrIV     Lumbar gapping    QD 2x30 ea     Psoas STM      3' ea side   Assessment  QD QD 3' QD 5'  5'   Neuro Re-Ed 9/26 10/4 10/6 10/11 10/13 10/18   TrA         pallof  20x ea OJB 20x ea OJB 20x ea OJB 20x ea OJB press 2x10 OJB ea press    10x ea sidestep OJB   Curl up  15x5" 15x5"      Suitcase carry  3 laps 10lb ea 4 laps 10lb KB YJB susp 4 laps 10lb KB UJB suspend 6 laps 10lb YJB suspend 6 laps 10lb YJB suspend   bridges  3x10 YHB 2x10 YHB 25x YHB 3x10 YHB    clamshells  15x ea YHB 15x ea YHB 20x ea YHB 2x10 YHB ea    Record multfidi     15x ea 3" hold    Ther Ex 9/26 10/4 10/6 10/11 10/13 10/18   VG         Squats with TB         LTR HEP 20x ea 20x ea 2x10 ea     Standing lumbar ext HEP 3x10  3x10     treadmill  8' 8' 10' 15' 15'                              Ther Activity                           Gait Training                           Modalities

## 2022-10-20 ENCOUNTER — OFFICE VISIT (OUTPATIENT)
Dept: PHYSICAL THERAPY | Facility: REHABILITATION | Age: 72
End: 2022-10-20
Payer: COMMERCIAL

## 2022-10-20 DIAGNOSIS — M48.062 SPINAL STENOSIS OF LUMBAR REGION WITH NEUROGENIC CLAUDICATION: Primary | ICD-10-CM

## 2022-10-20 PROCEDURE — 97110 THERAPEUTIC EXERCISES: CPT

## 2022-10-20 PROCEDURE — 97140 MANUAL THERAPY 1/> REGIONS: CPT

## 2022-10-20 PROCEDURE — 97112 NEUROMUSCULAR REEDUCATION: CPT

## 2022-10-20 NOTE — PROGRESS NOTES
Daily Note     Today's date: 10/20/2022  Patient name: Radha Bradford  : 1950  MRN: [id number]  Referring provider: Vianey Rivera PA-C  Dx:   Encounter Diagnosis     ICD-10-CM    1  Spinal stenosis of lumbar region with neurogenic claudication  M48 062                   Subjective: Pt reports he has been about "50/50" with symptoms  Still has been having groin pain  Objective: See treatment diary below      Assessment: Tolerated treatment well  Tenderness to pressure and increased tone noted during psoas STM, but did begin to improves slightly  Both LB and groin pain respond well to manual treatment, but LB symptoms begin to return slightly with pallof press and sidestepping  Patient would benefit from continued PT      Plan: Continue per plan of care        Precautions: DMII, anemia    Manuals 10/20   10/11 10/13 10/18   Lumbar dissociation mob QD 2x10 ea     QD 2x10 ea   Lumbar mobs    PA 3x30 GrIV     Lumbar gapping    QD 2x30 ea     Psoas STM 4' ea     3' ea side   Assessment    QD 5'  5'   Neuro Re-Ed 10/20   10/11 10/13 10/18   TrA         pallof 2x10 OJB ea press    10x ea sidestep OJB   20x ea OJB 20x ea OJB press 2x10 OJB ea press    10x ea sidestep OJB   Curl up         Suitcase carry 6 laps 10lb YJB suspend   4 laps 10lb KB UJB suspend 6 laps 10lb YJB suspend 6 laps 10lb YJB suspend   bridges    25x YHB 3x10 YHB    clamshells    20x ea YHB 2x10 YHB ea    Record multfidi     15x ea 3" hold    Ther Ex 10/20   10/11 10/13 10/18   VG         Squats with TB         LTR    2x10 ea     Standing lumbar ext    3x10     treadmill 10'   10' 15' 15'                              Ther Activity                           Gait Training                           Modalities

## 2022-10-25 ENCOUNTER — OFFICE VISIT (OUTPATIENT)
Dept: PHYSICAL THERAPY | Facility: REHABILITATION | Age: 72
End: 2022-10-25
Payer: COMMERCIAL

## 2022-10-25 DIAGNOSIS — M48.062 SPINAL STENOSIS OF LUMBAR REGION WITH NEUROGENIC CLAUDICATION: Primary | ICD-10-CM

## 2022-10-25 PROCEDURE — 97112 NEUROMUSCULAR REEDUCATION: CPT

## 2022-10-25 PROCEDURE — 97110 THERAPEUTIC EXERCISES: CPT

## 2022-10-25 PROCEDURE — 97140 MANUAL THERAPY 1/> REGIONS: CPT

## 2022-10-25 NOTE — PROGRESS NOTES
Daily Note     Today's date: 10/25/2022  Patient name: Glenna Ratliff  : 1950  MRN: [id number]  Referring provider: Raman Chakraborty PA-C  Dx:   Encounter Diagnosis     ICD-10-CM    1  Spinal stenosis of lumbar region with neurogenic claudication  M48 062        Start Time: 817          Subjective: Patient reports that he has increased LB soreness and discomfort today across both sides, but primarily in the middle  Pain is graded 7-8/10 pre-treatment  Objective: See treatment diary below      Assessment: Tolerated treatment well  Increased resistance with clamshells this session  Increased tone in L>R brian this session  All Lumbar Mobs performed by Jerod Chavarria DPT  Patient demonstrated fatigue post treatment, exhibited good technique with therapeutic exercises and would benefit from continued PT      Plan: Continue per plan of care  Progress treatment as tolerated         Precautions: DMII, anemia    Manuals 10/20 10/25  10/11 10/13 10/18   Lumbar dissociation mob QD 2x10 ea QD    QD 2x10 ea   Lumbar mobs  QD  PA 3x30 GrIV     Lumbar gapping    QD 2x30 ea     Psoas STM 4' ea 5m JL    3' ea side   Assessment    QD 5'  5'   Neuro Re-Ed 10/20 10/25  10/11 10/13 10/18   TrA         pallof 2x10 OJB ea press    10x ea sidestep OJB 2x10 OJB ea press    10x ea sidestep OJB  20x ea OJB 20x ea OJB press 2x10 OJB ea press    10x ea sidestep OJB   Curl up         Suitcase carry 6 laps 10lb YJB suspend 6 laps ea 10lb YJB suspend  4 laps 10lb KB UJB suspend 6 laps 10lb YJB suspend 6 laps 10lb YJB suspend   bridges    25x YHB 3x10 YHB    clamshells  RHB 5" 2x10  20x ea YHB 2x10 YHB ea    Record multfidi     15x ea 3" hold    Ther Ex 10/20 10/25  10/11 10/13 10/18   VG         Squats with TB         LTR    2x10 ea     Standing lumbar ext    3x10     treadmill 10' 8 min  10' 15' 15'                              Ther Activity                           Gait Training                           Modalities

## 2022-10-27 ENCOUNTER — APPOINTMENT (OUTPATIENT)
Dept: PHYSICAL THERAPY | Facility: REHABILITATION | Age: 72
End: 2022-10-27

## 2022-11-01 ENCOUNTER — OFFICE VISIT (OUTPATIENT)
Dept: PHYSICAL THERAPY | Facility: REHABILITATION | Age: 72
End: 2022-11-01

## 2022-11-01 DIAGNOSIS — M48.062 SPINAL STENOSIS OF LUMBAR REGION WITH NEUROGENIC CLAUDICATION: Primary | ICD-10-CM

## 2022-11-01 NOTE — PROGRESS NOTES
Daily Note     Today's date: 2022  Patient name: Lalo Fung  : 1950  MRN: [id number]  Referring provider: Kris Urbina PA-C  Dx:   Encounter Diagnosis     ICD-10-CM    1  Spinal stenosis of lumbar region with neurogenic claudication  M48 062        Start Time: 0820  Stop Time: 0900  Total time in clinic (min): 40 minutes    Subjective: Patient reports that his back is a little bit better  Objective: See treatment diary below    Assessment: Tolerated treatment well  Patient demonstrated fatigue post treatment, exhibited good technique with therapeutic exercises and would benefit from continued PT    Plan: Continue per plan of care  Progress treatment as tolerated         Precautions: DMII, anemia    Manuals 10/20 10/25 11/1 10/11 10/13 10/18   Lumbar dissociation mob QD 2x10 ea QD    QD 2x10 ea   Lumbar mobs  QD  PA 3x30 GrIV     Lumbar gapping    QD 2x30 ea     Psoas STM 4' ea 5m JL    3' ea side   Assessment    QD 5'  5'   Neuro Re-Ed 10/20 10/25 11/1 10/11 10/13 10/18   TrA         pallof 2x10 OJB ea press    10x ea sidestep OJB 2x10 OJB ea press    10x ea sidestep OJB 2x10 OJB ea press    10x ea sidestep OJB 20x ea OJB 20x ea OJB press 2x10 OJB ea press    10x ea sidestep OJB   Curl up         Suitcase carry 6 laps 10lb YJB suspend 6 laps ea 10lb YJB suspend 4 laps ea 10lb KB 4 laps 10lb KB UJB suspend 6 laps 10lb YJB suspend 6 laps 10lb YJB suspend   bridges   3x10 RHB 25x YHB 3x10 YHB    clamshells  RHB 5" 2x10 RHB 2x10 5" hold 20x ea YHB 2x10 YHB ea    Record multfidi     15x ea 3" hold    Ther Ex 10/20 10/25 11/1 10/11 10/13 10/18   VG         Squats with TB         LTR    2x10 ea     Standing lumbar ext    3x10     treadmill 10' 8 min 15' 10' 15' 15'                              Ther Activity                           Gait Training                           Modalities

## 2022-11-03 ENCOUNTER — OFFICE VISIT (OUTPATIENT)
Dept: PHYSICAL THERAPY | Facility: REHABILITATION | Age: 72
End: 2022-11-03

## 2022-11-03 DIAGNOSIS — M48.062 SPINAL STENOSIS OF LUMBAR REGION WITH NEUROGENIC CLAUDICATION: Primary | ICD-10-CM

## 2022-11-03 NOTE — PROGRESS NOTES
Daily Note     Today's date: 11/3/2022  Patient name: Donna Seaman  : 1950  MRN: [id number]  Referring provider: See Canela PA-C  Dx:   Encounter Diagnosis     ICD-10-CM    1  Spinal stenosis of lumbar region with neurogenic claudication  M48 062        Start Time: 1706  Stop Time: 0900  Total time in clinic (min): 43 minutes    Subjective: Patient reports that his left sided back pain when he walks  Objective: See treatment diary below    Assessment: Tolerated treatment well  Restrictions in QL noted upon assessment  Improved pain and walking tolerance following STM  Patient demonstrated fatigue post treatment, exhibited good technique with therapeutic exercises and would benefit from continued PT    Plan: Continue per plan of care  Progress treatment as tolerated         Precautions: DMII, anemia    Manuals 10/20 10/25 11/1 11/3 10/13 10/18   Lumbar dissociation mob QD 2x10 ea QD  QD 2x10 L only  QD 2x10 ea   Lumbar mobs  QD  QD UPA L L3-4     Lumbar gapping         Psoas STM 4' ea 5m JL  QL 6'  3' ea side   Assessment    QD 5'  5'   Neuro Re-Ed 10/20 10/25 11/1 11/3 10/13 10/18   TrA         pallof 2x10 OJB ea press    10x ea sidestep OJB 2x10 OJB ea press    10x ea sidestep OJB 2x10 OJB ea press    10x ea sidestep OJB 2x10 OJB ea press    10x ea sidestep OJB 20x ea OJB press 2x10 OJB ea press    10x ea sidestep OJB   Curl up         Suitcase carry 6 laps 10lb YJB suspend 6 laps ea 10lb YJB suspend 4 laps ea 10lb KB  6 laps 10lb YJB suspend 6 laps 10lb YJB suspend   bridges   3x10 RHB  3x10 YHB    clamshells  RHB 5" 2x10 RHB 2x10 5" hold  2x10 YHB ea    Record multfidi     15x ea 3" hold    Ther Ex 10/20 10/25 11/1 11/3 10/13 10/18   VG         Squats with TB         LTR         Standing lumbar ext         treadmill 10' 8 min 15' 15' 15' 15'                              Ther Activity                           Gait Training                           Modalities

## 2022-11-08 ENCOUNTER — OFFICE VISIT (OUTPATIENT)
Dept: PHYSICAL THERAPY | Facility: REHABILITATION | Age: 72
End: 2022-11-08

## 2022-11-08 DIAGNOSIS — M48.062 SPINAL STENOSIS OF LUMBAR REGION WITH NEUROGENIC CLAUDICATION: Primary | ICD-10-CM

## 2022-11-08 NOTE — PROGRESS NOTES
Daily Note     Today's date: 2022  Patient name: Matilda Dean  : 1950  MRN: [id number]  Referring provider: Esau Kruse PA-C  Dx:   Encounter Diagnosis     ICD-10-CM    1  Spinal stenosis of lumbar region with neurogenic claudication  M48 062        Start Time: 818  Stop Time: 0859  Total time in clinic (min): 41 minutes    Subjective: Patient reports that his left sided back pain when he walks  Objective: See treatment diary below    Assessment: Tolerated treatment well  Restrictions in QL noted upon assessment  Improved pain and walking tolerance following STM  Patient demonstrated fatigue post treatment, exhibited good technique with therapeutic exercises and would benefit from continued PT    Plan: Continue per plan of care  Progress treatment as tolerated         Precautions: DMII, anemia    Manuals 10/20 10/25 11/1 11/3 11/8 10/18   Lumbar dissociation mob QD 2x10 ea QD  QD 2x10 L only  QD 2x10 ea   Lumbar mobs  QD  QD UPA L L3-4     Lumbar gapping     QD    Psoas STM 4' ea 5m JL  QL 6' QD 3' ea 3' ea side   S/L hip flexor stretch     2' ea QD    Assessment    QD 5' QD 5' 5'   Neuro Re-Ed 10/20 10/25 11/1 11/3 11/8 10/18   TrA         pallof 2x10 OJB ea press    10x ea sidestep OJB 2x10 OJB ea press    10x ea sidestep OJB 2x10 OJB ea press    10x ea sidestep OJB 2x10 OJB ea press    10x ea sidestep OJB 2x10 OJB ea press    10x ea sidestep OJB 2x10 OJB ea press    10x ea sidestep OJB   Curl up         Suitcase carry 6 laps 10lb YJB suspend 6 laps ea 10lb YJB suspend 4 laps ea 10lb KB   6 laps 10lb YJB suspend   bridges   3x10 RHB      clamshells  RHB 5" 2x10 RHB 2x10 5" hold      Record multfidi         Ther Ex 10/20 10/25 11/1 11/3  10/18   VG         Squats with TB         LTR     3x10    Standing lumbar ext     3x10    treadmill 10' 8 min 15' 15' 15' 15'                              Ther Activity                           Gait Training                           Modalities

## 2022-11-10 ENCOUNTER — OFFICE VISIT (OUTPATIENT)
Dept: PHYSICAL THERAPY | Facility: REHABILITATION | Age: 72
End: 2022-11-10

## 2022-11-10 DIAGNOSIS — M48.062 SPINAL STENOSIS OF LUMBAR REGION WITH NEUROGENIC CLAUDICATION: Primary | ICD-10-CM

## 2022-11-10 NOTE — PROGRESS NOTES
Daily Note     Today's date: 11/10/2022  Patient name: Lilli Pardo  : 1950  MRN: [id number]  Referring provider: Rae Crum PA-C  Dx:   Encounter Diagnosis     ICD-10-CM    1  Spinal stenosis of lumbar region with neurogenic claudication  M48 062                   Subjective: Pt reports his LB has been feeling a little better  Does feel like stretching and STM performed last visit helped  Objective: See treatment diary below      Assessment: Tolerated treatment well  Patient would benefit from continued PT      Plan: Continue per plan of care        Precautions: DMII, anemia    Manuals 10/20 10/25 11/1 11/3 11/8 11/10   Lumbar dissociation mob QD 2x10 ea QD  QD 2x10 L only     Lumbar mobs  QD  QD UPA L L3-4     Lumbar gapping     QD    Psoas STM 4' ea 5m JL  QL 6' QD 3' ea AFB 4' ea   S/L hip flexor stretch     2' ea QD AFB  30"x3 ea   Assessment    QD 5' QD 5'    Neuro Re-Ed 10/20 10/25 11/1 11/3 11/8 11/10   TrA         pallof 2x10 OJB ea press    10x ea sidestep OJB 2x10 OJB ea press    10x ea sidestep OJB 2x10 OJB ea press    10x ea sidestep OJB 2x10 OJB ea press    10x ea sidestep OJB 2x10 OJB ea press    10x ea sidestep OJB nv   Curl up         Suitcase carry 6 laps 10lb YJB suspend 6 laps ea 10lb YJB suspend 4 laps ea 10lb KB      bridges   3x10 RHB      clamshells  RHB 5" 2x10 RHB 2x10 5" hold      Record multfidi         Ther Ex 10/20 10/25 11/1 11/3     VG     11/8 11/10   Squats with TB         LTR     3x10 3x10   Standing lumbar ext     3x10    treadmill 10' 8 min 15' 15' 15' 15'                              Ther Activity                           Gait Training                           Modalities

## 2022-11-15 ENCOUNTER — OFFICE VISIT (OUTPATIENT)
Dept: PHYSICAL THERAPY | Facility: REHABILITATION | Age: 72
End: 2022-11-15

## 2022-11-15 DIAGNOSIS — M48.062 SPINAL STENOSIS OF LUMBAR REGION WITH NEUROGENIC CLAUDICATION: Primary | ICD-10-CM

## 2022-11-15 NOTE — PROGRESS NOTES
Daily Note     Today's date: 11/15/2022  Patient name: Johnny Joseph  : 1950  MRN: [id number]  Referring provider: Moni Burnett PA-C  Dx:   Encounter Diagnosis     ICD-10-CM    1  Spinal stenosis of lumbar region with neurogenic claudication  M48 062        Start Time: 0830  Stop Time: 0856  Total time in clinic (min): 26 minutes    Subjective: Pt reports his LB has been feeling better  Objective: See treatment diary below  Pt arrived 15 minutes late but was accommodated  Assessment: Tolerated treatment well  Exercises and manuals kept light today due to pt arriving late  Patient would benefit from continued PT    Plan: Continue per plan of care        Precautions: DMII, anemia    Manuals 11/15 10/25 11/1 11/3 11/8 11/10   Lumbar dissociation mob  QD  QD 2x10 L only     Lumbar mobs  QD  QD UPA L L3-4     Lumbar gapping     QD    Psoas STM  5m JL  QL 6' QD 3' ea AFB 4' ea   S/L hip flexor stretch QD 3x1' ea    2' ea QD AFB  30"x3 ea   Assessment QD 5'   QD 5' QD 5'    Neuro Re-Ed 11/15 10/25 11/1 11/3 11/8 11/10   TrA         pallof 2x10 presses OJB 2x10 OJB ea press    10x ea sidestep OJB 2x10 OJB ea press    10x ea sidestep OJB 2x10 OJB ea press    10x ea sidestep OJB 2x10 OJB ea press    10x ea sidestep OJB nv   Curl up         Suitcase carry  6 laps ea 10lb YJB suspend 4 laps ea 10lb KB      bridges 2x10 RHB   3x10 RHB      clamshells 2x10 3" RHB RHB 5" 2x10 RHB 2x10 5" hold      Record multfidi         Ther Ex 11/15 10/25 11/1 11/3     VG     11/8 11/10   Squats with TB         LTR     3x10 3x10   Standing lumbar ext     3x10    treadmill 10' 8 min 15' 15' 15' 15'                              Ther Activity                           Gait Training                           Modalities

## 2022-11-17 ENCOUNTER — APPOINTMENT (OUTPATIENT)
Dept: PHYSICAL THERAPY | Facility: REHABILITATION | Age: 72
End: 2022-11-17

## 2023-01-16 ENCOUNTER — TELEPHONE (OUTPATIENT)
Dept: PAIN MEDICINE | Facility: CLINIC | Age: 73
End: 2023-01-16

## 2023-01-25 NOTE — PROGRESS NOTES
Assessment:  1  Lumbar radiculopathy    2  Lumbar spondylosis    3  Spinal stenosis of lumbar region with neurogenic claudication    4  Left groin pain        Plan:  1  Patient will be scheduled for a bilateral L3 TFESI to address the inflammatory component of the patient's pain  Complete risks and benefits including bleeding, infection, tissue reaction, nerve injury and allergic reaction were discussed  The patient was agreeable and verbalized an understanding  2  I will order an x-ray of the left hip  3  I will initiate pregabalin 50 mg nightly and titrate up to 3 times daily dosing for neuropathic complaints  I discussed with the patient the type of medication it is, how it works, and that it requires a titration process that is specific to each individual  I reviewed with the patient that it may take 3-4 weeks for the medication's effects to be noticed and that it should never be abruptly stopped  Possible side effects include but are not limited to; vertigo, lethargy, nausea, and edema of the extremities  Advised the patient to call our office if they experience any side effects  The patient verbalized an understanding  4   Patient finds relief with over-the-counter Aleve  I will prescribe naproxen 500 mg twice daily as needed pain  He was advised to take the medication with food to avoid GI upset and to avoid other NSAIDs while on this medication  5  Continue with home exercise program as taught by physical therapy  6  May consider lumbar medial branch blocks in the future  7  Follow-up after procedure or sooner if needed    History of Present Illness:     The patient is a 74 y o  male with a history of hypertension, diabetes, left L3-4 discectomy in 2012 last seen on 10/14/2022 who presents for a follow up office visit in regards to chronic low back pain that radiates into the lower abdomen, bilateral groins, and anterior aspect of the thighs, left greater than right which will occasionally radiate to the left great toe  patient does endorse some subjective weakness in the left lower extremity  He denies bowel or bladder incontinence or saddle anesthesia  He was evaluated by neurosurgery who recommended he continue treatment with our practice and exhaust conservative treatment before considering further surgical intervention  He has done numerous rounds of physical therapy with transient relief  He did try gabapentin which caused GI upset  Has not found much relief with Tylenol or NSAIDs  MRI of the lumbar spine shows multilevel degenerative disc disease and spondylosis  Severe central and recess stenosis at L2-3  Severe left and mild right foraminal stenosis  Moderate to severe central stenosis at L3-4  Severe left foraminal stenosis  Moderate to severe bilateral recess stenosis  Mild central and right foraminal stenosis at L4-5 with moderate right recess stenosis  The patient rates his pain a 9 out of 10 on the numeric pain rating scale  He constantly has pain in the evening which is described as sharp, pressure-like, shooting, numbness and pins-and-needles    I have personally reviewed and/or updated the patient's past medical history, past surgical history, family history, social history, current medications, allergies, and vital signs today  Review of Systems:    Review of Systems   Respiratory: Negative for shortness of breath  Cardiovascular: Negative for chest pain  Gastrointestinal: Negative for constipation, diarrhea, nausea and vomiting  Musculoskeletal: Positive for gait problem  Negative for arthralgias, joint swelling and myalgias  Skin: Negative for rash  Neurological: Negative for dizziness, seizures and weakness  All other systems reviewed and are negative          Past Medical History:   Diagnosis Date   • Acute biliary pancreatitis 10/18/2020   • Anemia     mild anemia   • Back pain    • Heart burn    • History of chest pain    • Hypertension    • Palpitations    • Problems with hearing    • Problems with swallowing    • Sickle cell trait ([address] )     last assessed 10/31/14   • Snoring    • SOB (shortness of breath)    • Spinal stenosis     ddd spinal stenosis   • Swelling        Past Surgical History:   Procedure Laterality Date   • BACK SURGERY      lower back   • COLONOSCOPY  01/2016    due 2026, [facility] [place]  12/07       Family History   Problem Relation Age of Onset   • No Known Problems Mother    • Diabetes Sister    • Sickle cell anemia Family        Social History     Occupational History   • Not on file   Tobacco Use   • Smoking status: Never   • Smokeless tobacco: Never   Vaping Use   • Vaping Use: Never used   Substance and Sexual Activity   • Alcohol use: No   • Drug use: No   • Sexual activity: Not on file         Current Outpatient Medications:   •  amLODIPine (NORVASC) 10 mg tablet, TAKE 1 TABLET BY MOUTH EVERY DAY, Disp: 90 tablet, Rfl: 3  •  aspirin 81 mg chewable tablet, Chew 1 tablet daily, Disp: , Rfl:   •  losartan (COZAAR) 100 MG tablet, Take 100 mg by mouth daily , Disp: , Rfl:   •  naproxen (NAPROSYN) 500 mg tablet, Take 1 tablet (500 mg total) by mouth 2 (two) times a day as needed for mild pain, Disp: 60 tablet, Rfl: 1  •  pregabalin (LYRICA) 50 mg capsule, Take 1 PO HS x 5 days, then 1 PO BID x 5 days, then 1 PO TID, Disp: 90 capsule, Rfl: 1  •  rosuvastatin (CRESTOR) 10 MG tablet, Take 1 tablet (10 mg total) by mouth daily, Disp: 90 tablet, Rfl: 3  •  Contour Next Test test strip, TEST 3 TIMES DAILY (Patient not taking: Reported on 10/14/2022), Disp: 300 strip, Rfl: 3  •  Cyanocobalamin 1000 MCG CAPS, Take 1,000 mcg by mouth daily, Disp: , Rfl:   •  Microlet Lancets MISC, TEST 3 TIMES DAILY (Patient not taking: Reported on 10/14/2022), Disp: 300 each, Rfl: 3    Allergies   Allergen Reactions   • Metformin    • Levofloxacin Rash     Other reaction(s): Unknown Allergic Reaction       Physical Exam:    /78   Pulse 66   Wt 88 5 kg (195 lb)   BMI 31 47 kg/m²     Constitutional:normal, well developed, well nourished, alert, in no distress and non-toxic and no overt pain behavior  Eyes:anicteric  HEENT:grossly intact  Neck:supple, symmetric, trachea midline and no masses   Pulmonary:even and unlabored  Cardiovascular:No edema or pitting edema present  Skin:Normal without rashes or lesions and well hydrated  Psychiatric:Mood and affect appropriate  Neurologic:Cranial Nerves II-XII grossly intact  Musculoskeletal:Antalgic gait but steady without assistive devices  Bilateral lower extremity strength 5 out of 5 in all muscle groups  Sensation intact to light touch in L3-S1 dermatomes bilaterally  Positive straight leg raise on the left and negative on the right  Positive Marcos's test bilaterally  Internal and external rotation does not reproduce bilateral groin pain    Stinchfield test on the left and negative on the right      Imaging  XR hip/pelv 2-3 vws left if performed    (Results Pending)   FL spine and pain procedure    (Results Pending)         Orders Placed This Encounter   Procedures   • XR hip/pelv 2-3 vws left if performed   • FL spine and pain procedure

## 2023-01-26 ENCOUNTER — OFFICE VISIT (OUTPATIENT)
Dept: PAIN MEDICINE | Facility: CLINIC | Age: 73
End: 2023-01-26

## 2023-01-26 VITALS
BODY MASS INDEX: 31.47 KG/M2 | SYSTOLIC BLOOD PRESSURE: 160 MMHG | HEART RATE: 66 BPM | WEIGHT: 195 LBS | DIASTOLIC BLOOD PRESSURE: 78 MMHG

## 2023-01-26 DIAGNOSIS — M54.16 LUMBAR RADICULOPATHY: Primary | ICD-10-CM

## 2023-01-26 DIAGNOSIS — R10.32 LEFT GROIN PAIN: ICD-10-CM

## 2023-01-26 DIAGNOSIS — M48.062 SPINAL STENOSIS OF LUMBAR REGION WITH NEUROGENIC CLAUDICATION: ICD-10-CM

## 2023-01-26 DIAGNOSIS — M47.816 LUMBAR SPONDYLOSIS: ICD-10-CM

## 2023-01-26 RX ORDER — NAPROXEN 500 MG/1
500 TABLET ORAL 2 TIMES DAILY PRN
Qty: 60 TABLET | Refills: 1 | Status: SHIPPED | OUTPATIENT
Start: 2023-01-26

## 2023-01-26 RX ORDER — PREGABALIN 50 MG/1
CAPSULE ORAL
Qty: 90 CAPSULE | Refills: 1 | Status: SHIPPED | OUTPATIENT
Start: 2023-01-26

## 2023-01-30 RX ORDER — AMLODIPINE BESYLATE 5 MG/1
5 TABLET ORAL DAILY
COMMUNITY
Start: 2022-11-11 | End: 2023-01-31

## 2023-01-30 RX ORDER — GABAPENTIN 300 MG/1
CAPSULE ORAL
COMMUNITY
Start: 2023-01-16 | End: 2023-01-31

## 2023-01-31 ENCOUNTER — OFFICE VISIT (OUTPATIENT)
Dept: FAMILY MEDICINE CLINIC | Facility: CLINIC | Age: 73
End: 2023-01-31

## 2023-01-31 VITALS
HEIGHT: 66 IN | WEIGHT: 196.4 LBS | SYSTOLIC BLOOD PRESSURE: 142 MMHG | DIASTOLIC BLOOD PRESSURE: 80 MMHG | OXYGEN SATURATION: 98 % | RESPIRATION RATE: 16 BRPM | BODY MASS INDEX: 31.57 KG/M2 | TEMPERATURE: 98 F | HEART RATE: 56 BPM

## 2023-01-31 DIAGNOSIS — Z12.5 PROSTATE CANCER SCREENING: ICD-10-CM

## 2023-01-31 DIAGNOSIS — E11.69 DIABETES MELLITUS TYPE 2 IN OBESE (HCC): Chronic | ICD-10-CM

## 2023-01-31 DIAGNOSIS — I10 BENIGN ESSENTIAL HYPERTENSION: ICD-10-CM

## 2023-01-31 DIAGNOSIS — D57.3 SICKLE CELL TRAIT (HCC): ICD-10-CM

## 2023-01-31 DIAGNOSIS — E78.2 MIXED HYPERLIPIDEMIA: ICD-10-CM

## 2023-01-31 DIAGNOSIS — M54.16 LUMBAR RADICULOPATHY: ICD-10-CM

## 2023-01-31 DIAGNOSIS — M48.062 SPINAL STENOSIS OF LUMBAR REGION WITH NEUROGENIC CLAUDICATION: Primary | ICD-10-CM

## 2023-01-31 DIAGNOSIS — E66.9 DIABETES MELLITUS TYPE 2 IN OBESE (HCC): Chronic | ICD-10-CM

## 2023-01-31 DIAGNOSIS — M47.816 LUMBAR SPONDYLOSIS: ICD-10-CM

## 2023-01-31 RX ORDER — LOSARTAN POTASSIUM 100 MG/1
100 TABLET ORAL DAILY
Qty: 90 TABLET | Refills: 3 | Status: SHIPPED | OUTPATIENT
Start: 2023-01-31

## 2023-01-31 RX ORDER — AMLODIPINE BESYLATE 10 MG/1
10 TABLET ORAL DAILY
Qty: 90 TABLET | Refills: 3 | Status: SHIPPED | OUTPATIENT
Start: 2023-01-31

## 2023-01-31 RX ORDER — ROSUVASTATIN CALCIUM 10 MG/1
10 TABLET, COATED ORAL DAILY
Qty: 90 TABLET | Refills: 3 | Status: SHIPPED | OUTPATIENT
Start: 2023-01-31

## 2023-01-31 NOTE — PROGRESS NOTES
Name: Re Oliveira      : 1950      MRN: [id number]  Encounter Provider: Jurgen Camarillo MD  Encounter Date: 2023   Encounter department: [address] [place] [address]      1  Spinal stenosis of lumbar region with neurogenic claudication  Assessment & Plan:  L3-L4 lateral  discectomy in summer of 2012 - Naomy MONTOYA  Persistent low back pain with left and now development of right lower extremity sciatica  Recent consultations by [facility] neurosurgery and pain management, pending injection  Patient is reluctant to use Lyrica, he experienced side effects due to gabapentin in the past   He would like to learn more definitive measures for treatment of low back pain  Counseled patient regarding forthcoming injection and possible relief  Referral to [facility] orthopedic/spine surgery for consultation  Continue PT  Orders:  -     Ambulatory referral to Orthopedic Surgery; Future  -     Ambulatory referral to Physical Therapy; Future    2  Lumbar spondylosis  -     Ambulatory referral to Orthopedic Surgery; Future  -     Ambulatory referral to Physical Therapy; Future    3  Lumbar radiculopathy  -     Ambulatory referral to Orthopedic Surgery; Future  -     Ambulatory referral to Physical Therapy; Future    4  Diabetes mellitus type 2 in obese [facility])  Assessment & Plan:    Lab Results   Component Value Date    HGBA1C 6 5 (H) 2022     Diet controlled, continue monitoring    Orders:  -     Hemoglobin A1C; Future; Expected date: 2023  -     Comprehensive metabolic panel; Future; Expected date: 2023  -     CBC and Platelet; Future; Expected date: 2023  -     Microalbumin / creatinine urine ratio; Future; Expected date: 2023  -     Hemoglobin A1C  -     Comprehensive metabolic panel  -     CBC and Platelet  -     Microalbumin / creatinine urine ratio    5   Mixed hyperlipidemia  Assessment & Plan:  Continue statin follow lipid panel and LFTs    Orders:  -     Lipid Panel with Direct LDL reflex; Future; Expected date: 01/31/2023  -     TSH, 3rd generation with Free T4 reflex; Future; Expected date: 01/31/2023  -     Lipid Panel with Direct LDL reflex  -     TSH, 3rd generation with Free T4 reflex  -     rosuvastatin (CRESTOR) 10 MG tablet; Take 1 tablet (10 mg total) by mouth daily    6  Prostate cancer screening  -     PSA, Total Screen; Future    7  Benign essential hypertension  Assessment & Plan:        Orders:  -     amLODIPine (NORVASC) 10 mg tablet; Take 1 tablet (10 mg total) by mouth daily  -     losartan (COZAAR) 100 MG tablet; Take 1 tablet (100 mg total) by mouth daily    8  Sickle cell trait (HCC)  Assessment & Plan:  Monitor CBC             Subjective     Low back pain chronic, persistent  Sciatica B/L ( Right  sciatica is new), chronic left lower extremity sciatica  H/o L3/4 discectomy in 2012 at [facility]     Now back pain radiates to the lower abdominal area B/L - this is a new symptoms that has started 1 month ago  Patient denies any bowel or bladder dysfunction  Appetite is normal   No dyspepsia  No abdominal pain per se  Zickel therapy has helped, patient would like to continue   Seen by [facility] neurosurgery and subsequently referred to pain management, office visit 1/26/23, consult notes reviewed  Patient will be scheduled for a bilateral L3 TFESI to address the inflammatory component of the pain  Patient had injections in the past  They only have helped temporarily  Pain with sitting down and bending /then extending  MRI 8/2022  Spondylotic degenerative changes are seen diffusely most prominently at L2-3 where there is severe central and bilateral lateral recess stenosis as well as moderate to severe left foraminal narrowing  Thecal sac is compressed with nerve root stretched   and redundant above this level  Correlate for left L2 radiculopathy  Spondylotic degenerative changes are seen at remaining levels including L3-4 where there is moderate to severe central and lateral recess stenosis  Patient is on daily medications for hypertension and hyperlipidemia  Type 2 diabetes is diet controlled  He is due for routine blood work                 Back Pain  Associated symptoms include numbness  Review of Systems   Constitutional: Negative  HENT: Negative  Eyes: Negative  Respiratory: Negative  Cardiovascular: Negative  Gastrointestinal: Negative  As per HPI   Musculoskeletal: Positive for back pain  Neurological: Positive for numbness          As per HPI       Past Medical History:   Diagnosis Date   • Acute biliary pancreatitis 10/18/2020   • Anemia     mild anemia   • Back pain    • Heart burn    • History of chest pain    • Hypertension    • Palpitations    • Problems with hearing    • Problems with swallowing    • Sickle cell trait ([address] )     last assessed 10/31/14   • Snoring    • SOB (shortness of breath)    • Spinal stenosis     ddd spinal stenosis   • Swelling      Past Surgical History:   Procedure Laterality Date   • BACK SURGERY      lower back   • COLONOSCOPY  01/2016    due 2026, Suman Hernandez  12/07     Family History   Problem Relation Age of Onset   • No Known Problems Mother    • Diabetes Sister    • Sickle cell anemia Family      Social History     Socioeconomic History   • Marital status: /Civil Union     Spouse name: None   • Number of children: None   • Years of education: None   • Highest education level: None   Occupational History   • None   Tobacco Use   • Smoking status: Never   • Smokeless tobacco: Never   Vaping Use   • Vaping Use: Never used   Substance and Sexual Activity   • Alcohol use: No   • Drug use: No   • Sexual activity: None   Other Topics Concern   • None   Social History Narrative   • None     Social Determinants of Health     Financial Resource Strain: Not on file   Food Insecurity: Not on file   Transportation Needs: Not on file   Physical Activity: Not on file   Stress: Not on file   Social Connections: Not on file   Intimate Partner Violence: Not on file   Housing Stability: Not on file     Current Outpatient Medications on File Prior to Visit   Medication Sig   • aspirin 81 mg chewable tablet Chew 1 tablet daily   • Contour Next Test test strip TEST 3 TIMES DAILY   • Cyanocobalamin 1000 MCG CAPS Take 1,000 mcg by mouth daily   • Microlet Lancets MISC TEST 3 TIMES DAILY   • naproxen (NAPROSYN) 500 mg tablet Take 1 tablet (500 mg total) by mouth 2 (two) times a day as needed for mild pain   • pregabalin (LYRICA) 50 mg capsule Take 1 PO HS x 5 days, then 1 PO BID x 5 days, then 1 PO TID     Allergies   Allergen Reactions   • Levofloxacin Rash     Other reaction(s): Unknown Allergic Reaction     Immunization History   Administered Date(s) Administered   • COVID-19 PFIZER VACCINE 0 3 ML IM 02/21/2021, 03/13/2021, 11/19/2021   • INFLUENZA 10/12/2007, 09/25/2009, 01/17/2014, 10/22/2014, 09/20/2018, 11/30/2022   • Influenza Split High Dose Preservative Free IM 10/14/2016, 10/27/2017   • Influenza, high dose seasonal 0 7 mL 09/20/2018, 09/10/2019, 09/16/2020   • Influenza, seasonal, injectable 10/12/2007, 09/25/2009, 01/17/2014, 10/22/2014   • Pneumococcal Conjugate 13-Valent 10/14/2016   • Pneumococcal Polysaccharide PPV23 04/26/2013       Objective     /80 (BP Location: Left arm, Patient Position: Sitting, Cuff Size: Large)   Pulse 56   Temp 98 °F (36 7 °C) (Temporal)   Resp 16   Ht 5' 6" (1 676 m)   Wt 89 1 kg (196 lb 6 4 oz)   SpO2 98%   BMI 31 70 kg/m²     Physical Exam  Vitals and nursing note reviewed  Constitutional:       General: He is not in acute distress  Appearance: Normal appearance  He is well-developed  He is not ill-appearing  HENT:      Head: Normocephalic and atraumatic  Eyes:      Conjunctiva/sclera: Conjunctivae normal    Neck:      Vascular: No carotid bruit     Cardiovascular:      Rate and Rhythm: Normal rate and regular rhythm  Heart sounds: Normal heart sounds  No murmur heard  Pulmonary:      Effort: Pulmonary effort is normal  No respiratory distress  Breath sounds: Normal breath sounds  No wheezing or rales  Abdominal:      General: Bowel sounds are normal  There is no distension or abdominal bruit  Palpations: Abdomen is soft  Tenderness: There is no abdominal tenderness  There is no guarding or rebound  Hernia: No hernia is present  Musculoskeletal:         General: Normal range of motion  Cervical back: Neck supple  Right lower leg: No edema  Left lower leg: No edema  Comments: Antalgic gait   Neurological:      Mental Status: He is alert and oriented to person, place, and time  Cranial Nerves: No cranial nerve deficit  Psychiatric:         Mood and Affect: Mood normal          Behavior: Behavior normal          Thought Content:  Thought content normal        Shelly Bazan MD

## 2023-02-04 NOTE — ASSESSMENT & PLAN NOTE
Lab Results   Component Value Date    HGBA1C 6 5 (H) 08/05/2022     Diet controlled, continue monitoring

## 2023-02-04 NOTE — ASSESSMENT & PLAN NOTE
L3-L4 lateral  discectomy in summer of 2012 - Lynette MONTOYA  Persistent low back pain with left and now development of right lower extremity sciatica  Recent consultations by [facility] neurosurgery and pain management, pending injection  Patient is reluctant to use Lyrica, he experienced side effects due to gabapentin in the past   He would like to learn more definitive measures for treatment of low back pain  Counseled patient regarding forthcoming injection and possible relief  Referral to [facility] orthopedic/spine surgery for consultation    Continue PT

## 2023-02-22 LAB
ALBUMIN SERPL-MCNC: 4.3 G/DL (ref 3.6–5.1)
ALBUMIN/CREAT UR: 693 MCG/MG CREAT
ALBUMIN/GLOB SERPL: 1.9 (CALC) (ref 1–2.5)
ALP SERPL-CCNC: 72 U/L (ref 35–144)
ALT SERPL-CCNC: 28 U/L (ref 9–46)
AST SERPL-CCNC: 21 U/L (ref 10–35)
BASOPHILS # BLD AUTO: 30 CELLS/UL (ref 0–200)
BASOPHILS NFR BLD AUTO: 0.8 %
BILIRUB SERPL-MCNC: 1 MG/DL (ref 0.2–1.2)
BUN SERPL-MCNC: 13 MG/DL (ref 7–25)
BUN/CREAT SERPL: ABNORMAL (CALC) (ref 6–22)
CALCIUM SERPL-MCNC: 8.9 MG/DL (ref 8.6–10.3)
CHLORIDE SERPL-SCNC: 107 MMOL/L (ref 98–110)
CHOLEST SERPL-MCNC: 124 MG/DL
CHOLEST/HDLC SERPL: 2.6 (CALC)
CO2 SERPL-SCNC: 27 MMOL/L (ref 20–32)
CREAT SERPL-MCNC: 0.74 MG/DL (ref 0.7–1.28)
CREAT UR-MCNC: 42 MG/DL (ref 20–320)
EOSINOPHIL # BLD AUTO: 48 CELLS/UL (ref 15–500)
EOSINOPHIL NFR BLD AUTO: 1.3 %
ERYTHROCYTE [DISTWIDTH] IN BLOOD BY AUTOMATED COUNT: 13.9 % (ref 11–15)
GFR/BSA.PRED SERPLBLD CYS-BASED-ARV: 95 ML/MIN/1.73M2
GLOBULIN SER CALC-MCNC: 2.3 G/DL (CALC) (ref 1.9–3.7)
GLUCOSE SERPL-MCNC: 154 MG/DL (ref 65–99)
HBA1C MFR BLD: 6.3 % OF TOTAL HGB
HCT VFR BLD AUTO: 38.8 % (ref 38.5–50)
HDLC SERPL-MCNC: 48 MG/DL
HGB BLD-MCNC: 13.3 G/DL (ref 13.2–17.1)
LDLC SERPL CALC-MCNC: 60 MG/DL (CALC)
LYMPHOCYTES # BLD AUTO: 1288 CELLS/UL (ref 850–3900)
LYMPHOCYTES NFR BLD AUTO: 34.8 %
MCH RBC QN AUTO: 31.7 PG (ref 27–33)
MCHC RBC AUTO-ENTMCNC: 34.3 G/DL (ref 32–36)
MCV RBC AUTO: 92.4 FL (ref 80–100)
MICROALBUMIN UR-MCNC: 29.1 MG/DL
MONOCYTES # BLD AUTO: 377 CELLS/UL (ref 200–950)
MONOCYTES NFR BLD AUTO: 10.2 %
NEUTROPHILS # BLD AUTO: 1957 CELLS/UL (ref 1500–7800)
NEUTROPHILS NFR BLD AUTO: 52.9 %
NONHDLC SERPL-MCNC: 76 MG/DL (CALC)
PLATELET # BLD AUTO: 136 THOUSAND/UL (ref 140–400)
PMV BLD REES-ECKER: 11.4 FL (ref 7.5–12.5)
POTASSIUM SERPL-SCNC: 4.1 MMOL/L (ref 3.5–5.3)
PROT SERPL-MCNC: 6.6 G/DL (ref 6.1–8.1)
PSA SERPL-MCNC: 1.1 NG/ML
RBC # BLD AUTO: 4.2 MILLION/UL (ref 4.2–5.8)
SODIUM SERPL-SCNC: 141 MMOL/L (ref 135–146)
TRIGL SERPL-MCNC: 75 MG/DL
TSH SERPL-ACNC: 1.96 MIU/L (ref 0.4–4.5)
WBC # BLD AUTO: 3.7 THOUSAND/UL (ref 3.8–10.8)

## 2023-02-23 ENCOUNTER — TELEPHONE (OUTPATIENT)
Dept: FAMILY MEDICINE CLINIC | Facility: CLINIC | Age: 73
End: 2023-02-23

## 2023-02-23 DIAGNOSIS — M48.07 SPINAL STENOSIS OF LUMBOSACRAL REGION: ICD-10-CM

## 2023-02-23 DIAGNOSIS — E11.29 MICROALBUMINURIA DUE TO TYPE 2 DIABETES MELLITUS (HCC): Primary | ICD-10-CM

## 2023-02-23 DIAGNOSIS — R80.9 MICROALBUMINURIA DUE TO TYPE 2 DIABETES MELLITUS (HCC): Primary | ICD-10-CM

## 2023-02-23 DIAGNOSIS — M96.1 POSTLAMINECTOMY SYNDROME OF LUMBAR REGION: ICD-10-CM

## 2023-02-23 RX ORDER — NABUMETONE 750 MG/1
TABLET, FILM COATED ORAL
Qty: 180 TABLET | Refills: 3 | OUTPATIENT
Start: 2023-02-23

## 2023-02-23 NOTE — TELEPHONE ENCOUNTER
Please contact patient  I sent him detailed [employer] message regarding recent blood work and urine results  He is kindly ask him to review and proceed  I am placing a referral to kidney specialist due to elevated urine protein      Thank you

## 2023-02-27 ENCOUNTER — TELEPHONE (OUTPATIENT)
Dept: PAIN MEDICINE | Facility: CLINIC | Age: 73
End: 2023-02-27

## 2023-02-27 NOTE — TELEPHONE ENCOUNTER
S/w the patient in regards to the procedure  He wanted to know if this procedure would affect his kidney disease  Reviewed that the contrast dye would not affect his kidney disease  Answered his multiple questions in regards to the procedure and why type of medication would be injected into the epidural space  I spelled your name to him  Reviewed that the physician would review everything with him at the time of his visit  He stated the medication prescribed is not helping  Reviewed that he was switched form gabapentin to Lyrica  He wanted to know when he would be scheduled for the procedure and again  Reviewed that as soon as we know, he will be one of the first called to be rescheduled   Just FYI

## 2023-02-27 NOTE — TELEPHONE ENCOUNTER
Agree with RN  Contrast dye will not affect kidney disease    Procedure  will call to reschedule procedure

## 2023-02-28 ENCOUNTER — TELEPHONE (OUTPATIENT)
Dept: NEPHROLOGY | Facility: CLINIC | Age: 73
End: 2023-02-28

## 2023-02-28 NOTE — TELEPHONE ENCOUNTER
New Patient Intake Form   Patient Details   Elvia Laura     1950     [id number]     Insurance Information   Name [facility]   Does the patient need an insurance referral? no   If patient has Barryadi Benton, please ask if they will be using their Abel Mosses  Appointment Information   Who is calling to schedule? If not patient, what is callers name? Patient   Referring Provider Dr Lizette Gregorio    Reason for Appt (Diagnosis) Microalbuminuria due to type 2 diabetes mellitus [facility])   Does Patient have labs/urine done at [facility]? If not, where do they go? List the date of last lab / urine yes   Has patient been hospitalized recently? If yes, list name and location of hospital they were in no   Has patient been seen by a Nephrologist before? If yes, list name, location and phone number no   Has the patient had renal imaging done? If so, list the most recent date and type of imaging no    Does patient have a history of Kidney Stones? no   Appointment Details   Is there a referral on file?  yes    Appointment Date 4/18/23   Location  [place]   Miscellaneous   added to cancellation list for sooner appointment

## 2023-02-28 NOTE — TELEPHONE ENCOUNTER
Patient called saying that he is unable to make his procedure at 1:00 PM and is asking if the office can can give him back a call to reschedule his procedure

## 2023-03-20 ENCOUNTER — TELEPHONE (OUTPATIENT)
Dept: NEPHROLOGY | Facility: CLINIC | Age: 73
End: 2023-03-20

## 2023-03-21 ENCOUNTER — HOSPITAL ENCOUNTER (OUTPATIENT)
Dept: RADIOLOGY | Facility: CLINIC | Age: 73
Discharge: HOME | End: 2023-03-21

## 2023-03-21 VITALS
SYSTOLIC BLOOD PRESSURE: 149 MMHG | TEMPERATURE: 97.2 F | OXYGEN SATURATION: 100 % | RESPIRATION RATE: 18 BRPM | HEART RATE: 65 BPM | DIASTOLIC BLOOD PRESSURE: 74 MMHG

## 2023-03-21 DIAGNOSIS — M54.16 LUMBAR RADICULOPATHY: ICD-10-CM

## 2023-03-21 RX ORDER — LIDOCAINE HYDROCHLORIDE 10 MG/ML
5 INJECTION, SOLUTION EPIDURAL; INFILTRATION; INTRACAUDAL; PERINEURAL ONCE
Status: DISCONTINUED | OUTPATIENT
Start: 2023-03-21 | End: 2023-03-21

## 2023-03-21 RX ORDER — PAPAVERINE HCL 150 MG
20 CAPSULE, EXTENDED RELEASE ORAL ONCE
Status: DISCONTINUED | OUTPATIENT
Start: 2023-03-21 | End: 2023-03-21

## 2023-03-21 RX ORDER — PAPAVERINE HCL 150 MG
15 CAPSULE, EXTENDED RELEASE ORAL ONCE
Status: DISCONTINUED | OUTPATIENT
Start: 2023-03-21 | End: 2023-03-21

## 2023-03-21 NOTE — H&P
History of Present Illness: The patient is a 74 y o  male who presents with complaints of low back and leg pain      Past Medical History:   Diagnosis Date   • Acute biliary pancreatitis 10/18/2020   • Anemia     mild anemia   • Back pain    • Heart burn    • History of chest pain    • Hypertension    • Palpitations    • Problems with hearing    • Problems with swallowing    • Sickle cell trait ([address] )     last assessed 10/31/14   • Snoring    • SOB (shortness of breath)    • Spinal stenosis     ddd spinal stenosis   • Swelling        Past Surgical History:   Procedure Laterality Date   • BACK SURGERY      lower back   • COLONOSCOPY  01/2016    due 2026, Nella Hernandez  12/07         Current Outpatient Medications:   •  amLODIPine (NORVASC) 10 mg tablet, Take 1 tablet (10 mg total) by mouth daily, Disp: 90 tablet, Rfl: 3  •  aspirin 81 mg chewable tablet, Chew 1 tablet daily, Disp: , Rfl:   •  Contour Next Test test strip, TEST 3 TIMES DAILY, Disp: 300 strip, Rfl: 3  •  Cyanocobalamin 1000 MCG CAPS, Take 1,000 mcg by mouth daily, Disp: , Rfl:   •  losartan (COZAAR) 100 MG tablet, Take 1 tablet (100 mg total) by mouth daily, Disp: 90 tablet, Rfl: 3  •  Microlet Lancets MISC, TEST 3 TIMES DAILY, Disp: 300 each, Rfl: 3  •  naproxen (NAPROSYN) 500 mg tablet, Take 1 tablet (500 mg total) by mouth 2 (two) times a day as needed for mild pain, Disp: 60 tablet, Rfl: 1  •  pregabalin (LYRICA) 50 mg capsule, Take 1 PO HS x 5 days, then 1 PO BID x 5 days, then 1 PO TID, Disp: 90 capsule, Rfl: 1  •  rosuvastatin (CRESTOR) 10 MG tablet, Take 1 tablet (10 mg total) by mouth daily, Disp: 90 tablet, Rfl: 3    Allergies   Allergen Reactions   • Iodinated Contrast Media Other (See Comments)   • Levofloxacin Rash     Other reaction(s): Unknown Allergic Reaction       Physical Exam:   Vitals:    03/21/23 0757   BP: 149/74   Pulse: 65   Resp: 18   Temp: (!) 97 2 °F (36 2 °C)   SpO2: 100%     General: Awake, Alert, Oriented x 3, Mood and affect appropriate  Respiratory: Respirations even and unlabored  Cardiovascular: Peripheral pulses intact; no edema  Musculoskeletal Exam: Bilateral lumbar paraspinals tender to palpation    ASA Score: 3    Patient/Chart Verification  Patient ID Verified: Verbal  ID Band Applied: No  Consents Confirmed: Procedural, To be obtained in the Pre-Procedure area  Interval H&P(within 24 hr) Complete (required for Outpatients and Surgery Admit only): To be obtained in the Pre-Procedure area  Allergies Reviewed: Yes  Anticoag/NSAID held?: NA  Currently on antibiotics?: No    Assessment:   1   Lumbar radiculopathy        Plan: B/L L3 TFESI

## 2023-03-21 NOTE — DISCHARGE INSTRUCTIONS
Epidural Steroid Injection   WHAT YOU NEED TO KNOW:   An epidural steroid injection (MARILYN) is a procedure to inject steroid medicine into the epidural space  The epidural space is between your spinal cord and vertebrae  Steroids reduce inflammation and fluid buildup in your spine that may be causing pain  You may be given pain medicine along with the steroids  ACTIVITY  Do not drive or operate machinery today  No strenuous activity today - bending, lifting, etc   You may resume normal activites starting tomorrow - start slowly and as tolerated  You may shower today, but no tub baths or hot tubs  You may have numbness for several hours from the local anesthetic  Please use caution and common sense, especially with weight-bearing activities  CARE OF THE INJECTION SITE  If you have soreness or pain, apply ice to the area today (20 minutes on/20 minutes off)  Starting tomorrow, you may use warm, moist heat or ice if needed  You may have an increase or change in your discomfort for 36-48 hours after your treatment  Apply ice and continue with any pain medication you have been prescribed  Notify the [facility] if you have any of the following: redness, drainage, swelling, headache, stiff neck or fever above 100°F     SPECIAL INSTRUCTIONS  Our office will contact you in approximately 7 days for a progress report  MEDICATIONS  Continue to take all routine medications  Our office may have instructed you to hold some medications  As no general anesthesia was used in today's procedure, you should not experience any side effects related to anesthesia  If you are diabetic, the steroids used in today's injection may temporarily increase your blood sugar levels after the first few days after your injection  Please keep a close eye on your sugars and alert the doctor who manages your diabetes if your sugars are significantly high from your baseline or you are symptomatic       If you have a problem specifically related to your procedure, please call our office at [phone]  Problems not related to your procedure should be directed to your primary care physician

## 2023-04-04 ENCOUNTER — CONSULT (OUTPATIENT)
Dept: NEPHROLOGY | Facility: CLINIC | Age: 73
End: 2023-04-04

## 2023-04-04 VITALS
DIASTOLIC BLOOD PRESSURE: 62 MMHG | HEART RATE: 63 BPM | BODY MASS INDEX: 31.7 KG/M2 | SYSTOLIC BLOOD PRESSURE: 149 MMHG | HEIGHT: 66 IN

## 2023-04-04 DIAGNOSIS — E11.22 TYPE 2 DIABETES MELLITUS WITH STAGE 1 CHRONIC KIDNEY DISEASE, WITHOUT LONG-TERM CURRENT USE OF INSULIN (HCC): ICD-10-CM

## 2023-04-04 DIAGNOSIS — N18.1 STAGE 1 CHRONIC KIDNEY DISEASE: Primary | ICD-10-CM

## 2023-04-04 DIAGNOSIS — Z11.59 NEED FOR HEPATITIS C SCREENING TEST: ICD-10-CM

## 2023-04-04 DIAGNOSIS — R80.9 HYPERTENSION WITH ALBUMINURIA: ICD-10-CM

## 2023-04-04 DIAGNOSIS — N18.1 CHRONIC KIDNEY DISEASE (CKD) STAGE G1/A3, GLOMERULAR FILTRATION RATE (GFR) EQUAL TO OR GREATER THAN 90 ML/MIN/1.73 SQUARE METER AND ALBUMINURIA CREATININE RATIO GREATER THAN 300 MG/G: ICD-10-CM

## 2023-04-04 DIAGNOSIS — I10 HYPERTENSION WITH ALBUMINURIA: ICD-10-CM

## 2023-04-04 DIAGNOSIS — N18.1 TYPE 2 DIABETES MELLITUS WITH STAGE 1 CHRONIC KIDNEY DISEASE, WITHOUT LONG-TERM CURRENT USE OF INSULIN (HCC): ICD-10-CM

## 2023-04-04 LAB
SL AMB  POCT GLUCOSE, UA: NORMAL
SL AMB LEUKOCYTE ESTERASE,UA: NORMAL
SL AMB POCT BILIRUBIN,UA: NORMAL
SL AMB POCT BLOOD,UA: NORMAL
SL AMB POCT CLARITY,UA: NORMAL
SL AMB POCT COLOR,UA: YELLOW
SL AMB POCT KETONES,UA: NORMAL
SL AMB POCT NITRITE,UA: NORMAL
SL AMB POCT PH,UA: 6
SL AMB POCT SPECIFIC GRAVITY,UA: 1.01
SL AMB POCT URINE PROTEIN: 15
SL AMB POCT UROBILINOGEN: 0.2

## 2023-04-04 RX ORDER — CHLORTHALIDONE 25 MG/1
12.5 TABLET ORAL DAILY
Qty: 45 TABLET | Refills: 3 | Status: SHIPPED | OUTPATIENT
Start: 2023-04-04 | End: 2023-07-03

## 2023-04-04 NOTE — PATIENT INSTRUCTIONS
Please check blood work and urine test now  This will include collecting your urine for 24 hours  You will start chlorthalidone half tablet every day  After starting the medication please check blood work in 1 month  Please check blood work and urine test in 3 months before next office visit

## 2023-04-04 NOTE — PROGRESS NOTES
NEPHROLOGY OUTPATIENT CONSULTATION   Marlon Wells Groopt 74 y o  male MRN: [id number]  Date: 04/04/23  Reason for consultation: Albuminuria    ASSESSMENT and PLAN:  64-year-old male was seen in nephrology office today for evaluation of albuminuria      # Chronic Kidney Disease Stage G1A3  - Etiology/risk factors: Longstanding history of diabetes and hypertension  - Baseline Cr: 0 74-0 98, most recently 0 74 02/2023  - Urinalysis: Positive for protein, negative for blood by dipstick analysis in office today  - Proteinuria: 693 mg/g of creatinine 02/2023 with no prior assessment  - Imaging: No hydroureteronephrosis, bilateral low density renal lesions that are too small to characterize but likely benign 2020  - Discussion and management of albuminuria as below  - Discussed risk factor reduction to slow progression of chronic kidney disease  • Intensive blood pressure control as below  • Glycemic control as below  • Lipid control on Crestor 10 mg daily  • Avoidance of NSAIDs due to short & long-term effects on kidney function    # Albuminuria  - Urine albumin to creatinine ratio 693 mg/g 02/2023  - Etiology, most likely in setting of longstanding history of diabetes and hypertension with obesity related hyperfiltration  - Check 24-hour urine protein for accurate assessment of proteinuria  - Check urinalysis with microscopy  - Check C3/C4, KIM/double-stranded DNA  - Check PLA2R antibody, hepatitis panel (hepatitis B/hepatitis C associated with membranous nephropathy)  - Check SPEP/serum free light chain ratio to rule out gammopathy  - Once the work-up is completed, we will decide need for initiation of SGLT2 inhibitor after discussion with endocrinology  - He may need a kidney biopsy if his serological work-up comes back positive or if he has worsening albuminuria in future  - Continue RAAS blockade (currently on maximal dose of losartan at 100 mg daily)  - Addition of chlorthalidone as below will also help with albuminuria due to its antiproteinuric properties  - Ideal candidate for initiation of SGLT2 inhibitor    # Hypertension/Volume   - Goal BP <120/80 per KDIGO guidelines   - Volume status: Euvolemic, he has bilateral lower extremity swelling which may also be present in setting of amlodipine use otherwise appears euvolemic on examination (lung examination is clear)  - Status: Blood pressure currently above goal  - Current antihypertensive regimen: Amlodipine 10 mg daily, losartan 100 mg daily  - Changes: Add chlorthalidone 12 5 mg daily  - Check BMP/magnesium in 1 month    # Screening for Anemia   - Target Hb: More than 10 g/dL  - Most recent hemoglobin: 13 g/dL    # Electrolytes/Acid Base status   - Electrolytes and acid base status within normal limits    # Diabetes mellitus type 2  - Most recent HbA1c 6 3, noted to have A1c of 9 2 in 2020  - He has elevated A1c since 2017  - He is currently on no antidiabetic medications  - Discussed importance of good glycemic control and regarding progression of chronic kidney disease with albuminuria    HISTORY OF PRESENT ILLNESS:  Requesting Physician: Hayley Ahuja MD    Samuel Sanches is a 74 y o  male who who has history of diabetes for more than 10 years  He is currently on no antidiabetic medications  He also has history of hypertension for more than 20 years and is currently taking amlodipine and losartan  His sister had kidney disease of unknown etiology but not on dialysis  He occasionally takes naproxen for back pain  He currently feels well  He denies dyspnea  He denies leg swelling  He denies any trouble with urination  >> Major risk factors for CKD  - Diabetes: Yes for > 10 years   - Hypertension: Yes for 20 years   - Age ?  52 years: Yes   - Family history of kidney disease: Sister had kidney disease (unknown etiology)    - Obesity or metabolic syndrome: Yes     >> Medical history evaluation   - Prior kidney disease or dialysis: No   - Incidental hematuria in the past: No   - Urinary symptoms: None  - History of foamy or frothy urine: Yes  - History of nephrolithiasis: No   - Diseases that share risk factors with CKD: DM, HTN  - Systemic diseases that might affect kidney: No  - History of use of medications that might affect renal function: Ibuprofen occassionally     PAST MEDICAL HISTORY:  Past Medical History:   Diagnosis Date   • Acute biliary pancreatitis 10/18/2020   • Anemia     mild anemia   • Back pain    • Heart burn    • History of chest pain    • Hypertension    • Palpitations    • Problems with hearing    • Problems with swallowing    • Sickle cell trait ([address] )     last assessed 10/31/14   • Snoring    • SOB (shortness of breath)    • Spinal stenosis     ddd spinal stenosis   • Swelling        PAST SURGICAL HISTORY:  Past Surgical History:   Procedure Laterality Date   • BACK SURGERY      lower back   • COLONOSCOPY  01/2016    due 2026, Ginny Hernandez  12/07       ALLERGIES:  Allergies   Allergen Reactions   • Iodinated Contrast Media Other (See Comments)   • Levofloxacin Rash     Other reaction(s): Unknown Allergic Reaction       SOCIAL HISTORY:  Social History     Substance and Sexual Activity   Alcohol Use No     Social History     Substance and Sexual Activity   Drug Use No     Social History     Tobacco Use   Smoking Status Never   Smokeless Tobacco Never       FAMILY HISTORY:  Family History   Problem Relation Age of Onset   • No Known Problems Mother    • Diabetes Sister    • Sickle cell anemia Family        MEDICATIONS:    Current Outpatient Medications:   •  amLODIPine (NORVASC) 10 mg tablet, Take 1 tablet (10 mg total) by mouth daily, Disp: 90 tablet, Rfl: 3  •  aspirin 81 mg chewable tablet, Chew 1 tablet daily, Disp: , Rfl:   •  Contour Next Test test strip, TEST 3 TIMES DAILY, Disp: 300 strip, Rfl: 3  •  Cyanocobalamin 1000 MCG CAPS, Take 1,000 mcg by mouth daily, Disp: , Rfl:   •  losartan (COZAAR) 100 MG tablet, Take 1 tablet (100 mg total) by mouth daily, Disp: 90 tablet, Rfl: 3  •  Microlet Lancets MISC, TEST 3 TIMES DAILY, Disp: 300 each, Rfl: 3  •  naproxen (NAPROSYN) 500 mg tablet, Take 1 tablet (500 mg total) by mouth 2 (two) times a day as needed for mild pain, Disp: 60 tablet, Rfl: 1  •  pregabalin (LYRICA) 50 mg capsule, Take 1 PO HS x 5 days, then 1 PO BID x 5 days, then 1 PO TID, Disp: 90 capsule, Rfl: 1  •  rosuvastatin (CRESTOR) 10 MG tablet, Take 1 tablet (10 mg total) by mouth daily, Disp: 90 tablet, Rfl: 3    REVIEW OF SYSTEMS:  Review of Systems   Constitutional: Negative for chills and fever  HENT: Negative for ear pain and sore throat  Eyes: Negative for pain and visual disturbance  Respiratory: Negative for cough and shortness of breath  Cardiovascular: Negative for chest pain and palpitations  Gastrointestinal: Negative for abdominal pain and vomiting  Genitourinary: Negative for dysuria and hematuria  Musculoskeletal: Negative for arthralgias and back pain  Skin: Negative for color change and rash  Neurological: Negative for seizures and syncope  All other systems reviewed and are negative  All the systems were reviewed and were negative except as documented on the HPI  PHYSICAL EXAMINATION:  There were no vitals taken for this visit  Current Weight:   There is no height or weight on file to calculate BMI  Physical Exam  Constitutional:       Appearance: Normal appearance  HENT:      Head: Normocephalic and atraumatic  Mouth/Throat:      Mouth: Mucous membranes are moist       Pharynx: Oropharynx is clear  Cardiovascular:      Rate and Rhythm: Normal rate and regular rhythm  Pulses: Normal pulses  Heart sounds: Normal heart sounds  Pulmonary:      Effort: Pulmonary effort is normal       Breath sounds: Normal breath sounds  Abdominal:      General: Bowel sounds are normal       Palpations: Abdomen is soft     Musculoskeletal:         General: Normal range of motion  Right lower leg: No edema  Left lower leg: No edema  Skin:     General: Skin is warm and dry  Neurological:      General: No focal deficit present  Mental Status: He is alert and oriented to person, place, and time  Mental status is at baseline  Psychiatric:         Mood and Affect: Mood normal          Behavior: Behavior normal          Thought Content:  Thought content normal          Judgment: Judgment normal          LABORATORY RESULTS:  Lab Results   Component Value Date     10/27/2017    K 4 1 02/21/2023     02/21/2023    CO2 27 02/21/2023    BUN 13 02/21/2023    CREATININE 0 74 02/21/2023    CALCIUM 8 9 02/21/2023    AST 21 02/21/2023    ALT 28 02/21/2023    ALKPHOS 72 02/21/2023    PROT 7 3 10/27/2017    BILITOT 0 8 10/27/2017    EGFR 95 02/21/2023     Lab Results   Component Value Date    WBC 3 7 (L) 02/21/2023    HGB 13 3 02/21/2023    HCT 38 8 02/21/2023    MCV 92 4 02/21/2023     (L) 02/21/2023     Lab Results   Component Value Date    CALCIUM 8 9 02/21/2023

## 2023-04-08 LAB
ALBUMIN/CREAT UR: 233 MCG/MG CREAT
APPEARANCE UR: CLEAR
BACTERIA UR QL AUTO: ABNORMAL /HPF
BILIRUB UR QL STRIP: NEGATIVE
C3 SERPL-MCNC: 100 MG/DL (ref 82–185)
C4 SERPL-MCNC: 19 MG/DL (ref 15–53)
COLOR UR: YELLOW
CREAT UR-MCNC: 81 MG/DL (ref 20–320)
CREAT UR-MCNC: 81 MG/DL (ref 20–320)
DSDNA AB SER-ACNC: 1 IU/ML
GLUCOSE UR QL STRIP: NEGATIVE
HAV IGM SERPL QL IA: NORMAL
HBV CORE IGM SERPL QL IA: NORMAL
HBV SURFACE AG SERPL QL IA: NORMAL
HCV AB S/CO SERPL IA: 0.03
HCV AB SERPL QL IA: NORMAL
HGB UR QL STRIP: NEGATIVE
HYALINE CASTS #/AREA URNS LPF: ABNORMAL /LPF
KAPPA LC FREE SER-MCNC: 26.3 MG/L (ref 3.3–19.4)
KAPPA LC FREE/LAMBDA FREE SER: 1.52 {RATIO} (ref 0.26–1.65)
KETONES UR QL STRIP: NEGATIVE
LAMBDA LC FREE SERPL-MCNC: 17.3 MG/L (ref 5.7–26.3)
LEUKOCYTE ESTERASE UR QL STRIP: NEGATIVE
MICROALBUMIN UR-MCNC: 18.9 MG/DL
NITRITE UR QL STRIP: NEGATIVE
PH UR STRIP: 5.5 [PH] (ref 5–8)
PROT UR QL STRIP: ABNORMAL
PROT UR-MCNC: 30 MG/DL (ref 5–25)
PROT/CREAT UR: 0.37 MG/MG CREAT (ref 0.03–0.15)
PROT/CREAT UR: 370 MG/G CREAT (ref 25–148)
RBC #/AREA URNS HPF: ABNORMAL /HPF
SP GR UR STRIP: 1.01 (ref 1–1.03)
SQUAMOUS #/AREA URNS HPF: ABNORMAL /HPF
WBC #/AREA URNS HPF: ABNORMAL /HPF

## 2023-04-28 LAB
LEFT EYE DIABETIC RETINOPATHY: NORMAL
RIGHT EYE DIABETIC RETINOPATHY: NORMAL

## 2023-05-22 NOTE — PROGRESS NOTES
PT Evaluation     Today's date: 2023  Patient name: Bruce Lynch  : 1950  MRN: [id number]  Referring provider: Iqra Escobedo MD  Dx:   Encounter Diagnosis     ICD-10-CM    1  Spinal stenosis of lumbar region with neurogenic claudication  M48 062 Ambulatory referral to Physical Therapy      2  Lumbar spondylosis  M47 816 Ambulatory referral to Physical Therapy      3  Lumbar radiculopathy  M54 16 Ambulatory referral to Physical Therapy          Start Time: 1020  Stop Time: 1100  Total time in clinic (min): 40 minutes    Assessment  Assessment details: Bruce Lynch is a 74 y o  male who presents to therapy for chronic low back pain with MRI findings showing stenosis  Sx's increases in with prolonged standing, walking and lumbar ext and improve with flexion and sitting  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  Pt will benefit from skilled outpatient PT to address the below stated impairments, to address therapy goals, to reduce pain, and improve function  Therapist explained to pt: findings of IE, rehab diagnosis, and POC  Pt-centered goals reviewed and confirmed by pt  Instruction also given for HEP  Pt expressed verbal agreement and understanding and verified understanding via teach back method  Pt also expressed satisfaction that their current concerns were addressed at the end of the session  Impairments: abnormal gait, abnormal or restricted ROM, activity intolerance, impaired physical strength, lacks appropriate home exercise program and pain with function    Symptom irritability: highBarriers to therapy: None   Understanding of Dx/Px/POC: good   Prognosis: good    Goals  Short term goals: to be met in 3-4 weeks  Pt independent with initial HEP, rationale, technique and frequency, for ROM and pain control  Pt will report at least a 25% reduction in subjective pain complaints/symptoms to better manage ADLs and household chores  Improve max pain level by 2 points on the numeric pain rating scale indicating a clinically significant reduction in pain level  Pt will achieve an improvement in FOTO score indicating an improvement in pain and function  Long term goals: to be met in 6-8 weeks  Pt will report a 50% or > reduction in subjective pain complaints/symptoms to better manage ADLs and household chores  Pt will improve FOTO score further indicating an overall improvement in pain and function   Pt will be able to perform ADLs, iADLS, and work duties with a max pain level of 5/10 for overall reduction in pain  Pt independent with rationale, technique and plan for performance of advanced HEP to ensure independent self-management of symptoms upon discharge  Plan  Patient would benefit from: skilled physical therapy  Planned modality interventions: TENS, thermotherapy: hydrocollator packs, traction, ultrasound, cryotherapy and low level laser therapy  Planned therapy interventions: body mechanics training, therapeutic training, therapeutic exercise, therapeutic activities, stretching, strengthening, neuromuscular re-education, patient education, home exercise program, functional ROM exercises, flexibility, manual therapy, Acevedo taping, joint mobilization and balance  Frequency: 1-2x/week  Duration in weeks: 8  Treatment plan discussed with: patient        Subjective Evaluation    History of Present Illness  Mechanism of injury: Pt presents for chronic low back pain  Had MRI  Was told L1 is affecting his L2-3 area  Was told to try PT but may be a surgical candidate  Also going for injections June 14th  Pt states he has pain in to the anterior hips  Also has pain in the anterior thighs bilat L>R  Feels when he walks, he hunches forward  Does report n/t as well in B legs but intermittently that wraps around from the low back into the groin  States he does do his exercises from when he was in PT before and that helps a little   Pain is worse with standing and walking and then driving at the end of the day is painful after standing all day  Sitting relieves pain  Pain is also limiting and disputing sleep  Pt sleeps on his R side and feels electrical shocks in the leg  Does use a pillow between the knees  MRI done 22: Spondylotic degenerative changes are seen diffusely most prominently at L2-3 where there is severe central and bilateral lateral recess stenosis as well as moderate to severe left foraminal narrowing  Thecal sac is compressed with nerve root stretched and redundant above this level  Correlate for left L2 radiculopathy  Spondylotic degenerative changes are seen at remaining levels including L3-4 where there is moderate to severe central and lateral recess stenosis    Pain  Current pain ratin  At best pain ratin  At worst pain ratin  Location: low back and into B groins and thighs, sometimes to the toes  Quality: burning, radiating and sharp (electrical shock)  Aggravating factors: walking and standing  Progression: worsening    Social Support  Lives with: spouse    Employment status: working (professor)    Diagnostic Tests  MRI studies: abnormal  Treatments  Previous treatment: physical therapy and medication  Patient Goals  Patient goals for therapy: decreased pain, return to sport/leisure activities, return to work, independence with ADLs/IADLs, increased strength and increased motion  Patient goal: relieve pain        Objective    Objective: See treatment diary below    Lumbar AROM  Lumbar flex: hands to floor, reduces pain   Lumbar ext: 50% increases pain  Lumbar R rotation 50%, mild inc pain  Lumbar L rotation 75%, mild inc pain       Lower quarter screen  LE reflexes   - Patellar and achilles absent bilat    - Neg clonus BLE   - LE sensation: more sensitive on the LLE    (+) slump LLE  (+) positive SLR LLE  (+) ERICA L hip with reproduction of LBP  (-) FADIR bilat  (-) ERICA R hip  LE MMT grossly 5/5 throughout, except L hip flexion 4/5     Mobility testing  TTP L4-5 with increased mobility  Hypomobility throughout thoracic spine with PA mobility testing  Increased tone lumbar paraspinals  R>L with TTP     Gait:  Ambulates with flexed hips/slight forward trunk flexion         Precautions: HTN, hx of back surgery    FOTO  5/23 = 36/50      Manuals 5/23            S/l lumbar flexion mob DS 10'            Cupping lumbar parapsinals                                       Neuro Re-Ed                                                                                                        Ther Ex             key pose stretch with PT OP nv            Man hip flexor stretch                                                                                           Ther Activity                                       Gait Training                                       Modalities

## 2023-05-23 ENCOUNTER — EVALUATION (OUTPATIENT)
Dept: PHYSICAL THERAPY | Facility: REHABILITATION | Age: 73
End: 2023-05-23

## 2023-05-23 DIAGNOSIS — M48.062 SPINAL STENOSIS OF LUMBAR REGION WITH NEUROGENIC CLAUDICATION: ICD-10-CM

## 2023-05-23 DIAGNOSIS — M54.16 LUMBAR RADICULOPATHY: ICD-10-CM

## 2023-05-23 DIAGNOSIS — M47.816 LUMBAR SPONDYLOSIS: ICD-10-CM

## 2023-05-24 ENCOUNTER — OFFICE VISIT (OUTPATIENT)
Dept: PHYSICAL THERAPY | Facility: REHABILITATION | Age: 73
End: 2023-05-24

## 2023-05-24 DIAGNOSIS — M47.816 LUMBAR SPONDYLOSIS: ICD-10-CM

## 2023-05-24 DIAGNOSIS — M54.16 LUMBAR RADICULOPATHY: ICD-10-CM

## 2023-05-24 DIAGNOSIS — M48.062 SPINAL STENOSIS OF LUMBAR REGION WITH NEUROGENIC CLAUDICATION: Primary | ICD-10-CM

## 2023-05-24 NOTE — PROGRESS NOTES
Daily Note     Today's date: 2023  Patient name: Daren Riedel  : 1950  MRN: [id number]  Referring provider: Avi Lazcano MD  Dx:   Encounter Diagnosis     ICD-10-CM    1  Spinal stenosis of lumbar region with neurogenic claudication  M48 062       2  Lumbar spondylosis  M47 816       3  Lumbar radiculopathy  M54 16           Start Time: 1620  Stop Time: 1700  Total time in clinic (min): 40 minutes    Subjective: Pt reports he felt alright after the eval      Objective: See treatment diary below      Assessment: Pt presents for first follow up after eval  Pt responded well to manuals  Added rock pod cupping to address myofascial restriction; pt had a favorable response  Pt will benefit from continued skilled outpatient PT to improve strength, to address therapy goals, to reduce pain, and improve function  Plan: Continue per plan of care        Precautions: HTN, hx of back surgery    FOTO   = 36/50      Manuals            S/l lumbar flexion mob DS 10' DS            Cupping lumbar parapsinals  rockpods 5'           L hip distraction with belt  DS           S/l lumbar gapping mobs  DS Gr IV           Neuro Re-Ed                                                                                                        Ther Ex             key pose stretch with PT OP nv            Man hip flexor stretch             VG for ROM  L7 8'                                                                            Ther Activity                                       Gait Training                                       Modalities

## 2023-05-26 ENCOUNTER — TELEPHONE (OUTPATIENT)
Dept: PAIN MEDICINE | Facility: CLINIC | Age: 73
End: 2023-05-26

## 2023-05-26 NOTE — TELEPHONE ENCOUNTER
Caller: pt    Doctor: yosef    Reason for call: pt needs us to tell insurance that he did not have injection with us so he can get it done at [facility]      Call back#: [phone]

## 2023-05-30 ENCOUNTER — OFFICE VISIT (OUTPATIENT)
Dept: PHYSICAL THERAPY | Facility: REHABILITATION | Age: 73
End: 2023-05-30

## 2023-05-30 DIAGNOSIS — M54.16 LUMBAR RADICULOPATHY: ICD-10-CM

## 2023-05-30 DIAGNOSIS — M47.816 LUMBAR SPONDYLOSIS: ICD-10-CM

## 2023-05-30 DIAGNOSIS — M48.062 SPINAL STENOSIS OF LUMBAR REGION WITH NEUROGENIC CLAUDICATION: Primary | ICD-10-CM

## 2023-05-30 NOTE — PROGRESS NOTES
"Daily Note     Today's date: 2023  Patient name: Pearl Root  : 1950  MRN: [id number]  Referring provider: Paris Us MD  Dx:   Encounter Diagnosis     ICD-10-CM    1  Spinal stenosis of lumbar region with neurogenic claudication  M48 062       2  Lumbar spondylosis  M47 816       3  Lumbar radiculopathy  M54 16           Start Time: 935  Stop Time: 1020  Total time in clinic (min): 45 minutes    Subjective: Pt reports he felt good, a little better after last visit  Objective: See treatment diary below      Assessment:Favorable response to tx  Progressed cups from rock pods to glass cups due to positive response to rockpods  Pt responded well  Pt will benefit from continued skilled outpatient PT to improve strength, to address therapy goals, to reduce pain, and improve function  Plan: Continue per plan of care        Precautions: HTN, hx of back surgery    FOTO   = 36/50      Manuals  530          S/l lumbar flexion mob DS 10' DS  DS          Cupping lumbar parapsinals  rockpods 5' 8  x5'Glass cups          L hip distraction with belt  DS DS          S/l lumbar gapping mobs  DS Gr IV DS Gr IV          Neuro Re-Ed                                                                                                        Ther Ex             key pose stretch with PT OP nv  10\"x10          Man hip flexor stretch             VG for ROM  L7 8' L7 5'                                                                           Ther Activity                                       Gait Training                                       Modalities                                            "

## 2023-05-30 NOTE — TELEPHONE ENCOUNTER
Called and spoke with patient  Made him aware I withdrew the auth for procedure  Auth number [id number]  Case number [id number]- Spoke to Carlos Garcia at [facility]

## 2023-06-01 ENCOUNTER — OFFICE VISIT (OUTPATIENT)
Dept: PHYSICAL THERAPY | Facility: REHABILITATION | Age: 73
End: 2023-06-01

## 2023-06-01 DIAGNOSIS — M54.16 LUMBAR RADICULOPATHY: ICD-10-CM

## 2023-06-01 DIAGNOSIS — M48.062 SPINAL STENOSIS OF LUMBAR REGION WITH NEUROGENIC CLAUDICATION: Primary | ICD-10-CM

## 2023-06-01 DIAGNOSIS — M47.816 LUMBAR SPONDYLOSIS: ICD-10-CM

## 2023-06-01 NOTE — PROGRESS NOTES
"Daily Note     Today's date: 2023  Patient name: Claudia Bajwa  : 1950  MRN: [id number]  Referring provider: Lashell Arnold MD  Dx:   Encounter Diagnosis     ICD-10-CM    1  Spinal stenosis of lumbar region with neurogenic claudication  M48 062       2  Lumbar spondylosis  M47 816       3  Lumbar radiculopathy  M54 16           Start Time: 1215  Stop Time: 1305  Total time in clinic (min): 50 minutes    Subjective: Pt reports the cupping seems to be helping but he still has low back and L groin pain  Objective: See treatment diary below      Assessment: Added hip flexor stretching to address ROM impairments and to reduce lumbar lordosis in standing  Pt responded well to tx  Pt will benefit from continued skilled outpatient PT to improve strength, to address therapy goals, to reduce pain, and improve function  Plan: Continue per plan of care        Precautions: HTN, hx of back surgery    FOTO   = 36/50      Manuals  6/1         S/l lumbar flexion mob DS 10' DS  DS DS         Cupping lumbar parapsinals  rockpods 5' 8  x5' Glass cups 8  x5' Glass cups         L hip distraction with belt  DS DS DS         S/l lumbar gapping mobs  DS Gr IV DS Gr IV DS Gr IV         Neuro Re-Ed                                                                                                        Ther Ex             key pose stretch with PT OP nv  10\"x10 10\"x10         Man hip flexor stretch    3' ea bilat         VG for ROM  L7 8' L7 5' L7 8'                                                                          Ther Activity                                       Gait Training                                       Modalities                                            "

## 2023-06-05 ENCOUNTER — OFFICE VISIT (OUTPATIENT)
Dept: PHYSICAL THERAPY | Facility: REHABILITATION | Age: 73
End: 2023-06-05
Payer: COMMERCIAL

## 2023-06-05 DIAGNOSIS — M48.062 SPINAL STENOSIS OF LUMBAR REGION WITH NEUROGENIC CLAUDICATION: Primary | ICD-10-CM

## 2023-06-05 DIAGNOSIS — M54.16 LUMBAR RADICULOPATHY: ICD-10-CM

## 2023-06-05 DIAGNOSIS — M47.816 LUMBAR SPONDYLOSIS: ICD-10-CM

## 2023-06-05 PROCEDURE — 97110 THERAPEUTIC EXERCISES: CPT | Performed by: PHYSICAL THERAPIST

## 2023-06-05 PROCEDURE — 97140 MANUAL THERAPY 1/> REGIONS: CPT | Performed by: PHYSICAL THERAPIST

## 2023-06-05 NOTE — PROGRESS NOTES
"Daily Note     Today's date: 2023  Patient name: Gabi Garcia  : 1950  MRN: [id number]  Referring provider: Binta Mason MD  Dx:   Encounter Diagnosis     ICD-10-CM    1  Spinal stenosis of lumbar region with neurogenic claudication  M48 062       2  Lumbar spondylosis  M47 816       3  Lumbar radiculopathy  M54 16           Start Time: 1623  Stop Time: 1705  Total time in clinic (min): 42 minutes    Subjective: Pt reports he is getting some relief temporarily from PT  Objective: See treatment diary below      Assessment: Favorable response to tx  Pt continues to c/o low back pain that is somewhat relieved by PT  Pt will benefit from continued skilled outpatient PT to improve strength, to address therapy goals, to reduce pain, and improve function  Plan: Continue per plan of care        Precautions: HTN, hx of back surgery    FOTO   = 36/50      Manuals  6        S/l lumbar flexion mob DS 10' DS  DS DS DS        Cupping lumbar parapsinals  rockpods 5' 8  x5' Glass cups 8 Glass cups x5' 8   Glass cups x5'        L hip distraction with belt  DS DS DS DS        S/l lumbar gapping mobs  DS Gr IV DS Gr IV DS Gr IV DS Gr IV        Neuro Re-Ed                                                                                                        Ther Ex             key pose stretch with PT OP nv  10\"x10 10\"x10 10\"x10        Man hip flexor stretch    3' ea bilat 3' ea bilat        VG for ROM  L7 8' L7 5' L7 8' L7 5'                                                                         Ther Activity                                       Gait Training                                       Modalities                                            "

## 2023-06-06 DIAGNOSIS — I10 BENIGN ESSENTIAL HYPERTENSION: ICD-10-CM

## 2023-06-06 DIAGNOSIS — I10 HYPERTENSION WITH ALBUMINURIA: ICD-10-CM

## 2023-06-06 DIAGNOSIS — N18.1 STAGE 1 CHRONIC KIDNEY DISEASE: ICD-10-CM

## 2023-06-06 DIAGNOSIS — E78.2 MIXED HYPERLIPIDEMIA: ICD-10-CM

## 2023-06-06 DIAGNOSIS — N18.1 CHRONIC KIDNEY DISEASE (CKD) STAGE G1/A3, GLOMERULAR FILTRATION RATE (GFR) EQUAL TO OR GREATER THAN 90 ML/MIN/1.73 SQUARE METER AND ALBUMINURIA CREATININE RATIO GREATER THAN 300 MG/G: ICD-10-CM

## 2023-06-06 DIAGNOSIS — R80.9 HYPERTENSION WITH ALBUMINURIA: ICD-10-CM

## 2023-06-06 RX ORDER — ROSUVASTATIN CALCIUM 10 MG/1
10 TABLET, COATED ORAL DAILY
Qty: 90 TABLET | Refills: 3 | Status: SHIPPED | OUTPATIENT
Start: 2023-06-06

## 2023-06-06 RX ORDER — CHLORTHALIDONE 25 MG/1
12.5 TABLET ORAL DAILY
Qty: 45 TABLET | Refills: 3 | Status: SHIPPED | OUTPATIENT
Start: 2023-06-06 | End: 2023-09-04

## 2023-06-06 RX ORDER — AMLODIPINE BESYLATE 10 MG/1
10 TABLET ORAL DAILY
Qty: 90 TABLET | Refills: 3 | Status: SHIPPED | OUTPATIENT
Start: 2023-06-06

## 2023-06-06 RX ORDER — LOSARTAN POTASSIUM 100 MG/1
100 TABLET ORAL DAILY
Qty: 90 TABLET | Refills: 3 | Status: SHIPPED | OUTPATIENT
Start: 2023-06-06

## 2023-06-07 ENCOUNTER — OFFICE VISIT (OUTPATIENT)
Dept: PHYSICAL THERAPY | Facility: REHABILITATION | Age: 73
End: 2023-06-07
Payer: COMMERCIAL

## 2023-06-07 DIAGNOSIS — M54.16 LUMBAR RADICULOPATHY: ICD-10-CM

## 2023-06-07 DIAGNOSIS — M47.816 LUMBAR SPONDYLOSIS: ICD-10-CM

## 2023-06-07 DIAGNOSIS — M48.062 SPINAL STENOSIS OF LUMBAR REGION WITH NEUROGENIC CLAUDICATION: Primary | ICD-10-CM

## 2023-06-07 PROCEDURE — 97110 THERAPEUTIC EXERCISES: CPT | Performed by: PHYSICAL THERAPIST

## 2023-06-07 PROCEDURE — 97140 MANUAL THERAPY 1/> REGIONS: CPT | Performed by: PHYSICAL THERAPIST

## 2023-06-07 NOTE — PROGRESS NOTES
"Daily Note     Today's date: 2023  Patient name: Jd Oscar  : 1950  MRN: [id number]  Referring provider: Russ Tamayo MD  Dx:   Encounter Diagnosis     ICD-10-CM    1  Spinal stenosis of lumbar region with neurogenic claudication  M48 062       2  Lumbar spondylosis  M47 816       3  Lumbar radiculopathy  M54 16           Start Time: 1205  Stop Time: 1250  Total time in clinic (min): 45 minutes    Subjective: Pt reports he is doing pretty well  Still having some low back discomfort  Objective: See treatment diary below    Assessment: Good tolerance to mobilizations and exercises  Unable to lay prone without discomfort in his back, relieved with pillow under abdomen  Educated on pain with exercises at home including pushing through mild pain to improve mobility  Pt will benefit from continued skilled outpatient PT to improve strength, to address therapy goals, to reduce pain, and improve function  Plan: Continue per plan of care        Precautions: HTN, hx of back surgery    FOTO   = 36/50      Manuals        S/l lumbar flexion mob DS 10' DS  DS DS DS QD b/l       Cupping lumbar parapsinals  rockpods 5' 8  x5' Glass cups 8 Glass cups x5' 8   Glass cups x5' 6 Glass cups 5'       L hip distraction with belt  DS DS DS DS QD       S/l lumbar gapping mobs  DS Gr IV DS Gr IV DS Gr IV DS Gr IV QD Gr IV       Neuro Re-Ed                                                                                                        Ther Ex             key pose stretch with PT OP nv  10\"x10 10\"x10 10\"x10        Man hip flexor stretch    3' ea bilat 3' ea bilat        VG for ROM  L7 8' L7 5' L7 8' L7 5'        treadmill      10' pre  3' backwards                                                           Ther Activity                                       Gait Training                                       Modalities                                            "

## 2023-06-08 ENCOUNTER — APPOINTMENT (OUTPATIENT)
Dept: PHYSICAL THERAPY | Facility: REHABILITATION | Age: 73
End: 2023-06-08
Payer: COMMERCIAL

## 2023-06-12 NOTE — PROGRESS NOTES
"Daily Note     Today's date: 2023  Patient name: Merritt Montague  : 1950  MRN: [id number]  Referring provider: Elvi Early MD  Dx:   Encounter Diagnosis     ICD-10-CM    1  Spinal stenosis of lumbar region with neurogenic claudication  M48 062       2  Lumbar spondylosis  M47 816       3  Lumbar radiculopathy  M54 16           Start Time:   Stop Time: 935  Total time in clinic (min): 46 minutes    Subjective: Pt reports no change  Objective: See treatment diary below    Assessment: Favorable response to tx  Pt will benefit from continued skilled outpatient PT to improve strength, to address therapy goals, to reduce pain, and improve function  Plan: Continue per plan of care        Precautions: HTN, hx of back surgery    FOTO   = 36/50      Manuals       S/l lumbar flexion mob DS 10' DS  DS DS DS QD b/l DS       Cupping lumbar parapsinals  rockpods 5' 8  x5' Glass cups 8 Glass cups x5' 8   Glass cups x5' 6 Glass cups 5' 8   Glass cups x5'      L hip distraction with belt  DS DS DS DS QD DS      S/l lumbar gapping mobs  DS Gr IV DS Gr IV DS Gr IV DS Gr IV QD Gr IV DS Gr IV      Neuro Re-Ed                                                                                                        Ther Ex             key pose stretch with PT OP nv  10\"x10 10\"x10 10\"x10  10\"x10      Man hip flexor stretch    3' ea bilat 3' ea bilat        VG for ROM  L7 8' L7 5' L7 8' L7 5'  L7 8'      treadmill      10' pre  3' backwards                                                           Ther Activity                                       Gait Training                                       Modalities                                            "

## 2023-06-13 ENCOUNTER — OFFICE VISIT (OUTPATIENT)
Dept: PHYSICAL THERAPY | Facility: REHABILITATION | Age: 73
End: 2023-06-13
Payer: COMMERCIAL

## 2023-06-13 DIAGNOSIS — M54.16 LUMBAR RADICULOPATHY: ICD-10-CM

## 2023-06-13 DIAGNOSIS — M48.062 SPINAL STENOSIS OF LUMBAR REGION WITH NEUROGENIC CLAUDICATION: Primary | ICD-10-CM

## 2023-06-13 DIAGNOSIS — M47.816 LUMBAR SPONDYLOSIS: ICD-10-CM

## 2023-06-13 PROCEDURE — 97140 MANUAL THERAPY 1/> REGIONS: CPT | Performed by: PHYSICAL THERAPIST

## 2023-06-13 PROCEDURE — 97110 THERAPEUTIC EXERCISES: CPT | Performed by: PHYSICAL THERAPIST

## 2023-06-15 ENCOUNTER — OFFICE VISIT (OUTPATIENT)
Dept: FAMILY MEDICINE CLINIC | Facility: CLINIC | Age: 73
End: 2023-06-15
Payer: COMMERCIAL

## 2023-06-15 VITALS
OXYGEN SATURATION: 99 % | TEMPERATURE: 97.8 F | HEIGHT: 66 IN | WEIGHT: 190 LBS | DIASTOLIC BLOOD PRESSURE: 78 MMHG | RESPIRATION RATE: 16 BRPM | SYSTOLIC BLOOD PRESSURE: 128 MMHG | HEART RATE: 66 BPM | BODY MASS INDEX: 30.53 KG/M2

## 2023-06-15 DIAGNOSIS — E66.9 DIABETES MELLITUS TYPE 2 IN OBESE (HCC): Primary | Chronic | ICD-10-CM

## 2023-06-15 DIAGNOSIS — I10 BENIGN ESSENTIAL HYPERTENSION: ICD-10-CM

## 2023-06-15 DIAGNOSIS — M47.816 LUMBAR SPONDYLOSIS: ICD-10-CM

## 2023-06-15 DIAGNOSIS — E11.69 DIABETES MELLITUS TYPE 2 IN OBESE (HCC): Primary | Chronic | ICD-10-CM

## 2023-06-15 DIAGNOSIS — E11.29 MICROALBUMINURIA DUE TO TYPE 2 DIABETES MELLITUS (HCC): ICD-10-CM

## 2023-06-15 DIAGNOSIS — R80.9 MICROALBUMINURIA DUE TO TYPE 2 DIABETES MELLITUS (HCC): ICD-10-CM

## 2023-06-15 DIAGNOSIS — D57.3 SICKLE CELL TRAIT (HCC): ICD-10-CM

## 2023-06-15 DIAGNOSIS — E78.2 MIXED HYPERLIPIDEMIA: ICD-10-CM

## 2023-06-15 PROCEDURE — 99214 OFFICE O/P EST MOD 30 MIN: CPT | Performed by: FAMILY MEDICINE

## 2023-06-15 RX ORDER — METFORMIN HYDROCHLORIDE 500 MG/1
TABLET, EXTENDED RELEASE ORAL
Qty: 180 TABLET | Refills: 1 | Status: SHIPPED | OUTPATIENT
Start: 2023-06-15

## 2023-06-15 NOTE — PROGRESS NOTES
Name: Carter Chaudhry      : 1950      MRN: [id number]  Encounter Provider: Rachel Sal MD  Encounter Date: 6/15/2023   Encounter department: [address] [place] [address] Dr     1  Diabetes mellitus type 2 in obese [facility])  Assessment & Plan:    Lab Results   Component Value Date    HGBA1C 6 4 (H) 2023     Persistent elevation of fasting blood glucose in 160-1 80 range along with glucose spikes after corticosteroid injections  We discussed Rx options with patient today  He is willing to retry metformin  I strongly advised him to take medication with food only and warned him that metformin may cause symptoms of mild diarrhea and abdominal bloating, patient will start on 500 mg once a day for 2 weeks and then will increase dose to 1000 mg daily  If metformin causes significant GI upset-then patient will stop Rx and switch to Jardiance 10 mg daily  I would prefer to avoid GLP-1 agonists or DPP 4 inhibitors due to history of pancreatitis due to biliary obstruction back in   Proceed with blood work  Orders:  -     metFORMIN (GLUCOPHAGE-XR) 500 mg 24 hr tablet; Take 1 tab daily for 2 weeks, then take 2 tabs once a day WITH FOOD  -     Hemoglobin A1C; Future  -     Hemoglobin A1C    2  Mixed hyperlipidemia  Assessment & Plan:  Rosuvastatin 10 mg daily    Orders:  -     CBC and differential; Future  -     Comprehensive metabolic panel; Future  -     Lipid Panel with Direct LDL reflex; Future  -     TSH, 3rd generation; Future  -     CBC and differential  -     Comprehensive metabolic panel  -     Lipid Panel with Direct LDL reflex  -     TSH, 3rd generation    3  Benign essential hypertension  Assessment & Plan:  Blood pressure is well controlled  Continue current medication regimen      4   Microalbuminuria due to type 2 diabetes mellitus [facility])  Assessment & Plan:    Lab Results   Component Value Date    HGBA1C 6 4 (H) 2023     Continue losartan 100 mg daily    Orders:  -     Empagliflozin (JARDIANCE) 10 MG TABS tablet; Take 1 tablet (10 mg total) by mouth daily    5  Lumbar spondylosis  Assessment & Plan:  Patient reports improvement of low back pain after epidural steroid injection performed at [facility] yesterday  6  Sickle cell trait ([address] )  Assessment & Plan:  Monitor CBC              Subjective     Patient presents for follow-up of type 2 diabetes  He has declined oral Rx during our last office visit since metformin has caused GI upset  Upon further questioning today, patient admits that he took 1 dose of metformin on an empty stomach, developed abdominal bloating and diarrhea and never tried it again  He was subsequently evaluated by endocrinology at [facility] who had similar recommendations  Patient with remote history of acute biliary pancreatitis in 2020, recovered well, denies any GI symptoms at present time  He states that usually his fasting blood sugars are running in the range of 1 60-1 80, evening sugars are ranging from 1 20-1 30  Patient was seen by spine and pain management at [facility] yesterday and received corticosteroid injection in his lower back  His blood glucose prior to injection was in lower 200s, patient reports blood sugar of 347 shortly after injection and then up to 517 last night  Fasting blood glucose this morning was 224    Patient said that his low back pain symptoms have improved significantly  Patient is here to discuss medications for treatment of type 2 diabetes as he would like to avoid blood sugar spikes  He is willing to give metformin another try and take it appropriately with food  He has been feeling generally well  Last set of blood work February  Patient said that he is leaving for 2 months vacation in a few days and [place] of the [place]  Review of Systems   Constitutional: Negative  HENT: Negative  Eyes: Negative  Respiratory: Negative  Cardiovascular: Negative  Gastrointestinal: Negative  Endocrine: Negative  Genitourinary: Negative  Musculoskeletal: Positive for back pain  Allergic/Immunologic: Negative  Neurological: Negative  Hematological: Negative  Psychiatric/Behavioral: Negative          Past Medical History:   Diagnosis Date   • Acute biliary pancreatitis 10/18/2020   • Anemia     mild anemia   • Back pain    • Heart burn    • History of chest pain    • Hypertension    • Palpitations    • Problems with hearing    • Problems with swallowing    • Sickle cell trait ([address] )     last assessed 10/31/14   • Snoring    • SOB (shortness of breath)    • Spinal stenosis     ddd spinal stenosis   • Swelling      Past Surgical History:   Procedure Laterality Date   • BACK SURGERY      lower back   • COLONOSCOPY  01/2016    due 2026, Louisa Hernandez  12/07   • LUMBAR EPIDURAL INJECTION  06/14/2023     Family History   Problem Relation Age of Onset   • No Known Problems Mother    • Diabetes Sister    • Sickle cell anemia Family      Social History     Socioeconomic History   • Marital status: /Civil Union     Spouse name: None   • Number of children: None   • Years of education: None   • Highest education level: None   Occupational History   • None   Tobacco Use   • Smoking status: Never   • Smokeless tobacco: Never   Vaping Use   • Vaping Use: Never used   Substance and Sexual Activity   • Alcohol use: No   • Drug use: No   • Sexual activity: None   Other Topics Concern   • None   Social History Narrative   • None     Social Determinants of Health     Financial Resource Strain: Not on file   Food Insecurity: Not on file   Transportation Needs: Not on file   Physical Activity: Not on file   Stress: Not on file   Social Connections: Not on file   Intimate Partner Violence: Not on file   Housing Stability: Not on file     Current Outpatient Medications on File Prior to Visit   Medication Sig   • amLODIPine (NORVASC) 10 mg tablet "Take 1 tablet (10 mg total) by mouth daily   • aspirin 81 mg chewable tablet Chew 1 tablet daily   • chlorthalidone 25 mg tablet Take 0 5 tablets (12 5 mg total) by mouth daily   • Contour Next Test test strip TEST 3 TIMES DAILY   • losartan (COZAAR) 100 MG tablet Take 1 tablet (100 mg total) by mouth daily   • Microlet Lancets MISC TEST 3 TIMES DAILY   • naproxen (NAPROSYN) 500 mg tablet Take 1 tablet (500 mg total) by mouth 2 (two) times a day as needed for mild pain   • rosuvastatin (CRESTOR) 10 MG tablet Take 1 tablet (10 mg total) by mouth daily   • chlorthalidone 25 mg tablet Take 0 5 tablets (12 5 mg total) by mouth daily     Allergies   Allergen Reactions   • Iodinated Contrast Media Other (See Comments)   • Levofloxacin Rash     Other reaction(s): Unknown Allergic Reaction     Immunization History   Administered Date(s) Administered   • COVID-19 PFIZER VACCINE 0 3 ML IM 02/21/2021, 03/13/2021, 11/19/2021   • INFLUENZA 10/12/2007, 09/25/2009, 01/17/2014, 10/22/2014, 09/20/2018, 11/30/2022   • Influenza Split High Dose Preservative Free IM 10/14/2016, 10/27/2017   • Influenza, high dose seasonal 0 7 mL 09/20/2018, 09/10/2019, 09/16/2020   • Influenza, seasonal, injectable 10/12/2007, 09/25/2009, 01/17/2014, 10/22/2014   • Pneumococcal Conjugate 13-Valent 10/14/2016   • Pneumococcal Polysaccharide PPV23 04/26/2013       Objective     /78 (BP Location: Left arm, Patient Position: Sitting, Cuff Size: Large)   Pulse 66   Temp 97 8 °F (36 6 °C) (Temporal)   Resp 16   Ht 5' 6\" (1 676 m)   Wt 86 2 kg (190 lb)   SpO2 99%   BMI 30 67 kg/m²     Physical Exam  Vitals and nursing note reviewed  Constitutional:       Appearance: Normal appearance  Neurological:      General: No focal deficit present  Mental Status: He is alert and oriented to person, place, and time     Psychiatric:         Mood and Affect: Mood normal          Behavior: Behavior normal        Reyne Gosselin, MD  "

## 2023-06-16 ENCOUNTER — APPOINTMENT (OUTPATIENT)
Dept: PHYSICAL THERAPY | Facility: REHABILITATION | Age: 73
End: 2023-06-16
Payer: COMMERCIAL

## 2023-06-17 PROBLEM — E78.5 HYPERLIPIDEMIA: Chronic | Status: ACTIVE | Noted: 2019-09-08

## 2023-06-17 LAB
ALBUMIN SERPL-MCNC: 4.7 G/DL (ref 3.6–5.1)
ALBUMIN/GLOB SERPL: 1.8 (CALC) (ref 1–2.5)
ALP SERPL-CCNC: 66 U/L (ref 35–144)
ALT SERPL-CCNC: 27 U/L (ref 9–46)
AST SERPL-CCNC: 20 U/L (ref 10–35)
BASOPHILS # BLD AUTO: 23 CELLS/UL (ref 0–200)
BASOPHILS NFR BLD AUTO: 0.3 %
BILIRUB SERPL-MCNC: 0.8 MG/DL (ref 0.2–1.2)
BUN SERPL-MCNC: 27 MG/DL (ref 7–25)
BUN/CREAT SERPL: 29 (CALC) (ref 6–22)
CALCIUM SERPL-MCNC: 9.6 MG/DL (ref 8.6–10.3)
CHLORIDE SERPL-SCNC: 103 MMOL/L (ref 98–110)
CHOLEST SERPL-MCNC: 149 MG/DL
CHOLEST/HDLC SERPL: 2.6 (CALC)
CO2 SERPL-SCNC: 28 MMOL/L (ref 20–32)
CREAT SERPL-MCNC: 0.94 MG/DL (ref 0.7–1.28)
EOSINOPHIL # BLD AUTO: 23 CELLS/UL (ref 15–500)
EOSINOPHIL NFR BLD AUTO: 0.3 %
ERYTHROCYTE [DISTWIDTH] IN BLOOD BY AUTOMATED COUNT: 13.1 % (ref 11–15)
GFR/BSA.PRED SERPLBLD CYS-BASED-ARV: 85 ML/MIN/1.73M2
GLOBULIN SER CALC-MCNC: 2.6 G/DL (CALC) (ref 1.9–3.7)
GLUCOSE SERPL-MCNC: 118 MG/DL (ref 65–99)
HBA1C MFR BLD: 6.4 % OF TOTAL HGB
HCT VFR BLD AUTO: 38.1 % (ref 38.5–50)
HDLC SERPL-MCNC: 57 MG/DL
HGB BLD-MCNC: 13.2 G/DL (ref 13.2–17.1)
LDLC SERPL CALC-MCNC: 71 MG/DL (CALC)
LYMPHOCYTES # BLD AUTO: 2102 CELLS/UL (ref 850–3900)
LYMPHOCYTES NFR BLD AUTO: 27.3 %
MCH RBC QN AUTO: 32.3 PG (ref 27–33)
MCHC RBC AUTO-ENTMCNC: 34.6 G/DL (ref 32–36)
MCV RBC AUTO: 93.2 FL (ref 80–100)
MONOCYTES # BLD AUTO: 593 CELLS/UL (ref 200–950)
MONOCYTES NFR BLD AUTO: 7.7 %
NEUTROPHILS # BLD AUTO: 4959 CELLS/UL (ref 1500–7800)
NEUTROPHILS NFR BLD AUTO: 64.4 %
NONHDLC SERPL-MCNC: 92 MG/DL (CALC)
PLATELET # BLD AUTO: 164 THOUSAND/UL (ref 140–400)
PMV BLD REES-ECKER: 11.1 FL (ref 7.5–12.5)
POTASSIUM SERPL-SCNC: 3.4 MMOL/L (ref 3.5–5.3)
PROT SERPL-MCNC: 7.3 G/DL (ref 6.1–8.1)
RBC # BLD AUTO: 4.09 MILLION/UL (ref 4.2–5.8)
SODIUM SERPL-SCNC: 141 MMOL/L (ref 135–146)
TRIGL SERPL-MCNC: 128 MG/DL
TSH SERPL-ACNC: 4.48 MIU/L (ref 0.4–4.5)
WBC # BLD AUTO: 7.7 THOUSAND/UL (ref 3.8–10.8)

## 2023-06-17 NOTE — ASSESSMENT & PLAN NOTE
Patient reports improvement of low back pain after epidural steroid injection performed at [facility] yesterday

## 2023-06-17 NOTE — ASSESSMENT & PLAN NOTE
Lab Results   Component Value Date    HGBA1C 6 4 (H) 06/16/2023     Persistent elevation of fasting blood glucose in 160-1 80 range along with glucose spikes after corticosteroid injections  We discussed Rx options with patient today  He is willing to retry metformin  I strongly advised him to take medication with food only and warned him that metformin may cause symptoms of mild diarrhea and abdominal bloating, patient will start on 500 mg once a day for 2 weeks and then will increase dose to 1000 mg daily  If metformin causes significant GI upset-then patient will stop Rx and switch to Jardiance 10 mg daily  I would prefer to avoid GLP-1 agonists or DPP 4 inhibitors due to history of pancreatitis due to biliary obstruction back in 2020  Proceed with blood work

## 2023-06-20 ENCOUNTER — APPOINTMENT (OUTPATIENT)
Dept: PHYSICAL THERAPY | Facility: REHABILITATION | Age: 73
End: 2023-06-20
Payer: COMMERCIAL

## 2023-06-30 DIAGNOSIS — E66.9 DIABETES MELLITUS TYPE 2 IN OBESE (HCC): ICD-10-CM

## 2023-06-30 DIAGNOSIS — E11.69 DIABETES MELLITUS TYPE 2 IN OBESE (HCC): ICD-10-CM

## 2023-06-30 RX ORDER — LANCETS
EACH MISCELLANEOUS
Qty: 300 EACH | Refills: 3 | Status: SHIPPED | OUTPATIENT
Start: 2023-06-30

## 2023-07-11 NOTE — ADDENDUM NOTE
Addended by: Lisandra Rangel on: 2/3/2020 05:38 PM     Modules accepted: Orders Noted. Called and spoke with Darby pharmacist at [facility]. The insurance will allow for both. Explained that patient takes the rizatriptan when nauseated and the sumatriptan when not nauseated.   She does not take both at the same time.   They will fill.  Britney Bhat RN on 7/11/2023 at 3:58 PM

## 2023-07-18 DIAGNOSIS — E66.9 DIABETES MELLITUS TYPE 2 IN OBESE (HCC): ICD-10-CM

## 2023-07-18 DIAGNOSIS — E11.69 DIABETES MELLITUS TYPE 2 IN OBESE (HCC): ICD-10-CM

## 2023-07-19 RX ORDER — PERPHENAZINE 16 MG/1
TABLET, FILM COATED ORAL
Qty: 300 STRIP | Refills: 3 | Status: SHIPPED | OUTPATIENT
Start: 2023-07-19

## 2023-07-20 NOTE — ASSESSMENT & PLAN NOTE
Bilateral hearing loss reported by patient  No tinnitus on steadiness    Referral to ENT Detail Level: None Urine Pregnancy Test Result: negative Performed By: Vincent Govea MA

## 2023-08-03 NOTE — ASSESSMENT & PLAN NOTE
· Pt presents for consultation for low back pain  · Hx of left L3/4 MIS discectomy in 2012 at [facility]    · Imaging reviewed personally and by attending  Final results below discussed with pt in detail  · MRI lumbar spine wo 8/8/22: Spondylotic degenerative changes are seen diffusely most prominently at L2-3 where there is severe central and bilateral lateral recess stenosis as well as moderate to severe left foraminal narrowing  Thecal sac is compressed with nerve root stretched and redundant above this level  Spondylotic degenerative changes are seen at remaining levels including L3-4 where there is moderate to severe central and lateral recess stenosis  Plan  · Recommend pt continue PT as tolerated  Pt had PT 2021 to May 2022  Pt reports mild relief of sx with PT but reports sx returned when he stopped PT  Referral to PT provided  · Pt has not been to seen pain mgt or had any recent MARILYN, referral to pain mg t   · Dr Abram Kelly discussed imaging findings with pt that shows multilevel degenerative changes    She also noted that some of pt's sx such as pain in bilateral groin/ L1 distribution does not correlate with imaging findings  She recommended that at this time pt continue to exhaust conservative measures including PT/pain mgt  · Should sx persist or worsen, pt develop new or red flag sx to contact neurosurgery as needed for further follow up  · Pt showed understanding and was in agreement with the plan  Hide Include Location In Plan Question?: No Detail Level: Generalized

## 2023-08-14 ENCOUNTER — OFFICE VISIT (OUTPATIENT)
Dept: FAMILY MEDICINE CLINIC | Facility: CLINIC | Age: 73
End: 2023-08-14
Payer: COMMERCIAL

## 2023-08-14 VITALS
HEIGHT: 66 IN | SYSTOLIC BLOOD PRESSURE: 134 MMHG | BODY MASS INDEX: 29.89 KG/M2 | HEART RATE: 86 BPM | RESPIRATION RATE: 18 BRPM | WEIGHT: 186 LBS | DIASTOLIC BLOOD PRESSURE: 80 MMHG | TEMPERATURE: 98.4 F | OXYGEN SATURATION: 96 %

## 2023-08-14 DIAGNOSIS — R05.9 COUGH, UNSPECIFIED TYPE: Primary | ICD-10-CM

## 2023-08-14 PROBLEM — R05.3 PERSISTENT COUGH: Status: ACTIVE | Noted: 2023-08-14

## 2023-08-14 LAB
SARS-COV-2 AG UPPER RESP QL IA: NEGATIVE
VALID CONTROL: NORMAL

## 2023-08-14 PROCEDURE — 87811 SARS-COV-2 COVID19 W/OPTIC: CPT | Performed by: FAMILY MEDICINE

## 2023-08-14 PROCEDURE — 99213 OFFICE O/P EST LOW 20 MIN: CPT | Performed by: FAMILY MEDICINE

## 2023-08-14 RX ORDER — BENZONATATE 100 MG/1
100 CAPSULE ORAL 3 TIMES DAILY PRN
Qty: 20 CAPSULE | Refills: 0 | Status: SHIPPED | OUTPATIENT
Start: 2023-08-14 | End: 2023-08-25

## 2023-08-14 NOTE — ASSESSMENT & PLAN NOTE
Likely due to postnasal drainage vs persistent viral cough. COVID negative today. Completed two courses of antibiotics and short course of oral steroids and inhaler about a month ago. Recommend Allegra and Flonase. Trial of Tessalon as well. Follow up if not improved in 2 weeks.

## 2023-08-14 NOTE — PROGRESS NOTES
Name: Lucretia Roldan      : 1950      MRN: [id number]  Encounter Provider: Abbie Hernandez DO  Encounter Date: 2023   Encounter department: [address] [place] [address]     1. Cough, unspecified type  Assessment & Plan:  Likely due to postnasal drainage vs persistent viral cough. COVID negative today. Completed two courses of antibiotics and short course of oral steroids and inhaler about a month ago. Recommend Allegra and Flonase. Trial of Tessalon as well. Follow up if not improved in 2 weeks. Orders:  -     POCT Rapid Covid Ag  -     benzonatate (TESSALON PERLES) 100 mg capsule; Take 1 capsule (100 mg total) by mouth 3 (three) times a day as needed for cough           Subjective      HPI   Cough for one month, improving over time, still has some residual. Not taking anything for it right now. While on vacation in [facility] got sick due to dust. Saw a doctor there who prescribed an antibiotic which he completed. He saw another doctor there who prescribed another antibiotic which made him feel better. Doctors there were concerned for bronchitis and he completed a course of steroids as well. Feeling well otherwise. Review of Systems   Constitutional: Negative. HENT: Negative. Respiratory: Positive for cough. Cardiovascular: Negative. Gastrointestinal: Negative. Musculoskeletal: Negative.         Current Outpatient Medications on File Prior to Visit   Medication Sig   • amLODIPine (NORVASC) 10 mg tablet Take 1 tablet (10 mg total) by mouth daily   • aspirin 81 mg chewable tablet Chew 1 tablet daily   • chlorthalidone 25 mg tablet Take 0.5 tablets (12.5 mg total) by mouth daily   • Contour Next Test test strip TEST 3 TIMES DAILY   • Empagliflozin (JARDIANCE) 10 MG TABS tablet Take 1 tablet (10 mg total) by mouth daily   • losartan (COZAAR) 100 MG tablet Take 1 tablet (100 mg total) by mouth daily   • metFORMIN (GLUCOPHAGE-XR) 500 mg 24 hr tablet Take 1 tab daily for 2 weeks, then take 2 tabs once a day WITH FOOD   • Microlet Lancets MISC TEST 3 TIMES DAILY   • naproxen (NAPROSYN) 500 mg tablet Take 1 tablet (500 mg total) by mouth 2 (two) times a day as needed for mild pain   • rosuvastatin (CRESTOR) 10 MG tablet Take 1 tablet (10 mg total) by mouth daily       Objective     /80 (BP Location: Left arm, Patient Position: Sitting, Cuff Size: Standard)   Pulse 86   Temp 98.4 °F (36.9 °C) (Temporal)   Resp 18   Ht 5' 6" (1.676 m)   Wt 84.4 kg (186 lb)   SpO2 96%   BMI 30.02 kg/m²     Physical Exam  Vitals reviewed. Constitutional:       Appearance: Normal appearance. HENT:      Head: Normocephalic and atraumatic. Right Ear: External ear normal.      Left Ear: External ear normal.      Nose: Nose normal.      Mouth/Throat:      Mouth: Mucous membranes are moist.      Pharynx: Oropharyngeal exudate present. No posterior oropharyngeal erythema. Eyes:      Extraocular Movements: Extraocular movements intact. Conjunctiva/sclera: Conjunctivae normal.   Cardiovascular:      Rate and Rhythm: Normal rate and regular rhythm. Heart sounds: Normal heart sounds. Pulmonary:      Effort: Pulmonary effort is normal.      Breath sounds: Normal breath sounds. No wheezing, rhonchi or rales. Abdominal:      General: Abdomen is flat. Skin:     General: Skin is warm and dry. Capillary Refill: Capillary refill takes less than 2 seconds. Neurological:      Mental Status: He is alert.    Psychiatric:         Mood and Affect: Mood normal.         Behavior: Behavior normal.       Carrizales Ching,

## 2023-08-15 ENCOUNTER — TELEPHONE (OUTPATIENT)
Dept: FAMILY MEDICINE CLINIC | Facility: CLINIC | Age: 73
End: 2023-08-15

## 2023-08-15 NOTE — TELEPHONE ENCOUNTER
Pt calling, he called his insurance to make sure shingles vaccine is covered, it is 100% for in office. Tentatively scheduled Thurs 8/17, is this ok?

## 2023-08-16 PROBLEM — R05.9 COUGH: Status: ACTIVE | Noted: 2023-08-14

## 2023-08-17 ENCOUNTER — CLINICAL SUPPORT (OUTPATIENT)
Dept: FAMILY MEDICINE CLINIC | Facility: CLINIC | Age: 73
End: 2023-08-17
Payer: COMMERCIAL

## 2023-08-17 DIAGNOSIS — Z23 NEED FOR SHINGLES VACCINE: Primary | ICD-10-CM

## 2023-08-17 PROCEDURE — 90750 HZV VACC RECOMBINANT IM: CPT

## 2023-08-17 PROCEDURE — 90471 IMMUNIZATION ADMIN: CPT

## 2023-08-23 ENCOUNTER — TELEPHONE (OUTPATIENT)
Dept: NEPHROLOGY | Facility: CLINIC | Age: 73
End: 2023-08-23

## 2023-08-23 NOTE — TELEPHONE ENCOUNTER
Left voicemail for the patient reminding them to complete labwork prior to 8/29 appointment with Dr. Ynes Markham. Advised patient to call back with any questions, concerns, or if they need the orders sent to an outside lab.

## 2023-08-25 ENCOUNTER — OFFICE VISIT (OUTPATIENT)
Dept: FAMILY MEDICINE CLINIC | Facility: CLINIC | Age: 73
End: 2023-08-25
Payer: COMMERCIAL

## 2023-08-25 VITALS
RESPIRATION RATE: 16 BRPM | OXYGEN SATURATION: 96 % | HEART RATE: 61 BPM | DIASTOLIC BLOOD PRESSURE: 70 MMHG | TEMPERATURE: 97.6 F | WEIGHT: 187 LBS | BODY MASS INDEX: 30.05 KG/M2 | HEIGHT: 66 IN | SYSTOLIC BLOOD PRESSURE: 136 MMHG

## 2023-08-25 DIAGNOSIS — I10 BENIGN ESSENTIAL HYPERTENSION: ICD-10-CM

## 2023-08-25 DIAGNOSIS — E78.2 MIXED HYPERLIPIDEMIA: Chronic | ICD-10-CM

## 2023-08-25 DIAGNOSIS — E66.9 DIABETES MELLITUS TYPE 2 IN OBESE (HCC): Chronic | ICD-10-CM

## 2023-08-25 DIAGNOSIS — M54.16 LUMBAR RADICULOPATHY: ICD-10-CM

## 2023-08-25 DIAGNOSIS — E11.29 MICROALBUMINURIA DUE TO TYPE 2 DIABETES MELLITUS (HCC): ICD-10-CM

## 2023-08-25 DIAGNOSIS — R80.9 MICROALBUMINURIA DUE TO TYPE 2 DIABETES MELLITUS (HCC): ICD-10-CM

## 2023-08-25 DIAGNOSIS — M47.816 LUMBAR SPONDYLOSIS: ICD-10-CM

## 2023-08-25 DIAGNOSIS — E11.69 DIABETES MELLITUS TYPE 2 IN OBESE (HCC): Chronic | ICD-10-CM

## 2023-08-25 DIAGNOSIS — R05.2 SUBACUTE COUGH: ICD-10-CM

## 2023-08-25 DIAGNOSIS — Z00.00 MEDICARE ANNUAL WELLNESS VISIT, SUBSEQUENT: Primary | ICD-10-CM

## 2023-08-25 PROCEDURE — 99214 OFFICE O/P EST MOD 30 MIN: CPT | Performed by: FAMILY MEDICINE

## 2023-08-25 PROCEDURE — 99397 PER PM REEVAL EST PAT 65+ YR: CPT | Performed by: FAMILY MEDICINE

## 2023-08-25 RX ORDER — AMOXICILLIN AND CLAVULANATE POTASSIUM 875; 125 MG/1; MG/1
1 TABLET, FILM COATED ORAL
Qty: 14 TABLET | Refills: 0 | Status: SHIPPED | OUTPATIENT
Start: 2023-08-25 | End: 2023-09-01

## 2023-08-25 NOTE — PROGRESS NOTES
Assessment and Plan:     Problem List Items Addressed This Visit        Endocrine    Diabetes mellitus type 2 in obese ([address]) (Chronic)       Lab Results   Component Value Date    HGBA1C 6.4 (H) 06/16/2023    Continue metformin ER 1,000 mg daily  Foot exam performed today         Relevant Orders    Comprehensive metabolic panel    TSH, 3rd generation    Hemoglobin A1C    Microalbuminuria due to type 2 diabetes mellitus ([address])       Lab Results   Component Value Date    HGBA1C 6.4 (H) 06/16/2023    Continue Losartan            Cardiovascular and Mediastinum    Benign essential hypertension (Chronic)     Blood pressure is well controlled. Continue amlodipine and losartan         Relevant Orders    CBC and differential       Nervous and Auditory    Lumbar radiculopathy    Relevant Orders    Ambulatory referral to Physical Therapy       Musculoskeletal and Integument    Lumbar spondylosis    Relevant Orders    Ambulatory referral to Physical Therapy       Other    Hyperlipidemia (Chronic)     Continue Crestor         Relevant Orders    Lipid Panel with Direct LDL reflex    Cough     Patient was treated with acute bronchitis with bronchospasm and [place] 1 to 2 months ago. He reportedly had 3 days of Zithromax and inhalers. Symptoms overall improved, he reports lingering cough. Start Augmentin and Trelegy. Patient will contact me in 7 to 10 days if symptoms are not improving         Relevant Medications    amoxicillin-clavulanate (AUGMENTIN) 875-125 mg per tablet    fluticasone-umeclidinium-vilanterol 100-62.5-25 mcg/actuation inhaler   Other Visit Diagnoses     Medicare annual wellness visit, subsequent    -  Primary      Patient started shingles vaccination through our office. I also recommend annual flu vaccine. He is up-to-date with pneumococcal vaccination.         Preventive health issues were discussed with patient, and age appropriate screening tests were ordered as noted in patient's After Visit Summary. Personalized health advice and appropriate referrals for health education or preventive services given if needed, as noted in patient's After Visit Summary. History of Present Illness:     Patient presents for a Medicare Wellness Visit    Follow-up/annual wellness s/p 1 injection for low back pain  Lasted " a while"  Home PT- feels better,would like to start formal PT    Pending OV with DR. David Prieto 8/29/23, nephrology  [id number] - neurosurgery, no indication for back surgery  - nephrology at [facility], no changes in treatment, patient was evaluated for microalbuminuria, nephrology recommended to continue losartan therapy    Type 2 diabetes: He remains on metformin 1000 mg daily and feels okay. No significant GI upset    Cough since recent travel  Travel to [facility]  Took possible Zithroamx 3 day  Along 2 Inhalers in July     Residual dry cough, worsewith articulation               Patient Care Team:  Chris Jaeger MD as PCP - RENETTA Navarrete MD Konnie Breed, MD (Ophthalmology)  Sid Weiss OD (Optometry)  Gabbi Hatch MD (Nephrology)     Review of Systems:     Review of Systems   Constitutional: Negative. HENT: Negative. Respiratory: Positive for cough. Negative for chest tightness, shortness of breath and wheezing. Cardiovascular: Negative. Gastrointestinal: Negative. Endocrine: Negative. Musculoskeletal: Negative. Allergic/Immunologic: Negative. Neurological: Negative. Hematological: Negative. Psychiatric/Behavioral: Negative.          Problem List:     Patient Active Problem List   Diagnosis   • Anemia   • Arthralgia of knee, left   • Benign enlargement of prostate   • Benign essential hypertension   • Complete left bundle branch block (LBBB)   • Lumbar radiculopathy   • Sickle cell trait (HCC)   • Spinal stenosis   • Mild obstructive sleep apnea   • Hyperlipidemia   • Diabetes mellitus type 2 in obese [facility])   • Medial epicondylitis of left elbow   • Post traumatic adhesive capsulitis of both shoulders   • Bilateral hearing loss   • Internal derangement of shoulder, right   • Tear of right supraspinatus tendon   • Lumbar spondylosis   • Microalbuminuria due to type 2 diabetes mellitus (HCC)   • Cough      Past Medical and Surgical History:     Past Medical History:   Diagnosis Date   • Acute biliary pancreatitis 10/18/2020   • Anemia     mild anemia   • Back pain    • Heart burn    • History of chest pain    • Hypertension    • Palpitations    • Problems with hearing    • Problems with swallowing    • Sickle cell trait ([address])     last assessed 10/31/14   • Snoring    • SOB (shortness of breath)    • Spinal stenosis     ddd spinal stenosis   • Swelling      Past Surgical History:   Procedure Laterality Date   • BACK SURGERY      lower back   • COLONOSCOPY  01/2016    due 2026, Jose Hernandez  12/07   • LUMBAR EPIDURAL INJECTION  06/14/2023      Family History:     Family History   Problem Relation Age of Onset   • No Known Problems Mother    • Diabetes Sister    • Sickle cell anemia Family       Social History:     Social History     Socioeconomic History   • Marital status: /Civil Union     Spouse name: None   • Number of children: None   • Years of education: None   • Highest education level: None   Occupational History   • None   Tobacco Use   • Smoking status: Never   • Smokeless tobacco: Never   Vaping Use   • Vaping Use: Never used   Substance and Sexual Activity   • Alcohol use: No   • Drug use: No   • Sexual activity: None   Other Topics Concern   • None   Social History Narrative   • None     Social Determinants of Health     Financial Resource Strain: Medium Risk (8/24/2023)    Overall Financial Resource Strain (CARDIA)    • Difficulty of Paying Living Expenses: Somewhat hard   Food Insecurity: Not on file   Transportation Needs: No Transportation Needs (8/24/2023)    PRAPARE - Transportation    • Lack of Transportation (Medical): No    • Lack of Transportation (Non-Medical): No   Physical Activity: Not on file   Stress: Not on file   Social Connections: Not on file   Intimate Partner Violence: Not on file   Housing Stability: Not on file      Medications and Allergies:     Current Outpatient Medications   Medication Sig Dispense Refill   • amLODIPine (NORVASC) 10 mg tablet Take 1 tablet (10 mg total) by mouth daily 90 tablet 3   • amoxicillin-clavulanate (AUGMENTIN) 875-125 mg per tablet Take 1 tablet by mouth 2 (two) times daily after meals for 7 days 14 tablet 0   • aspirin 81 mg chewable tablet Chew 1 tablet daily     • chlorthalidone 25 mg tablet Take 0.5 tablets (12.5 mg total) by mouth daily 45 tablet 3   • Contour Next Test test strip TEST 3 TIMES DAILY 300 strip 3   • fluticasone-umeclidinium-vilanterol 100-62.5-25 mcg/actuation inhaler Inhale 1 puff daily Rinse mouth after use. 1 each 0   • losartan (COZAAR) 100 MG tablet Take 1 tablet (100 mg total) by mouth daily 90 tablet 3   • metFORMIN (GLUCOPHAGE-XR) 500 mg 24 hr tablet Take 1 tab daily for 2 weeks, then take 2 tabs once a day WITH FOOD 180 tablet 1   • Microlet Lancets MISC TEST 3 TIMES DAILY 300 each 3   • naproxen (NAPROSYN) 500 mg tablet Take 1 tablet (500 mg total) by mouth 2 (two) times a day as needed for mild pain 60 tablet 1   • rosuvastatin (CRESTOR) 10 MG tablet Take 1 tablet (10 mg total) by mouth daily 90 tablet 3     No current facility-administered medications for this visit.      Allergies   Allergen Reactions   • Iodinated Contrast Media Other (See Comments)   • Levofloxacin Rash     Other reaction(s): Unknown Allergic Reaction      Immunizations:     Immunization History   Administered Date(s) Administered   • COVID-19 PFIZER VACCINE 0.3 ML IM 02/21/2021, 03/13/2021, 11/19/2021   • INFLUENZA 10/12/2007, 09/25/2009, 01/17/2014, 10/22/2014, 09/20/2018, 11/30/2022   • Influenza Split High Dose Preservative Free IM 10/14/2016, 10/27/2017 • Influenza, high dose seasonal 0.7 mL 09/20/2018, 09/10/2019, 09/16/2020   • Influenza, seasonal, injectable 10/12/2007, 09/25/2009, 01/17/2014, 10/22/2014   • Pneumococcal Conjugate 13-Valent 10/14/2016   • Pneumococcal Polysaccharide PPV23 04/26/2013   • Zoster Vaccine Recombinant 08/17/2023      Health Maintenance:         Topic Date Due   • Colorectal Cancer Screening  01/05/2026   • Hepatitis C Screening  Completed         Topic Date Due   • DTaP,Tdap,and Td Vaccines (1 - Tdap) Never done   • COVID-19 Vaccine (4 - Pfizer series) 01/14/2022   • Influenza Vaccine (1) 09/01/2023      Medicare Screening Tests and Risk Assessments:     Marlon is here for his Subsequent Wellness visit. Last Medicare Wellness visit information reviewed, patient interviewed and updates made to the record today. Health Risk Assessment:   Patient rates overall health as fair. Patient feels that their physical health rating is same. Patient is satisfied with their life. Eyesight was rated as slightly worse. Hearing was rated as slightly worse. Patient feels that their emotional and mental health rating is same. Patients states they are never, rarely angry. Patient states they are sometimes unusually tired/fatigued. Pain experienced in the last 7 days has been some. Patient's pain rating has been 6/10. Patient states that he has experienced no weight loss or gain in last 6 months. Depression Screening:   PHQ-2 Score: 0      Fall Risk Screening: In the past year, patient has experienced: no history of falling in past year      Home Safety:  Patient does not have trouble with stairs inside or outside of their home. Patient has working smoke alarms and has working carbon monoxide detector. Home safety hazards include: none. Nutrition:   Current diet is Regular, Low Cholesterol, Low Carb, No Added Salt and Limited junk food. Medications:   Patient is currently taking over-the-counter supplements.  OTC medications include: Aspirin. Patient is able to manage medications. Activities of Daily Living (ADLs)/Instrumental Activities of Daily Living (IADLs):   Walk and transfer into and out of bed and chair?: Yes  Dress and groom yourself?: Yes    Bathe or shower yourself?: Yes    Feed yourself? Yes  Do your laundry/housekeeping?: Yes  Manage your money, pay your bills and track your expenses?: Yes  Make your own meals?: No    Do your own shopping?: Yes    Previous Hospitalizations:   Any hospitalizations or ED visits within the last 12 months?: No      Advance Care Planning:   Living will: No    Durable POA for healthcare: No    Advanced directive: No    Advanced directive counseling given: Yes      PREVENTIVE SCREENINGS      Cardiovascular Screening:    General: Screening Not Indicated and History Lipid Disorder      Diabetes Screening:     General: Screening Not Indicated and History Diabetes      Colorectal Cancer Screening:     General: Screening Current      Prostate Cancer Screening:    General: Screening Not Indicated      Abdominal Aortic Aneurysm (AAA) Screening:    Risk factors include: age between 70-77 yo        General: Screening Current      Lung Cancer Screening:     General: Screening Not Indicated      Hepatitis C Screening:    General: Screening Current    Screening, Brief Intervention, and Referral to Treatment (SBIRT)    Screening  Typical number of drinks in a day: 0  Typical number of drinks in a week: 0  Interpretation: Low risk drinking behavior.     AUDIT-C Screenin) How often did you have a drink containing alcohol in the past year? never  2) How many drinks did you have on a typical day when you were drinking in the past year? 0  3) How often did you have 6 or more drinks on one occasion in the past year? never    AUDIT-C Score: 0  Interpretation: Score 0-3 (male): Negative screen for alcohol misuse    Single Item Drug Screening:  How often have you used an illegal drug (including marijuana) or a prescription medication for non-medical reasons in the past year? never    Single Item Drug Screen Score: 0  Interpretation: Negative screen for possible drug use disorder    Brief Intervention  Alcohol & drug use screenings were reviewed. No concerns regarding substance use disorder identified. Other Counseling Topics:   Regular weightbearing exercise and calcium and vitamin D intake. No results found. Physical Exam:     /70 (BP Location: Left arm, Patient Position: Sitting, Cuff Size: Standard)   Pulse 61   Temp 97.6 °F (36.4 °C) (Temporal)   Resp 16   Ht 5' 6" (1.676 m)   Wt 84.8 kg (187 lb)   SpO2 96%   BMI 30.18 kg/m²     Physical Exam  Vitals and nursing note reviewed. Constitutional:       General: He is not in acute distress. Appearance: Normal appearance. He is not ill-appearing. HENT:      Head: Normocephalic and atraumatic. Eyes:      General: No scleral icterus. Conjunctiva/sclera: Conjunctivae normal.   Neck:      Vascular: No carotid bruit. Cardiovascular:      Rate and Rhythm: Normal rate and regular rhythm. Pulses: no weak pulses          Dorsalis pedis pulses are 2+ on the right side and 2+ on the left side. Heart sounds: Normal heart sounds. No murmur heard. Pulmonary:      Effort: Pulmonary effort is normal. No respiratory distress. Breath sounds: Normal breath sounds. Abdominal:      General: Bowel sounds are normal. There is no distension. Palpations: Abdomen is soft. Musculoskeletal:         General: Normal range of motion. Cervical back: Neck supple. No rigidity. Right lower leg: No edema. Left lower leg: No edema. Feet:      Right foot:      Skin integrity: No ulcer, skin breakdown, erythema, warmth, callus or dry skin. Left foot:      Skin integrity: No ulcer, skin breakdown, erythema, warmth, callus or dry skin. Skin:     General: Skin is warm. Findings: No rash.    Neurological:      General: No focal deficit present. Mental Status: He is alert and oriented to person, place, and time. Psychiatric:         Mood and Affect: Mood normal.         Behavior: Behavior normal.         Thought Content: Thought content normal.      Diabetic Foot Exam    Patient's shoes and socks removed. Right Foot/Ankle   Right Foot Inspection  Skin Exam: skin normal and skin intact. No dry skin, no warmth, no callus, no erythema, no maceration, no abnormal color, no pre-ulcer, no ulcer and no callus. Toe Exam: ROM and strength within normal limits. Sensory   Vibration: intact  Proprioception: intact  Monofilament testing: intact    Vascular  The right DP pulse is 2+. Left Foot/Ankle  Left Foot Inspection  Skin Exam: skin normal and skin intact. No dry skin, no warmth, no erythema, no maceration, normal color, no pre-ulcer, no ulcer and no callus. Toe Exam: ROM and strength within normal limits. Sensory   Vibration: intact  Proprioception: intact  Monofilament testing: intact    Vascular  The left DP pulse is 2+.      Assign Risk Category  No deformity present  No loss of protective sensation  No weak pulses  Risk: 0      Alexandra Munson MD

## 2023-08-25 NOTE — PATIENT INSTRUCTIONS
Medicare Preventive Visit Patient Instructions  Thank you for completing your Welcome to Medicare Visit or Medicare Annual Wellness Visit today. Your next wellness visit will be due in one year (8/25/2024). The screening/preventive services that you may require over the next 5-10 years are detailed below. Some tests may not apply to you based off risk factors and/or age. Screening tests ordered at today's visit but not completed yet may show as past due. Also, please note that scanned in results may not display below. Preventive Screenings:  Service Recommendations Previous Testing/Comments   Colorectal Cancer Screening  · Colonoscopy    · Fecal Occult Blood Test (FOBT)/Fecal Immunochemical Test (FIT)  · Fecal DNA/Cologuard Test  · Flexible Sigmoidoscopy Age: 43-73 years old   Colonoscopy: every 10 years (May be performed more frequently if at higher risk)  OR  FOBT/FIT: every 1 year  OR  Cologuard: every 3 years  OR  Sigmoidoscopy: every 5 years  Screening may be recommended earlier than age 39 if at higher risk for colorectal cancer. Also, an individualized decision between you and your healthcare provider will decide whether screening between the ages of 77-80 would be appropriate.  Colonoscopy: 01/05/2016  FOBT/FIT: Not on file  Cologuard: Not on file  Sigmoidoscopy: Not on file    Screening Current     Prostate Cancer Screening Individualized decision between patient and health care provider in men between ages of 53-66   Medicare will cover every 12 months beginning on the day after your 50th birthday PSA: 1.10 ng/mL     Screening Not Indicated     Hepatitis C Screening Once for adults born between 1945 and 1965  More frequently in patients at high risk for Hepatitis C Hep C Antibody: 04/07/2023    Screening Current   Diabetes Screening 1-2 times per year if you're at risk for diabetes or have pre-diabetes Fasting glucose: No results in last 5 years (No results in last 5 years)  A1C: 6.4 % of total Hgb (6/16/2023)  Screening Not Indicated  History Diabetes   Cholesterol Screening Once every 5 years if you don't have a lipid disorder. May order more often based on risk factors. Lipid panel: 06/16/2023  Screening Not Indicated  History Lipid Disorder      Other Preventive Screenings Covered by Medicare:  1. Abdominal Aortic Aneurysm (AAA) Screening: covered once if your at risk. You're considered to be at risk if you have a family history of AAA or a male between the age of 70-76 who smoking at least 100 cigarettes in your lifetime. 2. Lung Cancer Screening: covers low dose CT scan once per year if you meet all of the following conditions: (1) Age 48-67; (2) No signs or symptoms of lung cancer; (3) Current smoker or have quit smoking within the last 15 years; (4) You have a tobacco smoking history of at least 20 pack years (packs per day x number of years you smoked); (5) You get a written order from a healthcare provider. 3. Glaucoma Screening: covered annually if you're considered high risk: (1) You have diabetes OR (2) Family history of glaucoma OR (3)  aged 48 and older OR (3)  American aged 72 and older  3. Osteoporosis Screening: covered every 2 years if you meet one of the following conditions: (1) Have a vertebral abnormality; (2) On glucocorticoid therapy for more than 3 months; (3) Have primary hyperparathyroidism; (4) On osteoporosis medications and need to assess response to drug therapy. 5. HIV Screening: covered annually if you're between the age of 14-79. Also covered annually if you are younger than 13 and older than 72 with risk factors for HIV infection. For pregnant patients, it is covered up to 3 times per pregnancy.     Immunizations:  Immunization Recommendations   Influenza Vaccine Annual influenza vaccination during flu season is recommended for all persons aged >= 6 months who do not have contraindications   Pneumococcal Vaccine   * Pneumococcal conjugate vaccine = PCV13 (Prevnar 13), PCV15 (Vaxneuvance), PCV20 (Prevnar 20)  * Pneumococcal polysaccharide vaccine = PPSV23 (Pneumovax) Adults 2364 years old: 1-3 doses may be recommended based on certain risk factors  Adults 72 years old: 1-2 doses may be recommended based off what pneumonia vaccine you previously received   Hepatitis B Vaccine 3 dose series if at intermediate or high risk (ex: diabetes, end stage renal disease, liver disease)   Tetanus (Td) Vaccine - COST NOT COVERED BY MEDICARE PART B Following completion of primary series, a booster dose should be given every 10 years to maintain immunity against tetanus. Td may also be given as tetanus wound prophylaxis. Tdap Vaccine - COST NOT COVERED BY MEDICARE PART B Recommended at least once for all adults. For pregnant patients, recommended with each pregnancy. Shingles Vaccine (Shingrix) - COST NOT COVERED BY MEDICARE PART B  2 shot series recommended in those aged 48 and above     Health Maintenance Due:      Topic Date Due   • Colorectal Cancer Screening  01/05/2026   • Hepatitis C Screening  Completed     Immunizations Due:      Topic Date Due   • DTaP,Tdap,and Td Vaccines (1 - Tdap) Never done   • COVID-19 Vaccine (4 - Pfizer series) 01/14/2022   • Influenza Vaccine (1) 09/01/2023     Advance Directives   What are advance directives? Advance directives are legal documents that state your wishes and plans for medical care. These plans are made ahead of time in case you lose your ability to make decisions for yourself. Advance directives can apply to any medical decision, such as the treatments you want, and if you want to donate organs. What are the types of advance directives? There are many types of advance directives, and each state has rules about how to use them. You may choose a combination of any of the following:  · Living will: This is a written record of the treatment you want.  You can also choose which treatments you do not want, which to limit, and which to stop at a certain time. This includes surgery, medicine, IV fluid, and tube feedings. · Durable power of  for healthcare [facility]): This is a written record that states who you want to make healthcare choices for you when you are unable to make them for yourself. This person, called a proxy, is usually a family member or a friend. You may choose more than 1 proxy. · Do not resuscitate (DNR) order:  A DNR order is used in case your heart stops beating or you stop breathing. It is a request not to have certain forms of treatment, such as CPR. A DNR order may be included in other types of advance directives. · Medical directive: This covers the care that you want if you are in a coma, near death, or unable to make decisions for yourself. You can list the treatments you want for each condition. Treatment may include pain medicine, surgery, blood transfusions, dialysis, IV or tube feedings, and a ventilator (breathing machine). · Values history: This document has questions about your views, beliefs, and how you feel and think about life. This information can help others choose the care that you would choose. Why are advance directives important? An advance directive helps you control your care. Although spoken wishes may be used, it is better to have your wishes written down. Spoken wishes can be misunderstood, or not followed. Treatments may be given even if you do not want them. An advance directive may make it easier for your family to make difficult choices about your care. Weight Management   Why it is important to manage your weight:  Being overweight increases your risk of health conditions such as heart disease, high blood pressure, type 2 diabetes, and certain types of cancer. It can also increase your risk for osteoarthritis, sleep apnea, and other respiratory problems. Aim for a slow, steady weight loss.  Even a small amount of weight loss can lower your risk of health problems. How to lose weight safely:  A safe and healthy way to lose weight is to eat fewer calories and get regular exercise. You can lose up about 1 pound a week by decreasing the number of calories you eat by 500 calories each day. Healthy meal plan for weight management:  A healthy meal plan includes a variety of foods, contains fewer calories, and helps you stay healthy. A healthy meal plan includes the following:  · Eat whole-grain foods more often. A healthy meal plan should contain fiber. Fiber is the part of grains, fruits, and vegetables that is not broken down by your body. Whole-grain foods are healthy and provide extra fiber in your diet. Some examples of whole-grain foods are whole-wheat breads and pastas, oatmeal, brown rice, and bulgur. · Eat a variety of vegetables every day. Include dark, leafy greens such as spinach, kale, kathleen greens, and mustard greens. Eat yellow and orange vegetables such as carrots, sweet potatoes, and winter squash. · Eat a variety of fruits every day. Choose fresh or canned fruit (canned in its own juice or light syrup) instead of juice. Fruit juice has very little or no fiber. · Eat low-fat dairy foods. Drink fat-free (skim) milk or 1% milk. Eat fat-free yogurt and low-fat cottage cheese. Try low-fat cheeses such as mozzarella and other reduced-fat cheeses. · Choose meat and other protein foods that are low in fat. Choose beans or other legumes such as split peas or lentils. Choose fish, skinless poultry (chicken or turkey), or lean cuts of red meat (beef or pork). Before you cook meat or poultry, cut off any visible fat. · Use less fat and oil. Try baking foods instead of frying them. Add less fat, such as margarine, sour cream, regular salad dressing and mayonnaise to foods. Eat fewer high-fat foods. Some examples of high-fat foods include french fries, doughnuts, ice cream, and cakes. · Eat fewer sweets.   Limit foods and drinks that are high in sugar. This includes candy, cookies, regular soda, and sweetened drinks. Exercise:  Exercise at least 30 minutes per day on most days of the week. Some examples of exercise include walking, biking, dancing, and swimming. You can also fit in more physical activity by taking the stairs instead of the elevator or parking farther away from stores. Ask your healthcare provider about the best exercise plan for you. © Copyright [employer] 2018 Information is for End User's use only and may not be sold, redistributed or otherwise used for commercial purposes.  All illustrations and images included in CareNotes® are the copyrighted property of A.D.A.M., Inc. or  [address]

## 2023-08-25 NOTE — ASSESSMENT & PLAN NOTE
Patient was treated with acute bronchitis with bronchospasm and [place] 1 to 2 months ago. He reportedly had 3 days of Zithromax and inhalers. Symptoms overall improved, he reports lingering cough. Start Augmentin and Trelegy.   Patient will contact me in 7 to 10 days if symptoms are not improving

## 2023-08-31 NOTE — ASSESSMENT & PLAN NOTE
Lab Results   Component Value Date    HGBA1C 6.4 (H) 06/16/2023    Continue metformin ER 1,000 mg daily  Foot exam performed today

## 2023-09-01 ENCOUNTER — TELEPHONE (OUTPATIENT)
Dept: FAMILY MEDICINE CLINIC | Facility: CLINIC | Age: 73
End: 2023-09-01

## 2023-09-01 NOTE — TELEPHONE ENCOUNTER
Called pt back. Informed him to try the miralax. Pt is requesting something stronger that is prescribed.

## 2023-09-01 NOTE — TELEPHONE ENCOUNTER
Patient called and stated that he is taking the amoxicillin and since he started he has been experiencing constipation. Is there something that can be prescribed? He already had taken metamucil.   Please call to advise

## 2023-09-01 NOTE — TELEPHONE ENCOUNTER
If patient has not tried MiraLAX-this is my recommendation. If MiraLAX is not helpful that she can also add Dulcolax/bisacodyl.     Thank you

## 2023-09-12 ENCOUNTER — EVALUATION (OUTPATIENT)
Dept: PHYSICAL THERAPY | Facility: REHABILITATION | Age: 73
End: 2023-09-12
Payer: COMMERCIAL

## 2023-09-12 DIAGNOSIS — M47.816 LUMBAR SPONDYLOSIS: ICD-10-CM

## 2023-09-12 DIAGNOSIS — M54.16 LUMBAR RADICULOPATHY: ICD-10-CM

## 2023-09-12 PROCEDURE — 97162 PT EVAL MOD COMPLEX 30 MIN: CPT | Performed by: PHYSICAL THERAPIST

## 2023-09-12 PROCEDURE — 97110 THERAPEUTIC EXERCISES: CPT | Performed by: PHYSICAL THERAPIST

## 2023-09-12 NOTE — PROGRESS NOTES
PT Evaluation     Today's date: 2023  Patient name: Alejandro Castillo  : 1950  MRN: [id number]  Referring provider: Pancho Andrew MD  Dx:   Encounter Diagnosis     ICD-10-CM    1. Lumbar radiculopathy  M54.16 Ambulatory referral to Physical Therapy      2. Lumbar spondylosis  M47.816 Ambulatory referral to Physical Therapy          Start Time: 08  Stop Time: 930  Total time in clinic (min): 45 minutes    Assessment  Assessment details: Alejandro Castillo is a 74 y.o. male who presents to therapy for chronic low back pain with MRI findings showing stenosis. Sx's increases in with prolonged standing, walking and lumbar ext and improve with flexion and sitting. 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 Pt will benefit from skilled outpatient PT to address the below stated impairments, to address therapy goals, to reduce pain, and improve function. Therapist explained to pt: findings of IE, rehab diagnosis, and POC. Pt-centered goals reviewed and confirmed by pt. Instruction also given for HEP. Pt expressed verbal agreement and understanding and verified understanding via teach back method. Pt also expressed satisfaction that their current concerns were addressed at the end of the session. Impairments: abnormal gait, abnormal or restricted ROM, activity intolerance, impaired physical strength, lacks appropriate home exercise program and pain with function    Symptom irritability: highBarriers to therapy: None   Understanding of Dx/Px/POC: good   Prognosis: good    Goals  Short term goals: to be met in 3-4 weeks  Pt independent with initial HEP, rationale, technique and frequency, for ROM and pain control. Pt will report at least a 25% reduction in subjective pain complaints/symptoms to better manage ADLs and household chores.    Improve max pain level by 2 points on the numeric pain rating scale indicating a clinically significant reduction in pain level.  Pt will achieve an improvement in FOTO score indicating an improvement in pain and function. Long term goals: to be met in 6-8 weeks  Pt will report a 50% or > reduction in subjective pain complaints/symptoms to better manage ADLs and household chores. Pt will improve FOTO score further indicating an overall improvement in pain and function   Pt will be able to perform ADLs, iADLS, and work duties with a max pain level of 5/10 for overall reduction in pain. Pt independent with rationale, technique and plan for performance of advanced HEP to ensure independent self-management of symptoms upon discharge. Plan  Patient would benefit from: skilled physical therapy  Planned modality interventions: TENS, thermotherapy: hydrocollator packs, traction, ultrasound, cryotherapy and low level laser therapy  Planned therapy interventions: body mechanics training, therapeutic training, therapeutic exercise, therapeutic activities, stretching, strengthening, neuromuscular re-education, patient education, home exercise program, functional ROM exercises, flexibility, manual therapy, Acevedo taping, joint mobilization and balance  Frequency: 1-2x/week. Duration in weeks: 8  Treatment plan discussed with: patient        Subjective Evaluation    History of Present Illness  Mechanism of injury: Pt presents for chronic low back pain. Had MRI. Was told L1 is affecting his L2-3 area. Was told he may be a surgical candidate, but pt would like to avoid this if possible if he can manage pain with PT. Had injections June 14th. States the injection helped 1.5 months. Still feels a little better but pain is slowly returning. MRI done 8/8/22: Spondylotic degenerative changes are seen diffusely most prominently at L2-3 where there is severe central and bilateral lateral recess stenosis as well as moderate to severe left foraminal narrowing. Thecal sac is compressed with nerve root stretched and redundant above this level. Correlate for left L2 radiculopathy. Spondylotic degenerative changes are seen at remaining levels including L3-4 where there is moderate to severe central and lateral recess stenosis. Currently having pain in the low back. States he cannot stand straight. Walking is difficult and feels he needs to bend forward. Denies pain into the legs lately. Does feel pins and needles and shock in the big toe on the R at times when taking a hot shower. Feels this is due to the hot water. Also has difficulty sleeping. Denies any additional n/t. Does feel the LLE is weaker. Denies difficulty urinating.    Patient Goals  Patient goals for therapy: decreased pain, return to sport/leisure activities, return to work, independence with ADLs/IADLs, increased strength and increased motion  Patient goal: relieve pain  Pain  Current pain ratin  At best pain ratin  At worst pain ratin  Location: low back and into B groins and thighs  Quality: burning, radiating and sharp (electrical shock)  Aggravating factors: walking and standing  Progression: worsening    Social Support  Lives with: spouse    Employment status: working (professor)    Diagnostic Tests  MRI studies: abnormal  Treatments  Previous treatment: physical therapy and medication        Objective    Objective: See treatment diary below    Lumbar AROM  Lumbar flex: hands to floor, reduces pain   Lumbar ext: unable to ext, increases pain  Lumbar R rotation WNL  Lumbar L rotation WNL       Lower quarter screen  LE reflexes   - Patellar and achilles absent bilat    - Neg clonus BLE   - LE sensation: dec LLE    (-) slump BLE  (-) neg  SLR BLE  (+) ERICA L hip with reproduction of LBP  (-) FADIR bilat  (-) ERICA R hip  LE MMT grossly 5/5 throughout, except L hip flexion 4-/5    MMT  Hip ext  R = 3+ with LBP  L = 4-     Hip abd  R = 4+  L = 4+     Mobility testing  TTP L4-5 with increased mobility  Hypomobility throughout thoracic spine with PA mobility testing  Increased tone lumbar paraspinals  L>R with TTP     Gait:  Ambulates with flexed hips/slight forward trunk flexion    Trialed e-stim for pain relief.  Will assess response next visit       Precautions: HTN, hx of back surgery    FOTO  9/12 = 49/57        Manuals 9/12            S/l lumbar flexion mob             Cupping lumbar parapsinals                                       Neuro Re-Ed                                                                                                        Ther Ex             VG for LE strength and ROM L5 10' with TENS            key pose stretch with PT OP nv            Man hip flexor stretch                                                                                           Ther Activity                                       Gait Training                                       Modalities

## 2023-09-20 ENCOUNTER — OFFICE VISIT (OUTPATIENT)
Dept: PHYSICAL THERAPY | Facility: REHABILITATION | Age: 73
End: 2023-09-20
Payer: COMMERCIAL

## 2023-09-20 DIAGNOSIS — M47.816 LUMBAR SPONDYLOSIS: ICD-10-CM

## 2023-09-20 DIAGNOSIS — M54.16 LUMBAR RADICULOPATHY: Primary | ICD-10-CM

## 2023-09-20 PROCEDURE — 97110 THERAPEUTIC EXERCISES: CPT | Performed by: PHYSICAL THERAPIST

## 2023-09-20 NOTE — PROGRESS NOTES
Daily Note     Today's date: 2023  Patient name: Natalie Lockett  : 1950  MRN: [id number]  Referring provider: Chris Jaeger MD  Dx:   Encounter Diagnosis     ICD-10-CM    1. Lumbar radiculopathy  M54.16       2. Lumbar spondylosis  M47.816           Start Time: 1000  Stop Time: 1040  Total time in clinic (min): 40 minutes    Subjective: Pt states the Estim last visit helped a bit. Objective: See treatment diary below    MMT  L hip flexion 4-/5    Hip ext  R = 3+ with LBP  L = 4-      Hip abd  R = 4+  L = 4+    Assessment: Trialed e-stim for pain relief. Pt responded favorably to e-stim for low back pain control. Recommend ordering Home Estim for shoulder/ quad atrophy for strength, pain and function. Pt also responded well to stretches. Pt will benefit from continued skilled outpatient PT to improve mobility, to address therapy goals, to reduce pain, and improve function. Plan: Continue per plan of care. Progress treatment as tolerated.        Precautions: HTN, hx of back surgery    FOTO   = 49/57        Manuals            S/l lumbar flexion mob             Cupping lumbar parapsinals                                       Neuro Re-Ed                                                                                                        Ther Ex             VG for LE strength and ROM L5 10' with TENS L7 12' with TENS           key pose stretch nv 10"x10           Man hip flexor stretch  5' ea           Prone hip flexor stretch with strap  10"x10 ea                                                                            Ther Activity                                       Gait Training                                       Modalities

## 2023-09-22 ENCOUNTER — OFFICE VISIT (OUTPATIENT)
Dept: PHYSICAL THERAPY | Facility: REHABILITATION | Age: 73
End: 2023-09-22
Payer: COMMERCIAL

## 2023-09-22 DIAGNOSIS — M54.16 LUMBAR RADICULOPATHY: Primary | ICD-10-CM

## 2023-09-22 DIAGNOSIS — M47.816 LUMBAR SPONDYLOSIS: ICD-10-CM

## 2023-09-22 PROCEDURE — 97140 MANUAL THERAPY 1/> REGIONS: CPT

## 2023-09-22 PROCEDURE — 97110 THERAPEUTIC EXERCISES: CPT

## 2023-09-22 PROCEDURE — 97112 NEUROMUSCULAR REEDUCATION: CPT

## 2023-09-22 NOTE — PROGRESS NOTES
Daily Note     Today's date: 2023  Patient name: Raz Hills  : 1950  MRN: [id number]  Referring provider: Matilda Gold MD  Dx:   Encounter Diagnosis     ICD-10-CM    1. Lumbar radiculopathy  M54.16       2. Lumbar spondylosis  M47.816           Start Time: 0845  Stop Time: 4554  Total time in clinic (min): 40 minutes    Subjective: Pt reports he experienced increased pain in L LB and groin following his LV. Unsure of what may have caused this increase of symptoms. Objective: See treatment diary below      Assessment: Tolerated treatment well. Continued with program as outlined below. Increased pain experienced following his LV likely from hip flexor stretching. Does present with moderate soft tissue restriction along bilat hip flexor musculature, which began to resolve with manual STM. Progressed program with addition of core stability exercises, in effort to improve lumbar support and reduce stress to these structures. Slight discomfort to bilat hip flexors during TrA, but tolerable. Patient would benefit from continued PT to further reduce frequency/intensity of pain and maximize overall function with ADL's. Plan: Continue per plan of care.       Precautions: HTN, hx of back surgery    FOTO   = 49/57        Manuals           S/l lumbar flexion mob             Cupping lumbar parapsinals             Bilat Hip flexor STM   AFB                       Neuro Re-Ed             TrA   5"x20          TrA + ball squeeze   3"x10                                                                           Ther Ex             VG for LE strength and ROM L5 10' with TENS L7 12' with TENS L7 10' w/ TENS          key pose stretch nv 10"x10           Man hip flexor stretch  5' ea held          Prone hip flexor stretch with strap  10"x10 ea held          [facility]   5"x10 ea                                                              Ther Activity Gait Training                                       Modalities

## 2023-09-29 ENCOUNTER — OFFICE VISIT (OUTPATIENT)
Dept: PHYSICAL THERAPY | Facility: REHABILITATION | Age: 73
End: 2023-09-29
Payer: COMMERCIAL

## 2023-09-29 DIAGNOSIS — M54.16 LUMBAR RADICULOPATHY: Primary | ICD-10-CM

## 2023-09-29 DIAGNOSIS — M47.816 LUMBAR SPONDYLOSIS: ICD-10-CM

## 2023-09-29 PROCEDURE — 97032 APPL MODALITY 1+ESTIM EA 15: CPT | Performed by: PHYSICAL THERAPIST

## 2023-09-29 PROCEDURE — 97110 THERAPEUTIC EXERCISES: CPT | Performed by: PHYSICAL THERAPIST

## 2023-09-29 PROCEDURE — 97140 MANUAL THERAPY 1/> REGIONS: CPT | Performed by: PHYSICAL THERAPIST

## 2023-09-29 NOTE — PROGRESS NOTES
Daily Note     Today's date: 2023  Patient name: Heather Wells  : 1950  MRN: [id number]  Referring provider: Bing Pittman MD  Dx:   Encounter Diagnosis     ICD-10-CM    1. Lumbar radiculopathy  M54.16       2. Lumbar spondylosis  M47.816           Start Time: 1015  Stop Time: 1100  Total time in clinic (min): 45 minutes    Subjective: Pt reports his groin pain is better since last visit but still there a little. Reports his L lumbar region is the most bothersome today. Objective: See treatment diary below      Assessment: Tolerated treatment well. Held on hip flexor stretching as this most likely contributing to anterior hip/groin pain last visit. Instructed pt in use of home E-stim unit and provided pt with unit that was delivered. Pt demontsrated and verbalized understanding. He responded well to stretching and manuals performed today. Pt will benefit from continued skilled outpatient PT to improve strength, to address therapy goals, to reduce pain, and improve function. Plan: Continue per plan of care.       Precautions: HTN, hx of back surgery    FOTO   = 49/57        Manuals          S/l lumbar flexion mob    L breaking bread stretch/ STM L lumbar paraspinal in sidelying - DS         Cupping lumbar parapsinals             Bilat Hip flexor STM   AFB          BLE LAD    DS         Neuro Re-Ed             TrA   5"x20          TrA + ball squeeze   3"x10                                                                           Ther Ex             VG for LE strength and ROM L5 10' with TENS L7 12' with TENS L7 10' w/ TENS L7 10' w/ TENS         key pose stretch nv 10"x10  10"x10         Man hip flexor stretch  5' ea held held         Prone hip flexor stretch with strap  10"x10 ea held held         [facility]   5"x10 ea man         Hams stretch    man                                                Ther Activity                                       Gait Training Modalities             TENs unit    Pt edu and delivery

## 2023-10-04 ENCOUNTER — OFFICE VISIT (OUTPATIENT)
Dept: PHYSICAL THERAPY | Facility: REHABILITATION | Age: 73
End: 2023-10-04
Payer: COMMERCIAL

## 2023-10-04 DIAGNOSIS — M47.816 LUMBAR SPONDYLOSIS: ICD-10-CM

## 2023-10-04 DIAGNOSIS — M54.16 LUMBAR RADICULOPATHY: Primary | ICD-10-CM

## 2023-10-04 PROCEDURE — 97110 THERAPEUTIC EXERCISES: CPT

## 2023-10-04 PROCEDURE — 97140 MANUAL THERAPY 1/> REGIONS: CPT

## 2023-10-04 NOTE — PROGRESS NOTES
Daily Note     Today's date: 10/4/2023  Patient name: Wen Lambert  : 1950  MRN: [id number]  Referring provider: Ayesha Nieves MD  Dx:   Encounter Diagnosis     ICD-10-CM    1. Lumbar radiculopathy  M54.16       2. Lumbar spondylosis  M47.816           Start Time: 0845  Stop Time: 09  Total time in clinic (min): 45 minutes    Subjective: Pt reports continued pain primarily in L LB. Still gets increased symptoms after a long day at work, on his feet all day. Has a hard time standing up straight without pain in L LB. Has been using his TENS unit which helps a little bit. Groin pain still a little better. Objective: See treatment diary below. Some pain in L LB with active R hip flex in supine. Improved slightly following manual techniques performed this visit. Assessment: Tolerated treatment well. Continued with program as outlined below, focusing primarily on mobility of lumbar spine. Is very stiff and present with increased tone along L lumbar paraspinals. Patient would benefit from continued PT to further reduce pain, increase mobility, and maximize overall function. Plan: Continue per plan of care.       Precautions: HTN, hx of back surgery    FOTO   = 49/57        Manuals  10/        S/l lumbar flexion mob    L breaking bread stretch/ STM L lumbar paraspinal in sidelying - DS L breaking bread stretch/ STM L lumbar paraspinal in sidelying - AFB        Cupping lumbar parapsinals             Bilat Hip flexor STM   AFB          BLE LAD    DS [employer]        Neuro Re-Ed             TrA   5"x20          TrA + ball squeeze   3"x10                                                                           Ther Ex             VG for LE strength and ROM L5 10' with TENS L7 12' with TENS L7 10' w/ TENS L7 10' w/ TENS L7 10'        key pose stretch nv 10"x10  10"x10 10"x10 ea  C, R        Man hip flexor stretch  5' ea held held held        Prone hip flexor stretch with strap  10"x10 ea held held held        [facility]   5"x10 ea man Man 5"x10 ea        Hams stretch    man Man 30"x3 ea                                               Ther Activity                                       Gait Training                                       Modalities             TENs unit    Pt edu and delivery

## 2023-10-06 ENCOUNTER — OFFICE VISIT (OUTPATIENT)
Dept: PHYSICAL THERAPY | Facility: REHABILITATION | Age: 73
End: 2023-10-06
Payer: COMMERCIAL

## 2023-10-06 DIAGNOSIS — M54.16 LUMBAR RADICULOPATHY: Primary | ICD-10-CM

## 2023-10-06 DIAGNOSIS — M47.816 LUMBAR SPONDYLOSIS: ICD-10-CM

## 2023-10-06 PROCEDURE — 97110 THERAPEUTIC EXERCISES: CPT

## 2023-10-06 PROCEDURE — 97140 MANUAL THERAPY 1/> REGIONS: CPT

## 2023-10-06 PROCEDURE — 97112 NEUROMUSCULAR REEDUCATION: CPT

## 2023-10-06 NOTE — PROGRESS NOTES
Daily Note     Today's date: 10/6/2023  Patient name: Vasu Gonzalez  : 1950  MRN: [id number]  Referring provider: Alexandra Munson MD  Dx:   Encounter Diagnosis     ICD-10-CM    1. Lumbar radiculopathy  M54.16       2. Lumbar spondylosis  M47.816                      Subjective: Pt reports not much change in symptoms since LV. Does feel like the stretches and exercises performed LV were beneficial.  Feels the best when he is able to rest and not on his feet/standing as much. Objective: See treatment diary below. VG not performed today d/t being in use by other patients. Assessment: Tolerated treatment well. Continues to present with increased stiffness and soft tissue restriction along L lumbar paraspinals, though did feel slightly better today compared to LV. Responds well to manual stretching and STM breaking bread. Trialled pball crushers to further improve core stability and reduce stress on LB. Patient would benefit from continued PT to further reduce pain and maximize overall function with less frequency of symptoms. Plan: Continue per plan of care.       Precautions: HTN, hx of back surgery    FOTO   = 49/57        Manuals 9/12 9/20 9/22 9/29 10/4 10/       S/l lumbar flexion mob    L breaking bread stretch/ STM L lumbar paraspinal in sidelying - DS L breaking bread stretch/ STM L lumbar paraspinal in sidelying - [employer] L breaking bread stretch/ STM L lumbar paraspinal in sidelying - [employer]B       Cupping lumbar parapsinals             Bilat Hip flexor STM   AFB          BLE LAD    DS [employer]       Neuro Re-Ed             TrA   5"x20          TrA + ball squeeze   3"x10          pball        Fwd/diag  5"x10 ea                                                           Ther Ex             VG for LE strength and ROM L5 10' with TENS L7 12' with TENS L7 10' w/ TENS L7 10' w/ TENS L7 10' nv       key pose stretch nv 10"x10  10"x10 10"x10 ea  C, R 10"x10 ea  C, R Man hip flexor stretch  5' ea held held held held       Prone hip flexor stretch with strap  10"x10 ea held held held held       [facility]   5"x10 ea man Man 5"x10 ea +DKTC  Man 5"x10 ea       Hams stretch    man Man 30"x3 ea Man 30"x3 ea                                              Ther Activity                                       Gait Training                                       Modalities             TENs unit    Pt edu and delivery

## 2023-10-11 ENCOUNTER — OFFICE VISIT (OUTPATIENT)
Dept: PHYSICAL THERAPY | Facility: REHABILITATION | Age: 73
End: 2023-10-11
Payer: COMMERCIAL

## 2023-10-11 DIAGNOSIS — M54.16 LUMBAR RADICULOPATHY: Primary | ICD-10-CM

## 2023-10-11 PROCEDURE — 97140 MANUAL THERAPY 1/> REGIONS: CPT | Performed by: PHYSICAL THERAPIST

## 2023-10-11 PROCEDURE — 97110 THERAPEUTIC EXERCISES: CPT | Performed by: PHYSICAL THERAPIST

## 2023-10-11 NOTE — PROGRESS NOTES
Daily Note     Today's date: 10/11/2023  Patient name: Riaz Tran  : 1950  MRN: [id number]  Referring provider: Meenakshi Meyer MD  Dx:   Encounter Diagnosis     ICD-10-CM    1. Lumbar radiculopathy  M54.16             Start Time: 94  Stop Time: 8040  Total time in clinic (min): 43 minutes    Subjective: Pt reports his L low back is bothersome. Objective: See treatment diary below. Assessment: Favorabvle response to tx, especially STM to L lumbar paraspinals. Pt will benefit from continued skilled outpatient PT to improve strength, to address therapy goals, to reduce pain, and improve function. Plan: Continue per plan of care.       Precautions: HTN, hx of back surgery    FOTO   = 49/57        Manuals 9/12 9/20 9/22 9/29 10/4 10/6 10/11      S/l lumbar flexion mob    L breaking bread stretch/ STM L lumbar paraspinal in sidelying - DS L breaking bread stretch/ STM L lumbar paraspinal in sidelying - [employer] L breaking bread stretch/ STM L lumbar paraspinal in sidelying - [employer] L breaking bread stretch/ STM L lumbar paraspinal in sidelying - DS      Hip distraction with belt       DS      Cupping lumbar parapsinals             Bilat Hip flexor STM   AFB          BLE LAD    DS [employer] L - DS      Neuro Re-Ed             TrA   5"x20          TrA + ball squeeze   3"x10          pball        Fwd/diag  5"x10 ea                                                           Ther Ex             VG for LE strength and ROM L5 10' with TENS L7 12' with TENS L7 10' w/ TENS L7 10' w/ TENS L7 10' nv L7 12'      key pose stretch nv 10"x10  10"x10 10"x10 ea  C, R 10"x10 ea  C, R 10"x10      Man hip flexor stretch  5' ea held held held held       Prone hip flexor stretch with strap  10"x10 ea held held held held       [facility]   5"x10 ea man Man 5"x10 ea +DKTC  Man 5"x10 ea man      Hams stretch    man Man 30"x3 ea Man 29"x4 ea man                                             Ther Activity Gait Training                                       Modalities             TENs unit    Pt edu and delivery

## 2023-10-13 ENCOUNTER — APPOINTMENT (OUTPATIENT)
Dept: PHYSICAL THERAPY | Facility: REHABILITATION | Age: 73
End: 2023-10-13
Payer: COMMERCIAL

## 2023-10-15 PROBLEM — R05.9 COUGH: Status: RESOLVED | Noted: 2023-08-14 | Resolved: 2023-10-15

## 2023-10-18 ENCOUNTER — OFFICE VISIT (OUTPATIENT)
Dept: PHYSICAL THERAPY | Facility: REHABILITATION | Age: 73
End: 2023-10-18
Payer: COMMERCIAL

## 2023-10-18 DIAGNOSIS — M47.816 LUMBAR SPONDYLOSIS: ICD-10-CM

## 2023-10-18 DIAGNOSIS — M54.16 LUMBAR RADICULOPATHY: Primary | ICD-10-CM

## 2023-10-18 PROCEDURE — 97140 MANUAL THERAPY 1/> REGIONS: CPT

## 2023-10-18 PROCEDURE — 97110 THERAPEUTIC EXERCISES: CPT

## 2023-10-18 NOTE — PROGRESS NOTES
Daily Note     Today's date: 10/18/2023  Patient name: Genaro Clemens  : 1950  MRN: [id number]  Referring provider: Lalo Arroyo MD  Dx:   Encounter Diagnosis     ICD-10-CM    1. Lumbar radiculopathy  M54.16       2. Lumbar spondylosis  M47.816           Start Time: 0758  Stop Time: 0845  Total time in clinic (min): 47 minutes    Subjective: Pt reports L low back pain/discomfort is still present with no real change since LV. Notes he was standing for roughly 7 hrs yesterday with really bothered his low back by the time he got home. Objective: See treatment diary below      Assessment: Tolerated treatment well. Continued with program as outlined below. Much tighter on L HS than R HS. Continues to respond well to manual techniques and stretches, feeling more loose and relaxed by end of treatment. Patient would benefit from continued PT to further reduce soft tissue restrictions, increase lumbar/hip mobility, and maximize overall function. Plan: Continue per plan of care.       Precautions: HTN, hx of back surgery    FOTO   = 49/57        Manuals 9/12 9/20 9/22 9/29 10/4 10/6 10/11 10/18     S/l lumbar flexion mob    L breaking bread stretch/ STM L lumbar paraspinal in sidelying - DS L breaking bread stretch/ STM L lumbar paraspinal in sidelying - [employer] L breaking bread stretch/ STM L lumbar paraspinal in sidelying - [employer] L breaking bread stretch/ STM L lumbar paraspinal in sidelying - DS L breaking bread stretch/ STM L lumbar paraspinal in sidelying - DS     Hip distraction with belt       DS [employer]     Cupping lumbar parapsinals             Bilat Hip flexor STM   AFB          BLE LAD    DS [employer] AF[employer] L - DS [employer]     Neuro Re-Ed             TrA   5"x20          TrA + ball squeeze   3"x10                                                              pball        Fwd/diag  5"x10 ea                                                           Ther Ex             VG for LE strength and ROM L5 10' with TENS L7 12' with TENS L7 10' w/ TENS L7 10' w/ TENS L7 10' nv L7 12' L7 10'     key pose stretch nv 10"x10  10"x10 10"x10 ea  C, R 10"x10 ea  C, R 10"x10 10"x10     Man hip flexor stretch  5' ea held held held held       Prone hip flexor stretch with strap  10"x10 ea held held held held       [facility]   5"x10 ea man Man 5"x10 ea +DKTC  Man 10"x6 ea man man     Hams stretch    man Man 30"x3 ea Man 29"x4 ea man man                                            Ther Activity                                       Gait Training                                       Modalities             TENs unit    Pt edu and delivery

## 2023-10-20 ENCOUNTER — OFFICE VISIT (OUTPATIENT)
Dept: PHYSICAL THERAPY | Facility: REHABILITATION | Age: 73
End: 2023-10-20
Payer: COMMERCIAL

## 2023-10-20 DIAGNOSIS — M47.816 LUMBAR SPONDYLOSIS: ICD-10-CM

## 2023-10-20 DIAGNOSIS — M54.16 LUMBAR RADICULOPATHY: Primary | ICD-10-CM

## 2023-10-20 PROCEDURE — 97110 THERAPEUTIC EXERCISES: CPT | Performed by: PHYSICAL THERAPIST

## 2023-10-20 PROCEDURE — 97140 MANUAL THERAPY 1/> REGIONS: CPT | Performed by: PHYSICAL THERAPIST

## 2023-10-20 NOTE — PROGRESS NOTES
Daily Note     Today's date: 10/20/2023  Patient name: Lesli Fleming  : 1950  MRN: [id number]  Referring provider: Constantino Gonsalez MD  Dx:   Encounter Diagnosis     ICD-10-CM    1. Lumbar radiculopathy  M54.16       2. Lumbar spondylosis  M47.816             Start Time: 0849  Stop Time: 0930  Total time in clinic (min): 41 minutes    Subjective: Pt states he feels the LAD and s/lying breaking bread stretch with STM are helping the most. Feels PT is helping to manage his sx's an keep his pain at bay. Objective: See treatment diary below    Assessment: Focused on manuals that help with pain management. Pt noted improvement in pain post tx. Pt will benefit from continued skilled outpatient PT to manage pain. Plan: Continue per plan of care.       Precautions: HTN, hx of back surgery    FOTO   = 49/57        Manuals 9/12 9/20 9/22 9/29 10/4 10/6 10/11 10/18 10/20    S/l lumbar flexion mob    L breaking bread stretch/ STM L lumbar paraspinal in sidelying - DS L breaking bread stretch/ STM L lumbar paraspinal in sidelying - [employer] L breaking bread stretch/ STM L lumbar paraspinal in sidelying - AF[employer] L breaking bread stretch/ STM L lumbar paraspinal in sidelying - DS L breaking bread stretch/ STM L lumbar paraspinal in sidelying - DS     Hip distraction with belt       DS [employer]     Cupping lumbar parapsinals             Bilat Hip flexor STM   AFB          BLE LAD    DS [employer] AF[employer] L - DS [employer]     Neuro Re-Ed             TrA   5"x20          TrA + ball squeeze   3"x10                                                              pball        Fwd/diag  5"x10 ea                                                           Ther Ex             VG for LE strength and ROM L5 10' with TENS L7 12' with TENS L7 10' w/ TENS L7 10' w/ TENS L7 10' nv L7 12' L7 10' L7 10'    key pose stretch nv 10"x10  10"x10 10"x10 ea  C, R 10"x10 ea  C, R 10"x10 10"x10     Man hip flexor stretch  5' ea held held held held Prone hip flexor stretch with strap  10"x10 ea held held held held       [facility]   5"x10 ea man Man 5"x10 ea +DKTC  Man 10"x6 ea man man     Hams stretch    man Man 30"x3 ea Man 29"x4 ea man man                                            Ther Activity                                       Gait Training                                       Modalities             TENs unit    Pt edu and delivery

## 2023-10-25 ENCOUNTER — OFFICE VISIT (OUTPATIENT)
Dept: PHYSICAL THERAPY | Facility: REHABILITATION | Age: 73
End: 2023-10-25
Payer: COMMERCIAL

## 2023-10-25 DIAGNOSIS — M47.816 LUMBAR SPONDYLOSIS: ICD-10-CM

## 2023-10-25 DIAGNOSIS — M54.16 LUMBAR RADICULOPATHY: Primary | ICD-10-CM

## 2023-10-25 PROCEDURE — 97110 THERAPEUTIC EXERCISES: CPT

## 2023-10-25 PROCEDURE — 97140 MANUAL THERAPY 1/> REGIONS: CPT

## 2023-10-25 NOTE — PROGRESS NOTES
Daily Note     Today's date: 10/25/2023  Patient name: Marisol Steward  : 1950  MRN: [id number]  Referring provider: Suman Weinstein MD  Dx:   Encounter Diagnosis     ICD-10-CM    1. Lumbar radiculopathy  M54.16       2. Lumbar spondylosis  M47.816                      Subjective: pt stated that he is able to manage sx with PT and stretching. The pain usually returns a few hours later. Objective: See treatment diary below      Assessment: Tolerated treatment well. Patient continues to respond well to manual interventions for pain management. Improvement in standing posture noted post STM to bilat hip flexors and iliopsoas. Continued PT would be beneficial to improve function. Plan: Continue per plan of care.        Precautions: HTN, hx of back surgery    FOTO   = 49/57        Manuals 9/12 9/20 9/22 9/29 10/4 10/6 10/11 10/18 10/20 10/25   S/l lumbar flexion mob    L breaking bread stretch/ STM L lumbar paraspinal in sidelying - DS L breaking bread stretch/ STM L lumbar paraspinal in sidelying - AF[employer] L breaking bread stretch/ STM L lumbar paraspinal in sidelying - AF[employer] L breaking bread stretch/ STM L lumbar paraspinal in sidelying - DS L breaking bread stretch/ STM L lumbar paraspinal in sidelying - DS  L breaking bread stretch/ STM L lumbar paraspinal in sidelying    Hip distraction with belt       DS [employer]     Cupping lumbar parapsinals             Bilat Hip flexor STM   AFB          BLE LAD    DS [employer] AF[employer] L - DS [employer]     Neuro Re-Ed             TrA   5"x20          TrA + ball squeeze   3"x10                                                              pball        Fwd/diag  5"x10 ea                                                           Ther Ex             VG for LE strength and ROM L5 10' with TENS L7 12' with TENS L7 10' w/ TENS L7 10' w/ TENS L7 10' nv L7 12' L7 10' L7 10' L7 10'7   key pose stretch nv 10"x10  10"x10 10"x10 ea  C, R 10"x10 ea  C, R 10"x10 10"x10     Man hip flexor stretch  5' ea held held held held       Prone hip flexor stretch with strap  10"x10 ea held held held held       [facility]   5"x10 ea man Man 5"x10 ea +DKTC  Man 10"x6 ea man man     Hams stretch    man Man 30"x3 ea Man 29"x4 ea man man                                            Ther Activity                                       Gait Training                                       Modalities             TENs unit    Pt edu and delivery

## 2023-10-27 ENCOUNTER — APPOINTMENT (OUTPATIENT)
Dept: PHYSICAL THERAPY | Facility: REHABILITATION | Age: 73
End: 2023-10-27
Payer: COMMERCIAL

## 2023-11-01 ENCOUNTER — OFFICE VISIT (OUTPATIENT)
Dept: PHYSICAL THERAPY | Facility: REHABILITATION | Age: 73
End: 2023-11-01
Payer: COMMERCIAL

## 2023-11-01 DIAGNOSIS — M47.816 LUMBAR SPONDYLOSIS: ICD-10-CM

## 2023-11-01 DIAGNOSIS — M54.16 LUMBAR RADICULOPATHY: Primary | ICD-10-CM

## 2023-11-01 PROCEDURE — 97140 MANUAL THERAPY 1/> REGIONS: CPT

## 2023-11-01 PROCEDURE — 97110 THERAPEUTIC EXERCISES: CPT

## 2023-11-01 NOTE — PROGRESS NOTES
Daily Note     Today's date: 2023  Patient name: Yogi Coello  : 1950  MRN: [id number]  Referring provider: Natty Escalona MD  Dx:   Encounter Diagnosis     ICD-10-CM    1. Lumbar radiculopathy  M54.16       2. Lumbar spondylosis  M47.816                      Subjective: Pt reports he has been doing ok. No new complaints upon arrival to physical therapy today. Objective: See treatment diary below      Assessment: Tolerated treatment well. Continues to respond well to manual stretches/STM. Hamstring flexibility has improved significantly since beginning physical therapy. Patient would benefit from continued PT to further manage pain/symptoms of lumbar spine and maximize overall function with ADL's. Plan: Continue per plan of care.       Precautions: HTN, hx of back surgery    FOTO   = 49/57        Manuals 11/1 9/20 9/22 9/29 10/4 10/6 10/11 10/18 10/20 10/25   S/l lumbar flexion mob L breaking bread stretch/ STM L lumbar paraspinal in sidelying - AFB   L breaking bread stretch/ STM L lumbar paraspinal in sidelying - DS L breaking bread stretch/ STM L lumbar paraspinal in sidelying - AF[employer] L breaking bread stretch/ STM L lumbar paraspinal in sidelying - AFB L breaking bread stretch/ STM L lumbar paraspinal in sidelying - DS L breaking bread stretch/ STM L lumbar paraspinal in sidelying - AFB  L breaking bread stretch/ STM L lumbar paraspinal in sidelying    Hip distraction with belt [employer]      DS [employer]     Cupping lumbar parapsinals             Bilat Hip flexor STM   AFB          BLE LAD [employer]   DS [employer] AFB L - DS [employer]     Neuro Re-Ed             TrA   5"x20          TrA + ball squeeze   3"x10                                                              pball        Fwd/diag  5"x10 ea                                                           Ther Ex             VG for LE strength and ROM L7 10'  L7 12' with TENS L7 10' w/ TENS L7 10' w/ TENS L7 10' nv L7 12' L7 10' L7 10' L7 10'7   key pose stretch  10"x10  10"x10 10"x10 ea  C, R 10"x10 ea  C, R 10"x10 10"x10     Man hip flexor stretch  5' ea held held held held       Prone hip flexor stretch with strap  10"x10 ea held held held held       [facility]   5"x10 ea man Man 5"x10 ea +DKTC  Man 10"x6 ea man man     Hams stretch man   man Man 30"x3 ea Man 29"x4 ea man man                                            Ther Activity                                       Gait Training                                       Modalities             TENs unit    Pt edu and delivery

## 2023-11-07 LAB
ALBUMIN SERPL-MCNC: 4.5 G/DL (ref 3.6–5.1)
ALBUMIN/GLOB SERPL: 2 (CALC) (ref 1–2.5)
ALP SERPL-CCNC: 65 U/L (ref 35–144)
ALT SERPL-CCNC: 28 U/L (ref 9–46)
AST SERPL-CCNC: 22 U/L (ref 10–35)
BASOPHILS # BLD AUTO: 31 CELLS/UL (ref 0–200)
BASOPHILS NFR BLD AUTO: 0.9 %
BILIRUB SERPL-MCNC: 0.8 MG/DL (ref 0.2–1.2)
BUN SERPL-MCNC: 15 MG/DL (ref 7–25)
BUN/CREAT SERPL: ABNORMAL (CALC) (ref 6–22)
CALCIUM SERPL-MCNC: 9.5 MG/DL (ref 8.6–10.3)
CHLORIDE SERPL-SCNC: 103 MMOL/L (ref 98–110)
CHOLEST SERPL-MCNC: 111 MG/DL
CHOLEST/HDLC SERPL: 2.3 (CALC)
CO2 SERPL-SCNC: 30 MMOL/L (ref 20–32)
CREAT SERPL-MCNC: 0.85 MG/DL (ref 0.7–1.28)
EOSINOPHIL # BLD AUTO: 31 CELLS/UL (ref 15–500)
EOSINOPHIL NFR BLD AUTO: 0.9 %
ERYTHROCYTE [DISTWIDTH] IN BLOOD BY AUTOMATED COUNT: 13.6 % (ref 11–15)
GFR/BSA.PRED SERPLBLD CYS-BASED-ARV: 91 ML/MIN/1.73M2
GLOBULIN SER CALC-MCNC: 2.2 G/DL (CALC) (ref 1.9–3.7)
GLUCOSE SERPL-MCNC: 136 MG/DL (ref 65–99)
HBA1C MFR BLD: 6.1 % OF TOTAL HGB
HCT VFR BLD AUTO: 35.1 % (ref 38.5–50)
HDLC SERPL-MCNC: 49 MG/DL
HGB BLD-MCNC: 11.8 G/DL (ref 13.2–17.1)
LDLC SERPL CALC-MCNC: 48 MG/DL (CALC)
LYMPHOCYTES # BLD AUTO: 1374 CELLS/UL (ref 850–3900)
LYMPHOCYTES NFR BLD AUTO: 40.4 %
MCH RBC QN AUTO: 31.6 PG (ref 27–33)
MCHC RBC AUTO-ENTMCNC: 33.6 G/DL (ref 32–36)
MCV RBC AUTO: 94.1 FL (ref 80–100)
MONOCYTES # BLD AUTO: 381 CELLS/UL (ref 200–950)
MONOCYTES NFR BLD AUTO: 11.2 %
NEUTROPHILS # BLD AUTO: 1584 CELLS/UL (ref 1500–7800)
NEUTROPHILS NFR BLD AUTO: 46.6 %
NONHDLC SERPL-MCNC: 62 MG/DL (CALC)
PLATELET # BLD AUTO: 139 THOUSAND/UL (ref 140–400)
PMV BLD REES-ECKER: 10.7 FL (ref 7.5–12.5)
POTASSIUM SERPL-SCNC: 4 MMOL/L (ref 3.5–5.3)
PROT SERPL-MCNC: 6.7 G/DL (ref 6.1–8.1)
RBC # BLD AUTO: 3.73 MILLION/UL (ref 4.2–5.8)
SODIUM SERPL-SCNC: 140 MMOL/L (ref 135–146)
TRIGL SERPL-MCNC: 60 MG/DL
TSH SERPL-ACNC: 1.93 MIU/L (ref 0.4–4.5)
WBC # BLD AUTO: 3.4 THOUSAND/UL (ref 3.8–10.8)

## 2023-11-08 ENCOUNTER — APPOINTMENT (OUTPATIENT)
Dept: PHYSICAL THERAPY | Facility: REHABILITATION | Age: 73
End: 2023-11-08
Payer: COMMERCIAL

## 2023-11-10 ENCOUNTER — EVALUATION (OUTPATIENT)
Dept: PHYSICAL THERAPY | Facility: REHABILITATION | Age: 73
End: 2023-11-10
Payer: COMMERCIAL

## 2023-11-10 DIAGNOSIS — M47.816 LUMBAR SPONDYLOSIS: ICD-10-CM

## 2023-11-10 DIAGNOSIS — M54.16 LUMBAR RADICULOPATHY: Primary | ICD-10-CM

## 2023-11-10 PROCEDURE — 97140 MANUAL THERAPY 1/> REGIONS: CPT | Performed by: PHYSICAL MEDICINE & REHABILITATION

## 2023-11-10 PROCEDURE — 97110 THERAPEUTIC EXERCISES: CPT | Performed by: PHYSICAL MEDICINE & REHABILITATION

## 2023-11-10 NOTE — PROGRESS NOTES
PT Re-Evaluation     Today's date: 11/10/2023  Patient name: Riaz Trna  : 1950  MRN: [id number]  Referring provider: Meenakshi Meyer MD  Dx:   Encounter Diagnosis     ICD-10-CM    1. Lumbar radiculopathy  M54.16       2. Lumbar spondylosis  M47.816                      Assessment  Assessment details: Patient has participated consistently in skilled therapy and HEP performance since evaluation and today demonstrates improvements in objective measures. Despite improvements patient activity tolerance remains limited secondary to pain. Current symptoms continue to correlate with previous MRI findings. Given perceived and objective improvement with current course, patient agreeable to continuation of skilled intervention to address remaining deficits. Impairments: abnormal gait, abnormal or restricted ROM, activity intolerance, impaired physical strength, lacks appropriate home exercise program and pain with function    Symptom irritability: highBarriers to therapy: None   Understanding of Dx/Px/POC: good   Prognosis: good    Goals  Short term goals: to be met in 3-4 weeks  Pt independent with initial HEP, rationale, technique and frequency, for ROM and pain control.- MET  Pt will report at least a 25% reduction in subjective pain complaints/symptoms to better manage ADLs and household chores. - Progressing  Improve max pain level by 2 points on the numeric pain rating scale indicating a clinically significant reduction in pain level. - Progressing  Pt will achieve an improvement in FOTO score indicating an improvement in pain and function. - progressing    Long term goals: to be met in 6-8 weeks  Pt will report a 50% or > reduction in subjective pain complaints/symptoms to better manage ADLs and household chores.   Pt will improve FOTO score further indicating an overall improvement in pain and function   Pt will be able to perform ADLs, iADLS, and work duties with a max pain level of 5/10 for overall reduction in pain. Pt independent with rationale, technique and plan for performance of advanced HEP to ensure independent self-management of symptoms upon discharge. Plan  Patient would benefit from: skilled physical therapy  Planned modality interventions: TENS, thermotherapy: hydrocollator packs, traction, ultrasound, cryotherapy and low level laser therapy  Planned therapy interventions: body mechanics training, therapeutic training, therapeutic exercise, therapeutic activities, stretching, strengthening, neuromuscular re-education, patient education, home exercise program, functional ROM exercises, flexibility, manual therapy, Acevedo taping, joint mobilization and balance  Frequency: 1-2x/week. Duration in weeks: 8  Treatment plan discussed with: patient        Subjective Evaluation    History of Present Illness  Mechanism of injury: 11/10: Patient notes some improvement in symptoms since beginning therapy, continues with increased pain with standing and walking, unable to maintain neutral posture for extended periods of time. Intermittent LE sxs, predominantly on L, over the past month has developed some intermittent numbness in R foot. Weakness through BLEs after extended ambulation. Pt presents for chronic low back pain. Had MRI. Was told L1 is affecting his L2-3 area. Was told he may be a surgical candidate, but pt would like to avoid this if possible if he can manage pain with PT. Had injections June 14th. States the injection helped 1.5 months. Still feels a little better but pain is slowly returning. MRI done 8/8/22: Spondylotic degenerative changes are seen diffusely most prominently at L2-3 where there is severe central and bilateral lateral recess stenosis as well as moderate to severe left foraminal narrowing. Thecal sac is compressed with nerve root stretched and redundant above this level. Correlate for left L2 radiculopathy.  Spondylotic degenerative changes are seen at remaining levels including L3-4 where there is moderate to severe central and lateral recess stenosis. Currently having pain in the low back. States he cannot stand straight. Walking is difficult and feels he needs to bend forward. Denies pain into the legs lately. Does feel pins and needles and shock in the big toe on the R at times when taking a hot shower. Feels this is due to the hot water. Also has difficulty sleeping. Denies any additional n/t. Does feel the LLE is weaker. Denies difficulty urinating.    Patient Goals  Patient goals for therapy: decreased pain, return to sport/leisure activities, return to work, independence with ADLs/IADLs, increased strength and increased motion  Patient goal: relieve pain  Pain  Current pain rating: 3  At best pain ratin  At worst pain ratin  Location: low back and into B groins and thighs  Quality: burning, radiating and sharp (electrical shock)  Aggravating factors: walking and standing  Progression: worsening    Social Support  Lives with: spouse    Employment status: working (professor)    Diagnostic Tests  MRI studies: abnormal  Treatments  Previous treatment: physical therapy and medication      Objective    Objective: See treatment diary below    Lumbar AROM  Lumbar flex: hands to floor, reduces pain   Lumbar ext: unable to ext, increases pain  Lumbar R rotation WNL  Lumbar L rotation WNL     Lower quarter screen  LE reflexes   - Patellar and achilles absent bilat    - Neg clonus BLE   - LE sensation: dec LLE    (-) slump BLE  (-) neg  SLR BLE- at RE SLR increased L LBP  (+) ERICA L hip with reproduction of LBP- (-) at RE  (-) FADIR bilat  (-) ERICA R hip  LE MMT grossly 5/5 throughout, except L hip flexion 4/5    MMT  Hip ext  R = 4- with LBP  L = 4-     Hip abd  R = 4+  L = 4+     Mobility testing  TTP L4-5 with increased mobility  Hypomobility throughout thoracic spine with PA mobility testing  Increased tone lumbar paraspinals  L>R with TTP Gait:  Ambulates with flexed hips/slight forward trunk flexion- continued at RE, more mild in AM     Precautions: HTN, hx of back surgery    FOTO  9/12 = 49/57    Manuals 11/1 11/10  9/29 10/4 10/6 10/11 10/18 10/20 10/25   S/l lumbar flexion mob L breaking bread stretch/ STM L lumbar paraspinal in sidelying - AFB LH, also QL release  L breaking bread stretch/ STM L lumbar paraspinal in sidelying - DS L breaking bread stretch/ STM L lumbar paraspinal in sidelying - AFB L breaking bread stretch/ STM L lumbar paraspinal in sidelying - AFB L breaking bread stretch/ STM L lumbar paraspinal in sidelying - DS L breaking bread stretch/ STM L lumbar paraspinal in sidelying - AFB  L breaking bread stretch/ STM L lumbar paraspinal in sidelying    Hip distraction with belt AFB      DS [employer]B     Cupping lumbar parapsinals             Bilat Hip flexor STM  RE, LH           BLE LAD AFB LH  DS [employer] AFB L - DS AFB     Neuro Re-Ed             TrA             TrA + ball squeeze                                                                 pball        Fwd/diag  5"x10 ea                                                           Ther Ex  11/10           VG for LE strength and ROM L7 10'  L7, 10'  L7 10' w/ TENS L7 10' nv L7 12' L7 10' L7 10' L7 10'7   key pose stretch    10"x10 10"x10 ea  C, R 10"x10 ea  C, R 10"x10 10"x10     Man hip flexor stretch    held held held       Prone hip flexor stretch with strap    held held held       [facility]    man Man 5"x10 ea +DKTC  Man 10"x6 ea man man     Hams stretch man Manual  man Man 29"x4 ea Man 29"x4 ea man man                                            Ther Activity                                       Gait Training                                       Modalities             TENs unit    Pt edu and delivery

## 2023-11-14 ENCOUNTER — OFFICE VISIT (OUTPATIENT)
Dept: PHYSICAL THERAPY | Facility: REHABILITATION | Age: 73
End: 2023-11-14
Payer: COMMERCIAL

## 2023-11-14 DIAGNOSIS — M47.816 LUMBAR SPONDYLOSIS: ICD-10-CM

## 2023-11-14 DIAGNOSIS — M54.16 LUMBAR RADICULOPATHY: Primary | ICD-10-CM

## 2023-11-14 PROCEDURE — 97110 THERAPEUTIC EXERCISES: CPT

## 2023-11-14 PROCEDURE — 97140 MANUAL THERAPY 1/> REGIONS: CPT

## 2023-11-14 NOTE — PROGRESS NOTES
Daily Note     Today's date: 2023  Patient name: Heather Wells  : 1950  MRN: [id number]  Referring provider: Bing Pittman MD  Dx:   Encounter Diagnosis     ICD-10-CM    1. Lumbar radiculopathy  M54.16       2. Lumbar spondylosis  M47.816           Start Time: 0800  Stop Time: 0845  Total time in clinic (min): 45 minutes    Subjective: Pt reports that he continues to have good days and bad days. He reports that he his having some pain today in his left lower back and slight numbness and pain in his lateral left knee. Objective: See treatment diary below      Assessment: PT tolerated treatment well. Pt demonstrated significant tightness in lumbar paraspinals, hip flexors, and hamstrings which released slightly with STM and stretching. Added standing lateral bend stretch to pt's HEP to continue to work on decreasing tightness in lumbar paraspinals. Pt reported that his pain had decreased a little by the end of the session, and that the numbness in his L knee had decreased a little. Patient would benefit from continued PT. Plan: Continue per plan of care.       Precautions: HTN, hx of back surgery    FOTO   = 49/57    Manuals 11/1 11/10 11/14 9/29 10/4 10/6 10/11 10/18 10/20 10/25   S/l lumbar flexion mob L breaking bread stretch/ STM L lumbar paraspinal in sidelying - AF[employer] LH, also QL release L breaking bread stretch/ STM L lumbar paraspinal in sidelying - JY L breaking bread stretch/ STM L lumbar paraspinal in sidelying - DS L breaking bread stretch/ STM L lumbar paraspinal in sidelying - AFB L breaking bread stretch/ STM L lumbar paraspinal in sidelying - AFB L breaking bread stretch/ STM L lumbar paraspinal in sidelying - DS L breaking bread stretch/ STM L lumbar paraspinal in sidelying - AFB  L breaking bread stretch/ STM L lumbar paraspinal in sidelying    Hip distraction with belt AFB      DS [employer]B     Cupping lumbar parapsinals             Bilat Hip flexor STM  RE, LH JY BLE LAD AFB LH  DS [employer] AFB L - DS [employer]     Neuro Re-Ed             TrA             TrA + ball squeeze                                                                 pball        Fwd/diag  5"x10 ea                                                           Ther Ex  11/10           VG for LE strength and ROM L7 10'  L7, 10' L7, 10' L7 10' w/ TENS L7 10' nv L7 12' L7 10' L7 10' L7 10'7   key pose stretch    10"x10 10"x10 ea  C, R 10"x10 ea  C, R 10"x10 10"x10     Man hip flexor stretch    held held held       Prone hip flexor stretch with strap    held held held       [facility]    man Man 5"x10 ea +DKTC  Man 10"x6 ea man man     Hams stretch man Manual Manual man Man 30"x3 ea Man 29"x4 ea man man     Standing lateral bend stretch   4x30" avni.                                     Ther Activity                                       Gait Training                                       Modalities             TENs unit    Pt edu and delivery

## 2023-11-15 ENCOUNTER — APPOINTMENT (OUTPATIENT)
Dept: PHYSICAL THERAPY | Facility: REHABILITATION | Age: 73
End: 2023-11-15
Payer: COMMERCIAL

## 2023-11-15 DIAGNOSIS — D57.3 SICKLE CELL TRAIT (HCC): Primary | ICD-10-CM

## 2023-11-15 DIAGNOSIS — D64.9 ANEMIA, UNSPECIFIED TYPE: ICD-10-CM

## 2023-11-17 ENCOUNTER — APPOINTMENT (OUTPATIENT)
Dept: PHYSICAL THERAPY | Facility: REHABILITATION | Age: 73
End: 2023-11-17
Payer: COMMERCIAL

## 2023-11-20 ENCOUNTER — OFFICE VISIT (OUTPATIENT)
Dept: PHYSICAL THERAPY | Facility: REHABILITATION | Age: 73
End: 2023-11-20
Payer: COMMERCIAL

## 2023-11-20 DIAGNOSIS — M47.816 LUMBAR SPONDYLOSIS: ICD-10-CM

## 2023-11-20 DIAGNOSIS — M54.16 LUMBAR RADICULOPATHY: Primary | ICD-10-CM

## 2023-11-20 PROCEDURE — 97110 THERAPEUTIC EXERCISES: CPT | Performed by: PHYSICAL MEDICINE & REHABILITATION

## 2023-11-20 PROCEDURE — 97112 NEUROMUSCULAR REEDUCATION: CPT | Performed by: PHYSICAL MEDICINE & REHABILITATION

## 2023-11-20 PROCEDURE — 97140 MANUAL THERAPY 1/> REGIONS: CPT | Performed by: PHYSICAL MEDICINE & REHABILITATION

## 2023-11-20 NOTE — PROGRESS NOTES
Daily Note     Today's date: 2023  Patient name: Raz Hills  : 1950  MRN: [id number]  Referring provider: Matilda Gold MD  Dx:   Encounter Diagnosis     ICD-10-CM    1. Lumbar radiculopathy  M54.16       2. Lumbar spondylosis  M47.816                      Subjective: Patient offers no new complaints to begin session. No change in sxs. Objective: See treatment diary below    Assessment: PT tolerated treatment well. Continues to have improvement in sxs end of session. Patient would benefit from continued PT. Plan: Continue per plan of care.       Precautions: HTN, hx of back surgery    FOTO   = 49/57    Manuals 11/1 11/10 11/14 11/20  10/6 10/11 10/18 10/20 10/25   S/l lumbar flexion mob L breaking bread stretch/ STM L lumbar paraspinal in sidelying - AF[employer] LH, also QL release L breaking bread stretch/ STM L lumbar paraspinal in sidelying - JY LH  L breaking bread stretch/ STM L lumbar paraspinal in sidelying - AFB L breaking bread stretch/ STM L lumbar paraspinal in sidelying - DS L breaking bread stretch/ STM L lumbar paraspinal in sidelying - AFB  L breaking bread stretch/ STM L lumbar paraspinal in sidelying    Hip distraction with belt AFB      DS [employer]     Cupping lumbar parapsinals             Bilat Hip flexor STM  RE, LH JY          BLE LAD AF[employer] LH  LH  AFB L - DS [employer]B     Neuro Re-Ed             TrA             TrA + ball squeeze                                                                 pball        Fwd/diag  5"x10 ea                                                           Ther Ex  11/10  11/20         VG for LE strength and ROM L7 10'  L7, 10' L7, 10' L7, 10'  nv L7 12' L7 10' L7 10' L7 10'7   key pose stretch      10"x10 ea  C, R 10"x10 10"x10     Man hip flexor stretch      held       Prone hip flexor stretch with strap      held       [facility]      +DKTC  Man 5"x10 ea man man     Hams stretch man Manual Manual manual  Man 29"x4 ea man man     Standing lateral bend stretch   4x30" avni.  4x30" ea             Pball roll out to R, 10x5"                      Ther Activity                                       Gait Training                                       Modalities             TENs unit

## 2023-11-29 ENCOUNTER — OFFICE VISIT (OUTPATIENT)
Dept: PHYSICAL THERAPY | Facility: REHABILITATION | Age: 73
End: 2023-11-29
Payer: COMMERCIAL

## 2023-11-29 DIAGNOSIS — M54.16 LUMBAR RADICULOPATHY: Primary | ICD-10-CM

## 2023-11-29 DIAGNOSIS — M47.816 LUMBAR SPONDYLOSIS: ICD-10-CM

## 2023-11-29 PROCEDURE — 97110 THERAPEUTIC EXERCISES: CPT

## 2023-11-29 PROCEDURE — 97140 MANUAL THERAPY 1/> REGIONS: CPT

## 2023-11-29 NOTE — PROGRESS NOTES
Daily Note     Today's date: 2023  Patient name: Riley Junior  : 1950  MRN: [id number]  Referring provider: Cayla Khanna MD  Dx:   Encounter Diagnosis     ICD-10-CM    1. Lumbar radiculopathy  M54.16       2. 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 Objective: See treatment diary below    Assessment: PT tolerated treatment well. Pt's R paraspinals and quadratus lumborum were much tighter upon palpation today than his L side, which improved slightly with stretching and STM. Performed self standing hamstring stretch to decrease hamstring tightness today because pt reported he felt pulling in lower back with hamstring stretch bilaterally. Added this exercise to HEP. Pt reported that the pain in his R lower back did not really change from the beginning to the end of his session today. Patient would benefit from continued PT. Plan: Continue per plan of care.       Precautions: HTN, hx of back surgery    FOTO   = 49/57    Manuals 11/1 11/10 11/14 11/20 11/29 10/6 10/11 10/18 10/20 10/25   S/l lumbar flexion mob L breaking bread stretch/ STM L lumbar paraspinal in sidelying - AF[employer] LH, also QL release L breaking bread stretch/ STM L lumbar paraspinal in sidelying - JY LH R breaking bread stretch/ STM R lumbar paraspinal in sidelying - JY L breaking bread stretch/ STM L lumbar paraspinal in sidelying - AFB L breaking bread stretch/ STM L lumbar paraspinal in sidelying - DS L breaking bread stretch/ STM L lumbar paraspinal in sidelying - AFB  L breaking bread stretch/ STM L lumbar paraspinal in sidelying    Hip distraction with belt AFB      DS [employer]B     Cupping lumbar parapsinals             Bilat Hip flexor STM  RE, LH JY  JY L side only        BLE LAD AFB LH  LH  AFB L - DS [employer]B     Neuro Re-Ed    11/20         TrA             TrA + ball squeeze                                                                 pball        Fwd/diag  5"x10 ea                                                           Ther Ex  11/10  11/20         VG for LE strength and ROM L7 10'  L7, 10' L7, 10' L7, 10' L7, 10' nv L7 12' L7 10' L7 10' L7 10'7   key pose stretch      10"x10 ea  C, R 10"x10 10"x10     Man hip flexor stretch      held       Prone hip flexor stretch with strap      held       [facility]      +DKTC  Man 5"x10 ea man man     Hams stretch man Manual Manual manual manual Man 30"x3 ea man man     Standing lateral bend stretch   4x30" avni.  4x30" ea             Pball roll out to R, 10x5" Pball roll out to L, 10x5"        Standing hamstring stretch     3x30" ea        Ther Activity                                       Gait Training                                       Modalities             TENs unit

## 2023-12-04 ENCOUNTER — OFFICE VISIT (OUTPATIENT)
Dept: PHYSICAL THERAPY | Facility: REHABILITATION | Age: 73
End: 2023-12-04
Payer: COMMERCIAL

## 2023-12-04 DIAGNOSIS — M47.816 LUMBAR SPONDYLOSIS: ICD-10-CM

## 2023-12-04 DIAGNOSIS — E66.9 DIABETES MELLITUS TYPE 2 IN OBESE: Chronic | ICD-10-CM

## 2023-12-04 DIAGNOSIS — M54.16 LUMBAR RADICULOPATHY: Primary | ICD-10-CM

## 2023-12-04 DIAGNOSIS — E11.69 DIABETES MELLITUS TYPE 2 IN OBESE: Chronic | ICD-10-CM

## 2023-12-04 PROCEDURE — 97110 THERAPEUTIC EXERCISES: CPT

## 2023-12-04 PROCEDURE — 97140 MANUAL THERAPY 1/> REGIONS: CPT

## 2023-12-04 RX ORDER — METFORMIN HYDROCHLORIDE 500 MG/1
TABLET, EXTENDED RELEASE ORAL
Qty: 180 TABLET | Refills: 1 | Status: SHIPPED | OUTPATIENT
Start: 2023-12-04

## 2023-12-04 NOTE — PROGRESS NOTES
Daily Note     Today's date: 2023  Patient name: Vaishali Friday  : 1950  MRN: [id number]  Referring provider: Orquidea Schmitt MD  Dx:   Encounter Diagnosis     ICD-10-CM    1. Lumbar radiculopathy  M54.16       2. Lumbar spondylosis  M47.816           Start Time: 0734  Stop Time: 0820  Total time in clinic (min): 46 minutes    Subjective: Pt reports continued LB pain, though R LB has been better compared to LV. Objective: See treatment diary below      Assessment: Tolerated treatment well. Continued with program as outlined below. Moderate soft tissue restriction and TTP along bilateral QL today. Slight improvement in symptoms post treatment, but still has some discomfort centered along lumbar spine when standing upright. Patient would benefit from continued PT to further reduce these restrictions, decrease pain, and maximize overall function with ADL's. Plan: Continue per plan of care.       Precautions: HTN, hx of back surgery    FOTO   = 49/57    Manuals 11/1 11/10 11/14 11/20 11/29 12/4  10/18 10/20 10/25   S/l lumbar flexion mob L breaking bread stretch/ STM L lumbar paraspinal in sidelying - AFB LH, also QL release L breaking bread stretch/ STM L lumbar paraspinal in sidelying - JY LH R breaking bread stretch/ STM R lumbar paraspinal in sidelying - JY Bilat  breaking bread stretch/ STM R lumbar paraspinal in sidelying - AFB  L breaking bread stretch/ STM L lumbar paraspinal in sidelying - AFB  L breaking bread stretch/ STM L lumbar paraspinal in sidelying    Hip distraction with belt AFB       AFB     Cupping lumbar parapsinals             Bilat Hip flexor STM  RE, LH JY  JY L side only        BLE LAD AFB LH  LH    AFB     Neuro Re-Ed             TrA             TrA + ball squeeze                                                                 pball                                                                   Ther Ex  11/10  11/20         VG for LE strength and ROM L7 10'  L7, 10' L7, 10' L7, 10' L7, 10' L7, 10'  L7 10' L7 10' L7 10'7   key pose stretch        10"x10     Man hip flexor stretch             Prone hip flexor stretch with strap               SKTC        man     Hams stretch man Manual Manual manual manual manual  man     Standing lateral bend stretch   4x30" avni.  4x30" ea             Pball roll out to R, 10x5" Pball roll out to L, 10x5" Pball roll out 3-way  10x5" ea       Standing hamstring stretch     3x30" ea        LTR      5"x15 ea       Ther Activity                                       Gait Training                                       Modalities             TENs unit

## 2023-12-06 ENCOUNTER — OFFICE VISIT (OUTPATIENT)
Dept: PHYSICAL THERAPY | Facility: REHABILITATION | Age: 73
End: 2023-12-06
Payer: COMMERCIAL

## 2023-12-06 DIAGNOSIS — M47.816 LUMBAR SPONDYLOSIS: ICD-10-CM

## 2023-12-06 DIAGNOSIS — M54.16 LUMBAR RADICULOPATHY: Primary | ICD-10-CM

## 2023-12-06 PROCEDURE — 97140 MANUAL THERAPY 1/> REGIONS: CPT | Performed by: PHYSICAL THERAPIST

## 2023-12-06 NOTE — PROGRESS NOTES
Daily Note     Today's date: 2023  Patient name: Viktoriya Burr  : 1950  MRN: [id number]  Referring provider: Luis Enrique Ortega MD  Dx:   Encounter Diagnosis     ICD-10-CM    1. Lumbar radiculopathy  M54.16       2. Lumbar spondylosis  M47.816           Start Time: 0848  Stop Time: 09  Total time in clinic (min): 32 minutes    Subjective: Pt offers no new complaints. States he is getting a lumbar injection Monday. Feels therapy is helping managing his sx's, especially the breaking bread stretch. Objective: See treatment diary below  Shortened session per pt request as pt needed to leave early for work. Assessment: Tolerated treatment well. Pt continues to respond well to manuals with reduction in pain and does well with flexion mobs/stretching. Pt will benefit from continued skilled outpatient PT to improve tolerance to activity, to address therapy goals, to reduce pain, and improve function. Plan: Continue per plan of care.       Precautions: HTN, hx of back surgery        Manuals 11/1 11/10 11/14 11/20 11/29 12/4 12/6  10/20 10/25   S/l lumbar flexion mob L breaking bread stretch/ STM L lumbar paraspinal in sidelying - AFB LH, also QL release L breaking bread stretch/ STM L lumbar paraspinal in sidelying - JY LH R breaking bread stretch/ STM R lumbar paraspinal in sidelying - JY Bilat  breaking bread stretch/ STM R lumbar paraspinal in sidelying - AFB Bilat  breaking bread stretch/ STM lumbar paraspinals in sidelying - DS   L breaking bread stretch/ STM L lumbar paraspinal in sidelying    Hip distraction with belt AFB            Lumbar flexion mobs       DS - bilat      Bilat Hip flexor STM  RE, LH JY  JY L side only        BLE LAD AFB LH  LH         Neuro Re-Ed             TrA             TrA + ball squeeze                                                                 pball                                                                   Ther Ex  11/10  11/20 VG for LE strength and ROM L7 10'  L7, 10' L7, 10' L7, 10' L7, 10' L7, 10' L 8'  L7 10' L7 10'7   key pose stretch             Man hip flexor stretch             Prone hip flexor stretch with strap               SKTC             Hams stretch man Manual Manual manual manual manual       Standing lateral bend stretch   4x30" avni.  4x30" ea             Pball roll out to R, 10x5" Pball roll out to L, 10x5" Pball roll out 3-way  10x5" ea       Standing hamstring stretch     3x30" ea        LTR      5"x15 ea       Ther Activity                                       Gait Training                                       Modalities             TENs unit

## 2023-12-08 ENCOUNTER — OFFICE VISIT (OUTPATIENT)
Dept: FAMILY MEDICINE CLINIC | Facility: CLINIC | Age: 73
End: 2023-12-08
Payer: COMMERCIAL

## 2023-12-08 VITALS
DIASTOLIC BLOOD PRESSURE: 70 MMHG | SYSTOLIC BLOOD PRESSURE: 120 MMHG | OXYGEN SATURATION: 96 % | TEMPERATURE: 98 F | HEART RATE: 60 BPM | BODY MASS INDEX: 31.02 KG/M2 | RESPIRATION RATE: 16 BRPM | HEIGHT: 66 IN | WEIGHT: 193 LBS

## 2023-12-08 DIAGNOSIS — B34.9 VIRAL SYNDROME: Primary | ICD-10-CM

## 2023-12-08 LAB
SARS-COV-2 AG UPPER RESP QL IA: NEGATIVE
SL AMB POCT RAPID FLU A: NEGATIVE
SL AMB POCT RAPID FLU B: NEGATIVE
VALID CONTROL: NORMAL

## 2023-12-08 PROCEDURE — 99213 OFFICE O/P EST LOW 20 MIN: CPT | Performed by: FAMILY MEDICINE

## 2023-12-08 PROCEDURE — 87804 INFLUENZA ASSAY W/OPTIC: CPT | Performed by: FAMILY MEDICINE

## 2023-12-08 PROCEDURE — 87811 SARS-COV-2 COVID19 W/OPTIC: CPT | Performed by: FAMILY MEDICINE

## 2023-12-08 RX ORDER — BENZONATATE 200 MG/1
200 CAPSULE ORAL 3 TIMES DAILY PRN
Qty: 20 CAPSULE | Refills: 0 | Status: SHIPPED | OUTPATIENT
Start: 2023-12-08

## 2023-12-08 NOTE — PROGRESS NOTES
Name: Wne Lambert      : 1950      MRN: [id number]  Encounter Provider: Rosalba Cuellar DO  Encounter Date: 2023   Encounter department: [address] [place] [address]. Viral syndrome  Assessment & Plan:  Cough and congestion for three days. COVID and Flu negative in office today. Continue symptom directed treatment, will add Tessalon TID prn. Supportive care otherwise. Follow up in one week if not improved. Orders:  -     POCT Rapid Covid Ag  -     POCT rapid flu A and B  -     benzonatate (TESSALON) 200 MG capsule; Take 1 capsule (200 mg total) by mouth 3 (three) times a day as needed for cough         Subjective      HPI  2-3 days ago started with cough, started advil cough medicine. Has some chest pressure with coughing. Yesterday had fever and myalgias. Also with congestion and sore throat, rhinorrhea. Was unable to sleep last night due to cough. Review of Systems    Current Outpatient Medications on File Prior to Visit   Medication Sig   • amLODIPine (NORVASC) 10 mg tablet Take 1 tablet (10 mg total) by mouth daily   • aspirin 81 mg chewable tablet Chew 1 tablet daily   • Contour Next Test test strip TEST 3 TIMES DAILY   • fluticasone-umeclidinium-vilanterol 100-62.5-25 mcg/actuation inhaler Inhale 1 puff daily Rinse mouth after use.    • losartan (COZAAR) 100 MG tablet Take 1 tablet (100 mg total) by mouth daily   • metFORMIN (GLUCOPHAGE-XR) 500 mg 24 hr tablet TAKE 1 TAB DAILY FOR 2 WEEKS, THEN TAKE 2 TABS ONCE A DAY WITH FOOD   • Microlet Lancets MISC TEST 3 TIMES DAILY   • naproxen (NAPROSYN) 500 mg tablet Take 1 tablet (500 mg total) by mouth 2 (two) times a day as needed for mild pain   • rosuvastatin (CRESTOR) 10 MG tablet Take 1 tablet (10 mg total) by mouth daily   • chlorthalidone 25 mg tablet Take 0.5 tablets (12.5 mg total) by mouth daily       Objective     /70   Pulse 60   Temp 98 °F (36.7 °C) (Temporal)   Resp 16   Ht 5' 6" (1.676 m) Wt 87.5 kg (193 lb)   SpO2 96%   BMI 31.15 kg/m²     Physical Exam  Vitals reviewed. Constitutional:       Appearance: Normal appearance. HENT:      Head: Normocephalic and atraumatic. Right Ear: External ear normal.      Left Ear: External ear normal.      Nose: Congestion present. Mouth/Throat:      Mouth: Mucous membranes are moist.      Pharynx: Oropharynx is clear. Eyes:      Extraocular Movements: Extraocular movements intact. Conjunctiva/sclera: Conjunctivae normal.   Cardiovascular:      Rate and Rhythm: Normal rate and regular rhythm. Heart sounds: Normal heart sounds. Pulmonary:      Effort: Pulmonary effort is normal.      Breath sounds: Normal breath sounds. Skin:     General: Skin is warm and dry. Neurological:      Mental Status: He is alert.    Psychiatric:         Mood and Affect: Mood normal.         Behavior: Behavior normal.       Mae Mayen,

## 2023-12-11 ENCOUNTER — APPOINTMENT (OUTPATIENT)
Dept: PHYSICAL THERAPY | Facility: REHABILITATION | Age: 73
End: 2023-12-11
Payer: COMMERCIAL

## 2023-12-13 ENCOUNTER — OFFICE VISIT (OUTPATIENT)
Dept: FAMILY MEDICINE CLINIC | Facility: CLINIC | Age: 73
End: 2023-12-13
Payer: COMMERCIAL

## 2023-12-13 ENCOUNTER — APPOINTMENT (OUTPATIENT)
Dept: PHYSICAL THERAPY | Facility: REHABILITATION | Age: 73
End: 2023-12-13
Payer: COMMERCIAL

## 2023-12-13 VITALS
DIASTOLIC BLOOD PRESSURE: 64 MMHG | SYSTOLIC BLOOD PRESSURE: 130 MMHG | HEART RATE: 70 BPM | WEIGHT: 188 LBS | HEIGHT: 66 IN | RESPIRATION RATE: 16 BRPM | TEMPERATURE: 97.8 F | BODY MASS INDEX: 30.22 KG/M2 | OXYGEN SATURATION: 99 %

## 2023-12-13 DIAGNOSIS — R09.89 SYMPTOMS OF UPPER RESPIRATORY INFECTION (URI): Primary | ICD-10-CM

## 2023-12-13 PROCEDURE — 99213 OFFICE O/P EST LOW 20 MIN: CPT | Performed by: FAMILY MEDICINE

## 2023-12-13 RX ORDER — ALBUTEROL SULFATE 90 UG/1
2 AEROSOL, METERED RESPIRATORY (INHALATION) EVERY 4 HOURS PRN
Qty: 18 G | Refills: 1 | Status: SHIPPED | OUTPATIENT
Start: 2023-12-13

## 2023-12-13 RX ORDER — DOXYCYCLINE HYCLATE 100 MG/1
100 CAPSULE ORAL
Qty: 14 CAPSULE | Refills: 0 | Status: SHIPPED | OUTPATIENT
Start: 2023-12-13 | End: 2023-12-20

## 2023-12-13 NOTE — PROGRESS NOTES
Name: Riley Junior      : 1950      MRN: [id number]  Encounter Provider: Cayla Khanna MD  Encounter Date: 2023   Encounter department: [address] [place] [address]     1. Symptoms of upper respiratory infection (URI)  -     albuterol (PROVENTIL HFA,VENTOLIN HFA) 90 mcg/act inhaler; Inhale 2 puffs every 4 (four) hours as needed for wheezing or shortness of breath (cough)  -     doxycycline hyclate (VIBRAMYCIN) 100 mg capsule; Take 1 capsule (100 mg total) by mouth 2 (two) times daily after meals for 7 days    Going symptoms of cough and bronchospasm. Patient with history of bronchitis. Start doxycycline and PRN albuterol. Patient will add Mucinex OTC. Continue Tessalon Perles as needed. He will contact me in a few days if symptoms are not improving significantly. Subjective     Ongoing URI symptoms  Patient was evaluated by  23, testing for COVID and flu was negative. Started Tessalon Perles 200 mg 3 times daily as needed. He reports that his cough has been worsening. Chest hurts from ongoing cough, symptoms of neck spasms due to above. He feels tired and weak. No chest tightness or shortness of breath per se. Patient reports symptoms of wheezing primarily at night. He reports low-grade fever up to 99 Fahrenheit, takes Tylenol as needed. URI   Associated symptoms include congestion, coughing and wheezing. Review of Systems   Constitutional:  Positive for fatigue and fever. HENT:  Positive for congestion and postnasal drip. Respiratory:  Positive for cough and wheezing. Neurological: Negative.         Past Medical History:   Diagnosis Date   • Acute biliary pancreatitis 10/18/2020   • Anemia     mild anemia   • Back pain    • Heart burn    • History of chest pain    • Hypertension    • Palpitations    • Problems with hearing    • Problems with swallowing    • Sickle cell trait ([address])     last assessed 10/31/14   • Snoring    • SOB (shortness of breath)    • Spinal stenosis     ddd spinal stenosis   • Swelling      Past Surgical History:   Procedure Laterality Date   • BACK SURGERY      lower back   • COLONOSCOPY  01/2016    due 2026, Laura Hernandez  12/07   • LUMBAR EPIDURAL INJECTION  06/14/2023     Family History   Problem Relation Age of Onset   • No Known Problems Mother    • Diabetes Sister    • Sickle cell anemia Family      Social History     Socioeconomic History   • Marital status: /Civil Union     Spouse name: None   • Number of children: None   • Years of education: None   • Highest education level: None   Occupational History   • None   Tobacco Use   • Smoking status: Never   • Smokeless tobacco: Never   Vaping Use   • Vaping status: Never Used   Substance and Sexual Activity   • Alcohol use: No   • Drug use: No   • Sexual activity: None   Other Topics Concern   • None   Social History Narrative   • None     Social Determinants of Health     Financial Resource Strain: Medium Risk (8/24/2023)    Overall Financial Resource Strain (CARDIA)    • Difficulty of Paying Living Expenses: Somewhat hard   Food Insecurity: Not on file   Transportation Needs: No Transportation Needs (8/24/2023)    PRAPARE - Transportation    • Lack of Transportation (Medical): No    • Lack of Transportation (Non-Medical):  No   Physical Activity: Not on file   Stress: Not on file   Social Connections: Not on file   Intimate Partner Violence: Not on file   Housing Stability: Not on file     Current Outpatient Medications on File Prior to Visit   Medication Sig   • amLODIPine (NORVASC) 10 mg tablet Take 1 tablet (10 mg total) by mouth daily   • aspirin 81 mg chewable tablet Chew 1 tablet daily   • benzonatate (TESSALON) 200 MG capsule Take 1 capsule (200 mg total) by mouth 3 (three) times a day as needed for cough   • chlorthalidone 25 mg tablet Take 0.5 tablets (12.5 mg total) by mouth daily   • Contour Next Test test strip TEST 3 TIMES DAILY • fluticasone-umeclidinium-vilanterol 100-62.5-25 mcg/actuation inhaler Inhale 1 puff daily Rinse mouth after use. • losartan (COZAAR) 100 MG tablet Take 1 tablet (100 mg total) by mouth daily   • metFORMIN (GLUCOPHAGE-XR) 500 mg 24 hr tablet TAKE 1 TAB DAILY FOR 2 WEEKS, THEN TAKE 2 TABS ONCE A DAY WITH FOOD   • Microlet Lancets MISC TEST 3 TIMES DAILY   • naproxen (NAPROSYN) 500 mg tablet Take 1 tablet (500 mg total) by mouth 2 (two) times a day as needed for mild pain   • rosuvastatin (CRESTOR) 10 MG tablet Take 1 tablet (10 mg total) by mouth daily     Allergies   Allergen Reactions   • Iodinated Contrast Media Other (See Comments)   • Levofloxacin Rash     Other reaction(s): Unknown Allergic Reaction     Immunization History   Administered Date(s) Administered   • COVID-19 PFIZER VACCINE 0.3 ML IM 02/21/2021, 03/13/2021, 11/19/2021   • INFLUENZA 10/12/2007, 09/25/2009, 01/17/2014, 10/22/2014, 09/20/2018, 11/30/2022, 10/10/2023   • Influenza Split High Dose Preservative Free IM 10/14/2016, 10/27/2017   • Influenza, high dose seasonal 0.7 mL 09/20/2018, 09/10/2019, 09/16/2020   • Influenza, seasonal, injectable 10/12/2007, 09/25/2009, 01/17/2014, 10/22/2014   • Pneumococcal Conjugate 13-Valent 10/14/2016   • Pneumococcal Polysaccharide PPV23 04/26/2013   • Zoster Vaccine Recombinant 08/17/2023       Objective     /64 (BP Location: Left arm, Patient Position: Sitting, Cuff Size: Large)   Pulse 70   Temp 97.8 °F (36.6 °C) (Temporal)   Resp 16   Ht 5' 6" (1.676 m)   Wt 85.3 kg (188 lb)   SpO2 99%   BMI 30.34 kg/m²     Physical Exam  Vitals and nursing note reviewed. Constitutional:       General: He is not in acute distress. Appearance: Normal appearance. He is well-developed. He is not ill-appearing. HENT:      Head: Normocephalic and atraumatic. Right Ear: Tympanic membrane and ear canal normal.      Left Ear: Tympanic membrane and ear canal normal.      Nose: Mucosal edema present. Mouth/Throat:      Pharynx: No oropharyngeal exudate. Eyes:      Conjunctiva/sclera: Conjunctivae normal.   Cardiovascular:      Rate and Rhythm: Normal rate and regular rhythm. Heart sounds: Normal heart sounds. No murmur heard. Pulmonary:      Effort: Pulmonary effort is normal. Prolonged expiration present. No respiratory distress. Breath sounds: Normal air entry. No rales. Comments: Course sounds bilaterally, few end expiratory wheezes at bases forced expiration  Musculoskeletal:         General: Normal range of motion. Cervical back: Neck supple. No rigidity. Neurological:      General: No focal deficit present. Mental Status: He is alert and oriented to person, place, and time. Psychiatric:         Mood and Affect: Mood normal.         Behavior: Behavior normal.         Thought Content:  Thought content normal.       Meenakshi Meyer MD

## 2023-12-18 ENCOUNTER — APPOINTMENT (OUTPATIENT)
Dept: PHYSICAL THERAPY | Facility: REHABILITATION | Age: 73
End: 2023-12-18
Payer: COMMERCIAL

## 2023-12-20 ENCOUNTER — APPOINTMENT (OUTPATIENT)
Dept: PHYSICAL THERAPY | Facility: REHABILITATION | Age: 73
End: 2023-12-20
Payer: COMMERCIAL

## 2024-01-09 ENCOUNTER — OFFICE VISIT (OUTPATIENT)
Dept: FAMILY MEDICINE CLINIC | Facility: CLINIC | Age: 74
End: 2024-01-09
Payer: COMMERCIAL

## 2024-01-09 VITALS
WEIGHT: 190.4 LBS | SYSTOLIC BLOOD PRESSURE: 130 MMHG | HEART RATE: 66 BPM | HEIGHT: 66 IN | OXYGEN SATURATION: 97 % | TEMPERATURE: 98 F | RESPIRATION RATE: 16 BRPM | DIASTOLIC BLOOD PRESSURE: 74 MMHG | BODY MASS INDEX: 30.6 KG/M2

## 2024-01-09 DIAGNOSIS — D57.3 SICKLE CELL TRAIT (HCC): ICD-10-CM

## 2024-01-09 DIAGNOSIS — R05.8 POST-VIRAL COUGH SYNDROME: Primary | ICD-10-CM

## 2024-01-09 LAB
BASOPHILS # BLD AUTO: 18 CELLS/UL (ref 0–200)
BASOPHILS NFR BLD AUTO: 0.3 %
EOSINOPHIL # BLD AUTO: 48 CELLS/UL (ref 15–500)
EOSINOPHIL NFR BLD AUTO: 0.8 %
ERYTHROCYTE [DISTWIDTH] IN BLOOD BY AUTOMATED COUNT: 13 % (ref 11–15)
FERRITIN SERPL-MCNC: 198 NG/ML (ref 24–380)
HCT VFR BLD AUTO: 34 % (ref 38.5–50)
HGB BLD-MCNC: 11.7 G/DL (ref 13.2–17.1)
IRON SATN MFR SERPL: 24 % (CALC) (ref 20–48)
IRON SERPL-MCNC: 64 MCG/DL (ref 50–180)
LYMPHOCYTES # BLD AUTO: 1476 CELLS/UL (ref 850–3900)
LYMPHOCYTES NFR BLD AUTO: 24.6 %
MCH RBC QN AUTO: 31.4 PG (ref 27–33)
MCHC RBC AUTO-ENTMCNC: 34.4 G/DL (ref 32–36)
MCV RBC AUTO: 91.2 FL (ref 80–100)
MONOCYTES # BLD AUTO: 552 CELLS/UL (ref 200–950)
MONOCYTES NFR BLD AUTO: 9.2 %
NEUTROPHILS # BLD AUTO: 3906 CELLS/UL (ref 1500–7800)
NEUTROPHILS NFR BLD AUTO: 65.1 %
PLATELET # BLD AUTO: 183 THOUSAND/UL (ref 140–400)
PMV BLD REES-ECKER: 11 FL (ref 7.5–12.5)
RBC # BLD AUTO: 3.73 MILLION/UL (ref 4.2–5.8)
TIBC SERPL-MCNC: 270 MCG/DL (CALC) (ref 250–425)
VIT B12 SERPL-MCNC: 653 PG/ML (ref 200–1100)
WBC # BLD AUTO: 6 THOUSAND/UL (ref 3.8–10.8)

## 2024-01-09 PROCEDURE — 99213 OFFICE O/P EST LOW 20 MIN: CPT | Performed by: FAMILY MEDICINE

## 2024-01-09 NOTE — ASSESSMENT & PLAN NOTE
Recent blood work indicated normocytic anemia with hemoglobin of 11.7.  Patient will proceed with repeat labs now including CBC, iron panel and B12.

## 2024-01-09 NOTE — PROGRESS NOTES
Name: Marlon Lechuga      : 1950      MRN: [id number]  Encounter Provider: Kecia Gutierrez MD  Encounter Date: 2024   Encounter department: [facility]    Assessment & Plan     1. Post-viral cough syndrome  -     fluticasone-umeclidinium-vilanterol 100-62.5-25 mcg/actuation inhaler; Inhale 1 puff daily Rinse mouth after use.    2. Sickle cell trait (HCC)  Assessment & Plan:  Recent blood work indicated normocytic anemia with hemoglobin of 11.7.  Patient will proceed with repeat labs now including CBC, iron panel and B12.       Patient ports overall significant improvement since our last office visit on  after completing course of doxycycline.  He is nontoxic and afebrile.  Good clinical response to PRN albuterol.  History of bronchospasm/RAD in the past.  Start Trelegy 1 puff daily for the next 2 weeks.  Once cough resolves-patient can stop an inhaler.  He will contact me with any persistence or recurrence of symptoms.       Subjective       Cough has improved after recent OV .  Patient was treated with doxycycline and albuterol inhaler.    Felt  well for a while after completing antibiotic therapy    Now cough is catching up again, not as bad, he is afebrile.   No wheezing or SOB, some mucus - hard to exepectorate.  Mucinex has helped.  Patient also noticed significant improvement with PRN albuterol, no recurrences of wheezing.  He is afebrile but feels bit fatigued in the afternoon.    URI   Associated symptoms include coughing.     Review of Systems   Constitutional:  Positive for fatigue. Negative for fever.   HENT: Negative.     Eyes: Negative.    Respiratory:  Positive for cough. Negative for chest tightness and shortness of breath.    Cardiovascular: Negative.    Endocrine: Negative.    Neurological: Negative.        Past Medical History:   Diagnosis Date   • Acute biliary pancreatitis 10/18/2020   • Anemia     mild anemia   • Back pain    • Heart  burn    • History of chest pain    • Hypertension    • Palpitations    • Problems with hearing    • Problems with swallowing    • Sickle cell trait (HCC)     last assessed 10/31/14   • Snoring    • SOB (shortness of breath)    • Spinal stenosis     ddd spinal stenosis   • Swelling      Past Surgical History:   Procedure Laterality Date   • BACK SURGERY      lower back   • COLONOSCOPY  01/2016    due 2026, Smiley Hernandez  12/07   • LUMBAR EPIDURAL INJECTION  06/14/2023     Family History   Problem Relation Age of Onset   • No Known Problems Mother    • Diabetes Sister    • Sickle cell anemia Family      Social History     Socioeconomic History   • Marital status: /Civil Union     Spouse name: None   • Number of children: None   • Years of education: None   • Highest education level: None   Occupational History   • None   Tobacco Use   • Smoking status: Never   • Smokeless tobacco: Never   Vaping Use   • Vaping status: Never Used   Substance and Sexual Activity   • Alcohol use: No   • Drug use: No   • Sexual activity: None   Other Topics Concern   • None   Social History Narrative   • None     Social Determinants of Health     Financial Resource Strain: Medium Risk (8/24/2023)    Overall Financial Resource Strain (CARDIA)    • Difficulty of Paying Living Expenses: Somewhat hard   Food Insecurity: Not on file   Transportation Needs: No Transportation Needs (8/24/2023)    PRAPARE - Transportation    • Lack of Transportation (Medical): No    • Lack of Transportation (Non-Medical): No   Physical Activity: Not on file   Stress: Not on file   Social Connections: Not on file   Intimate Partner Violence: Not on file   Housing Stability: Not on file     Current Outpatient Medications on File Prior to Visit   Medication Sig   • albuterol (PROVENTIL HFA,VENTOLIN HFA) 90 mcg/act inhaler Inhale 2 puffs every 4 (four) hours as needed for wheezing or shortness of breath (cough)   • amLODIPine (NORVASC) 10 mg tablet Take 1 tablet  "(10 mg total) by mouth daily   • aspirin 81 mg chewable tablet Chew 1 tablet daily   • chlorthalidone 25 mg tablet Take 0.5 tablets (12.5 mg total) by mouth daily   • Contour Next Test test strip TEST 3 TIMES DAILY   • losartan (COZAAR) 100 MG tablet Take 1 tablet (100 mg total) by mouth daily   • metFORMIN (GLUCOPHAGE-XR) 500 mg 24 hr tablet TAKE 1 TAB DAILY FOR 2 WEEKS, THEN TAKE 2 TABS ONCE A DAY WITH FOOD   • Microlet Lancets MISC TEST 3 TIMES DAILY   • naproxen (NAPROSYN) 500 mg tablet Take 1 tablet (500 mg total) by mouth 2 (two) times a day as needed for mild pain   • rosuvastatin (CRESTOR) 10 MG tablet Take 1 tablet (10 mg total) by mouth daily   • benzonatate (TESSALON) 200 MG capsule Take 1 capsule (200 mg total) by mouth 3 (three) times a day as needed for cough (Patient not taking: Reported on 1/9/2024)     Allergies   Allergen Reactions   • Iodinated Contrast Media Other (See Comments)   • Levofloxacin Rash     Other reaction(s): Unknown Allergic Reaction     Immunization History   Administered Date(s) Administered   • COVID-19 PFIZER VACCINE 0.3 ML IM 02/21/2021, 03/13/2021, 11/19/2021   • INFLUENZA 10/12/2007, 09/25/2009, 01/17/2014, 10/22/2014, 09/20/2018, 11/30/2022, 10/10/2023   • Influenza Split High Dose Preservative Free IM 10/14/2016, 10/27/2017   • Influenza, high dose seasonal 0.7 mL 09/20/2018, 09/10/2019, 09/16/2020   • Influenza, seasonal, injectable 10/12/2007, 09/25/2009, 01/17/2014, 10/22/2014   • Pneumococcal Conjugate 13-Valent 10/14/2016   • Pneumococcal Polysaccharide PPV23 04/26/2013   • Zoster Vaccine Recombinant 08/17/2023       Objective     /74 (BP Location: Left arm, Patient Position: Sitting, Cuff Size: Large)   Pulse 66   Temp 98 °F (36.7 °C) (Temporal)   Resp 16   Ht 5' 6\" (1.676 m)   Wt 86.4 kg (190 lb 6.4 oz)   SpO2 97%   BMI 30.73 kg/m²     Physical Exam  Vitals and nursing note reviewed.   Constitutional:       General: He is not in acute distress.     " Appearance: Normal appearance. He is not ill-appearing.   Cardiovascular:      Rate and Rhythm: Normal rate and regular rhythm.      Heart sounds: Normal heart sounds. No murmur heard.  Pulmonary:      Effort: Pulmonary effort is normal. Prolonged expiration present. No respiratory distress.      Comments: Few coarse sounds at bases  Neurological:      Mental Status: He is alert.       Kecia Gutierrez MD

## 2024-01-11 DIAGNOSIS — D57.3 SICKLE CELL TRAIT (HCC): Primary | ICD-10-CM

## 2024-01-11 DIAGNOSIS — D64.9 ANEMIA, UNSPECIFIED TYPE: ICD-10-CM

## 2024-01-12 ENCOUNTER — TELEPHONE (OUTPATIENT)
Dept: HEMATOLOGY ONCOLOGY | Facility: CLINIC | Age: 74
End: 2024-01-12

## 2024-01-12 NOTE — TELEPHONE ENCOUNTER
Patient Call    Who are you speaking with? Patient    If it is not the patient, are they listed on an active communication consent form? N/A   What is the reason for this call? Called and lvm for patient informing him that we do not see sickle cell diagnosis, however we do see anemia. Left hopeline phone number if he would like to be seen for anemia   Does this require a call back? N/A   If a call back is required, please list best call back number N/a   If a call back is required, advise that a message will be forwarded to their care team and someone will return their call as soon as possible.   Did you relay this information to the patient? N/A

## 2024-02-06 DIAGNOSIS — R09.89 SYMPTOMS OF UPPER RESPIRATORY INFECTION (URI): ICD-10-CM

## 2024-02-06 RX ORDER — ALBUTEROL SULFATE 90 UG/1
AEROSOL, METERED RESPIRATORY (INHALATION)
Qty: 18 G | Refills: 1 | Status: SHIPPED | OUTPATIENT
Start: 2024-02-06

## 2024-03-25 NOTE — TELEPHONE ENCOUNTER
Patient does not need any letters  I will be happy to place a referral for Endocrinology if that is what patient/daughter  mean  If patient is planning to see endocrinologist at [facility] do have an access to epic and they can see all patient's results and my office visit notes  Please also let patient/daughter know that I would like to hold off physical therapy till he is being seen by neurosurgeon  I communicated with patient via my chart messages within past few days regarding results of blood work and back MRI  I did place an order for [facility] and he should contact them to schedule an appointment      Thank you
Patient's daughter called stated that patient's needs a consult letter for [facility] in [place], she stated that they would like to get him in to see them for his possible diabetes   Please advice
Spoke with patient  Made aware of  provider's instructions  Patient verbalized understanding and was agreeable w/ plan 
none

## 2024-03-27 DIAGNOSIS — K59.00 CONSTIPATION, UNSPECIFIED CONSTIPATION TYPE: Primary | ICD-10-CM

## 2024-04-22 ENCOUNTER — PATIENT MESSAGE (OUTPATIENT)
Dept: FAMILY MEDICINE CLINIC | Facility: CLINIC | Age: 74
End: 2024-04-22

## 2024-04-23 DIAGNOSIS — R80.9 MICROALBUMINURIA DUE TO TYPE 2 DIABETES MELLITUS  (HCC): ICD-10-CM

## 2024-04-23 DIAGNOSIS — E11.29 MICROALBUMINURIA DUE TO TYPE 2 DIABETES MELLITUS  (HCC): ICD-10-CM

## 2024-04-23 DIAGNOSIS — D57.3 SICKLE CELL TRAIT (HCC): ICD-10-CM

## 2024-04-23 DIAGNOSIS — D64.9 ANEMIA, UNSPECIFIED TYPE: ICD-10-CM

## 2024-04-23 DIAGNOSIS — E66.9 TYPE 2 DIABETES MELLITUS WITH OBESITY  (HCC): Primary | Chronic | ICD-10-CM

## 2024-04-23 DIAGNOSIS — E78.2 MIXED HYPERLIPIDEMIA: Chronic | ICD-10-CM

## 2024-04-23 DIAGNOSIS — E11.69 TYPE 2 DIABETES MELLITUS WITH OBESITY  (HCC): Primary | Chronic | ICD-10-CM

## 2024-05-01 LAB
ALBUMIN SERPL-MCNC: 4.3 G/DL (ref 3.6–5.1)
ALBUMIN/CREAT UR: 51 MG/G CREAT
ALBUMIN/GLOB SERPL: 2 (CALC) (ref 1–2.5)
ALP SERPL-CCNC: 74 U/L (ref 35–144)
ALT SERPL-CCNC: 28 U/L (ref 9–46)
AST SERPL-CCNC: 20 U/L (ref 10–35)
BASOPHILS # BLD AUTO: 21 CELLS/UL (ref 0–200)
BASOPHILS NFR BLD AUTO: 0.5 %
BILIRUB SERPL-MCNC: 0.8 MG/DL (ref 0.2–1.2)
BUN SERPL-MCNC: 15 MG/DL (ref 7–25)
BUN/CREAT SERPL: ABNORMAL (CALC) (ref 6–22)
CALCIUM SERPL-MCNC: 9.4 MG/DL (ref 8.6–10.3)
CHLORIDE SERPL-SCNC: 104 MMOL/L (ref 98–110)
CHOLEST SERPL-MCNC: 116 MG/DL
CHOLEST/HDLC SERPL: 2.6 (CALC)
CO2 SERPL-SCNC: 31 MMOL/L (ref 20–32)
CREAT SERPL-MCNC: 0.84 MG/DL (ref 0.7–1.28)
CREAT UR-MCNC: 73 MG/DL (ref 20–320)
EOSINOPHIL # BLD AUTO: 70 CELLS/UL (ref 15–500)
EOSINOPHIL NFR BLD AUTO: 1.7 %
ERYTHROCYTE [DISTWIDTH] IN BLOOD BY AUTOMATED COUNT: 13.9 % (ref 11–15)
FOLATE SERPL-MCNC: 16.1 NG/ML
GFR/BSA.PRED SERPLBLD CYS-BASED-ARV: 90 ML/MIN/1.73M2
GLOBULIN SER CALC-MCNC: 2.2 G/DL (CALC) (ref 1.9–3.7)
GLUCOSE SERPL-MCNC: 127 MG/DL (ref 65–99)
HBA1C MFR BLD: 6 % OF TOTAL HGB
HCT VFR BLD AUTO: 35.9 % (ref 38.5–50)
HDLC SERPL-MCNC: 44 MG/DL
HGB BLD-MCNC: 12.3 G/DL (ref 13.2–17.1)
LDLC SERPL CALC-MCNC: 50 MG/DL (CALC)
LYMPHOCYTES # BLD AUTO: 1497 CELLS/UL (ref 850–3900)
LYMPHOCYTES NFR BLD AUTO: 36.5 %
MCH RBC QN AUTO: 32.2 PG (ref 27–33)
MCHC RBC AUTO-ENTMCNC: 34.3 G/DL (ref 32–36)
MCV RBC AUTO: 94 FL (ref 80–100)
MICROALBUMIN UR-MCNC: 3.7 MG/DL
MONOCYTES # BLD AUTO: 455 CELLS/UL (ref 200–950)
MONOCYTES NFR BLD AUTO: 11.1 %
NEUTROPHILS # BLD AUTO: 2058 CELLS/UL (ref 1500–7800)
NEUTROPHILS NFR BLD AUTO: 50.2 %
NONHDLC SERPL-MCNC: 72 MG/DL (CALC)
PLATELET # BLD AUTO: 155 THOUSAND/UL (ref 140–400)
PMV BLD REES-ECKER: 10.3 FL (ref 7.5–12.5)
POTASSIUM SERPL-SCNC: 4.4 MMOL/L (ref 3.5–5.3)
PROT SERPL-MCNC: 6.5 G/DL (ref 6.1–8.1)
RBC # BLD AUTO: 3.82 MILLION/UL (ref 4.2–5.8)
SODIUM SERPL-SCNC: 141 MMOL/L (ref 135–146)
TRIGL SERPL-MCNC: 134 MG/DL
TSH SERPL-ACNC: 2.63 MIU/L (ref 0.4–4.5)
VIT B12 SERPL-MCNC: 435 PG/ML (ref 200–1100)
WBC # BLD AUTO: 4.1 THOUSAND/UL (ref 3.8–10.8)

## 2024-05-02 ENCOUNTER — TELEPHONE (OUTPATIENT)
Dept: FAMILY MEDICINE CLINIC | Facility: CLINIC | Age: 74
End: 2024-05-02

## 2024-05-02 NOTE — TELEPHONE ENCOUNTER
----- Message from Kecia Gutierrez MD sent at 5/1/2024  9:46 PM EDT -----  Blood work is overall stable.  Diabetes is well-controlled.  Hemoglobin is still decreased but has improved.  Please schedule follow-up office visit.  Thank you

## 2024-05-02 NOTE — RESULT ENCOUNTER NOTE
Blood work is overall stable.  Diabetes is well-controlled.  Hemoglobin is still decreased but has improved.  Please schedule follow-up office visit.  Thank you

## 2024-05-23 ENCOUNTER — OFFICE VISIT (OUTPATIENT)
Dept: FAMILY MEDICINE CLINIC | Facility: CLINIC | Age: 74
End: 2024-05-23
Payer: COMMERCIAL

## 2024-05-23 VITALS
OXYGEN SATURATION: 97 % | HEART RATE: 56 BPM | SYSTOLIC BLOOD PRESSURE: 136 MMHG | BODY MASS INDEX: 30.12 KG/M2 | HEIGHT: 66 IN | RESPIRATION RATE: 16 BRPM | WEIGHT: 187.4 LBS | TEMPERATURE: 97.8 F | DIASTOLIC BLOOD PRESSURE: 82 MMHG

## 2024-05-23 DIAGNOSIS — J45.20 MILD INTERMITTENT REACTIVE AIRWAY DISEASE WITHOUT COMPLICATION: ICD-10-CM

## 2024-05-23 DIAGNOSIS — D57.3 SICKLE CELL TRAIT (HCC): ICD-10-CM

## 2024-05-23 DIAGNOSIS — E11.69 TYPE 2 DIABETES MELLITUS WITH OBESITY  (HCC): Primary | ICD-10-CM

## 2024-05-23 DIAGNOSIS — I10 BENIGN ESSENTIAL HYPERTENSION: ICD-10-CM

## 2024-05-23 DIAGNOSIS — R80.9 HYPERTENSION WITH ALBUMINURIA: ICD-10-CM

## 2024-05-23 DIAGNOSIS — L30.9 DERMATITIS: ICD-10-CM

## 2024-05-23 DIAGNOSIS — I10 HYPERTENSION WITH ALBUMINURIA: ICD-10-CM

## 2024-05-23 DIAGNOSIS — E11.29 MICROALBUMINURIA DUE TO TYPE 2 DIABETES MELLITUS  (HCC): ICD-10-CM

## 2024-05-23 DIAGNOSIS — E66.9 TYPE 2 DIABETES MELLITUS WITH OBESITY  (HCC): Primary | ICD-10-CM

## 2024-05-23 DIAGNOSIS — Z87.09 HISTORY OF BRONCHITIS: ICD-10-CM

## 2024-05-23 DIAGNOSIS — D64.9 ANEMIA, UNSPECIFIED TYPE: ICD-10-CM

## 2024-05-23 DIAGNOSIS — E78.2 MIXED HYPERLIPIDEMIA: ICD-10-CM

## 2024-05-23 DIAGNOSIS — R80.9 MICROALBUMINURIA DUE TO TYPE 2 DIABETES MELLITUS  (HCC): ICD-10-CM

## 2024-05-23 PROCEDURE — 99214 OFFICE O/P EST MOD 30 MIN: CPT | Performed by: FAMILY MEDICINE

## 2024-05-23 RX ORDER — TRIAMCINOLONE ACETONIDE 5 MG/G
CREAM TOPICAL 3 TIMES DAILY
Qty: 45 G | Refills: 1 | Status: SHIPPED | OUTPATIENT
Start: 2024-05-23

## 2024-05-23 RX ORDER — AMLODIPINE BESYLATE 10 MG/1
10 TABLET ORAL DAILY
Qty: 90 TABLET | Refills: 3 | Status: SHIPPED | OUTPATIENT
Start: 2024-05-23

## 2024-05-23 RX ORDER — ROSUVASTATIN CALCIUM 10 MG/1
10 TABLET, COATED ORAL DAILY
Qty: 90 TABLET | Refills: 3 | Status: SHIPPED | OUTPATIENT
Start: 2024-05-23

## 2024-05-23 RX ORDER — LOSARTAN POTASSIUM 100 MG/1
100 TABLET ORAL DAILY
Qty: 90 TABLET | Refills: 3 | Status: SHIPPED | OUTPATIENT
Start: 2024-05-23

## 2024-05-23 RX ORDER — ALBUTEROL SULFATE 90 UG/1
2 AEROSOL, METERED RESPIRATORY (INHALATION) EVERY 4 HOURS PRN
Qty: 18 G | Refills: 1 | Status: SHIPPED | OUTPATIENT
Start: 2024-05-23

## 2024-05-23 RX ORDER — AMOXICILLIN AND CLAVULANATE POTASSIUM 875; 125 MG/1; MG/1
1 TABLET, FILM COATED ORAL 2 TIMES DAILY
Qty: 20 TABLET | Refills: 0 | Status: SHIPPED | OUTPATIENT
Start: 2024-05-23 | End: 2024-06-02

## 2024-05-23 RX ORDER — METFORMIN HYDROCHLORIDE 500 MG/1
TABLET, EXTENDED RELEASE ORAL
Qty: 180 TABLET | Refills: 3 | Status: SHIPPED | OUTPATIENT
Start: 2024-05-23

## 2024-05-23 RX ORDER — CHLORTHALIDONE 25 MG/1
12.5 TABLET ORAL DAILY
Qty: 45 TABLET | Refills: 3 | Status: SHIPPED | OUTPATIENT
Start: 2024-05-23

## 2024-05-23 NOTE — PROGRESS NOTES
Ambulatory Visit  Name: Marlon Lechuga      : 1950      MRN: [id number]  Encounter Provider: Kecia Gutierrez MD  Encounter Date: 2024   Encounter department: [facility]    Assessment & Plan   1. Type 2 diabetes mellitus with obesity  (HCC)  -     metFORMIN (GLUCOPHAGE-XR) 500 mg 24 hr tablet; Take 2 tabs once a day WITH FOOD  -     CBC and differential; Future  -     Comprehensive metabolic panel; Future  -     Hemoglobin A1C; Future  2. Sickle cell trait (HCC)  -     Ambulatory Referral to Hematology / Oncology; Future  3. Anemia, unspecified type  Assessment & Plan:  Mild normocytic anemia.  Hemoglobin 11.7 in January, up to 12.3 on .  History of sickle cell trait.  Patient denies symptoms of abdominal pain, dyspepsia, bright red blood per rectum or melena.  Continue monitoring, repeat labs in 3 to 4 months.  Referral to hematology  Orders:  -     Ambulatory Referral to Hematology / Oncology; Future  4. History of bronchitis  Assessment & Plan:  History of bronchitis with bronchospasm.  Patient travels to [facility] this summer.  I provided him with backup Augmentin and albuterol inhaler.  Orders:  -     amoxicillin-clavulanate (AUGMENTIN) 875-125 mg per tablet; Take 1 tablet by mouth 2 (two) times a day for 10 days  5. Mild intermittent reactive airway disease without complication  -     albuterol (PROVENTIL HFA,VENTOLIN HFA) 90 mcg/act inhaler; Inhale 2 puffs every 4 (four) hours as needed for wheezing  6. Benign essential hypertension  Assessment & Plan:  Blood pressure is well-controlled.  Patient reports intermittent leg edema.  Currently on chlorthalidone 12.5 mg daily.  He may increase Rx up to 25 mg daily for a few days as needed.  Orders:  -     amLODIPine (NORVASC) 10 mg tablet; Take 1 tablet (10 mg total) by mouth daily  -     losartan (COZAAR) 100 MG tablet; Take 1 tablet (100 mg total) by mouth daily  7. Mixed hyperlipidemia  -     rosuvastatin (CRESTOR)  10 MG tablet; Take 1 tablet (10 mg total) by mouth daily  8. Hypertension with albuminuria  Assessment & Plan:  Blood pressure is well-controlled.  Patient reports intermittent leg edema.  Currently on chlorthalidone 12.5 mg daily.  He may increase Rx up to 25 mg daily for a few days as needed.  Orders:  -     chlorthalidone 25 mg tablet; Take 0.5 tablets (12.5 mg total) by mouth daily  9. Dermatitis  -     triamcinolone (KENALOG) 0.5 % cream; Apply topically 3 (three) times a day  10. Microalbuminuria due to type 2 diabetes mellitus  (HCC)  Assessment & Plan:    Lab Results   Component Value Date    HGBA1C 6.0 (H) 04/30/2024   Excellent glycemic control.  Continue metformin and losartan       Patient Instructions   You may take full dose of water pill if needed for leg swelling, okay to take 1 full tablet once or twice per week  Knee-high compression stockings, low compression, 15 to 20 mmHg, you can buy them on [employer] or [employer]  Hematology consult.  If you are not able to schedule an appointment before your departure we will reassess your blood count when you are back, blood work is due in August/September  Backup antibiotic, Augmentin ,and inhaler, albuterol,if you develop symptoms of cough/bronchitis.  If medications are not helpful after a few days-please consult local physician      History of Present Illness     HPI  Review of Systems   Constitutional: Negative.    HENT: Negative.     Respiratory: Negative.     Cardiovascular: Negative.    Gastrointestinal: Negative.    Genitourinary: Negative.    Neurological: Negative.    Psychiatric/Behavioral: Negative.       Past Medical History:   Diagnosis Date   • Acute biliary pancreatitis 10/18/2020   • Anemia     mild anemia   • Back pain    • Heart burn    • History of chest pain    • Hypertension    • Palpitations    • Problems with hearing    • Problems with swallowing    • Sickle cell trait (HCC)     last assessed 10/31/14   • Snoring    • SOB (shortness of  breath)    • Spinal stenosis     ddd spinal stenosis   • Swelling      Past Surgical History:   Procedure Laterality Date   • BACK SURGERY      lower back   • COLONOSCOPY  01/2016    due 2026, Smiley Hernandez  12/07   • LUMBAR EPIDURAL INJECTION  06/14/2023     Family History   Problem Relation Age of Onset   • No Known Problems Mother    • Diabetes Sister    • Sickle cell anemia Family      Social History     Tobacco Use   • Smoking status: Never   • Smokeless tobacco: Never   Vaping Use   • Vaping status: Never Used   Substance and Sexual Activity   • Alcohol use: No   • Drug use: No   • Sexual activity: Not on file     Current Outpatient Medications on File Prior to Visit   Medication Sig   • aspirin 81 mg chewable tablet Chew 1 tablet daily   • Contour Next Test test strip TEST 3 TIMES DAILY   • linaCLOtide 145 MCG CAPS Take 1 capsule once a day 30 minutes prior to the meal as needed for constipation   • Microlet Lancets MISC TEST 3 TIMES DAILY   • naproxen (NAPROSYN) 500 mg tablet Take 1 tablet (500 mg total) by mouth 2 (two) times a day as needed for mild pain   • benzonatate (TESSALON) 200 MG capsule Take 1 capsule (200 mg total) by mouth 3 (three) times a day as needed for cough (Patient not taking: Reported on 1/9/2024)   • fluticasone-umeclidinium-vilanterol 100-62.5-25 mcg/actuation inhaler Inhale 1 puff daily Rinse mouth after use. (Patient not taking: Reported on 5/23/2024)     Allergies   Allergen Reactions   • Iodinated Contrast Media Other (See Comments)   • Levofloxacin Rash     Other reaction(s): Unknown Allergic Reaction     Immunization History   Administered Date(s) Administered   • COVID-19 PFIZER VACCINE 0.3 ML IM 02/21/2021, 03/13/2021, 11/19/2021   • COVID-19 Pfizer Vac BIVALENT Nathen-sucrose 12 Yr+ IM 10/14/2022   • INFLUENZA 10/12/2007, 09/25/2009, 01/17/2014, 10/22/2014, 09/20/2018, 11/30/2022, 10/10/2023   • Influenza Split High Dose Preservative Free IM 10/14/2016, 10/27/2017   • Influenza,  "high dose seasonal 0.7 mL 09/20/2018, 09/10/2019, 09/16/2020   • Influenza, seasonal, injectable 10/12/2007, 09/25/2009, 01/17/2014, 10/22/2014   • Pneumococcal Conjugate 13-Valent 10/14/2016   • Pneumococcal Polysaccharide PPV23 04/26/2013   • Zoster Vaccine Recombinant 08/17/2023     Objective     /82 (BP Location: Left arm, Patient Position: Sitting, Cuff Size: Standard)   Pulse 56   Temp 97.8 °F (36.6 °C) (Temporal)   Resp 16   Ht 5' 6\" (1.676 m)   Wt 85 kg (187 lb 6.4 oz)   SpO2 97%   BMI 30.25 kg/m²     Physical Exam  Constitutional:       General: He is not in acute distress.     Appearance: Normal appearance. He is not ill-appearing.   Neurological:      General: No focal deficit present.      Mental Status: He is alert and oriented to person, place, and time.   Psychiatric:         Mood and Affect: Mood normal.         Behavior: Behavior normal.       Administrative Statements         "

## 2024-05-23 NOTE — PATIENT INSTRUCTIONS
You may take full dose of water pill if needed for leg swelling, okay to take 1 full tablet once or twice per week  Knee-high compression stockings, low compression, 15 to 20 mmHg, you can buy them on [employer] or [employer]  Hematology consult.  If you are not able to schedule an appointment before your departure we will reassess your blood count when you are back, blood work is due in August/September  Backup antibiotic, Augmentin ,and inhaler, albuterol,if you develop symptoms of cough/bronchitis.  If medications are not helpful after a few days-please consult local physician   Yes

## 2024-05-26 PROBLEM — J45.909 REACTIVE AIRWAY DISEASE: Status: ACTIVE | Noted: 2024-05-26

## 2024-05-26 PROBLEM — Z87.09 HISTORY OF BRONCHITIS: Status: ACTIVE | Noted: 2024-05-26

## 2024-05-26 NOTE — ASSESSMENT & PLAN NOTE
History of bronchitis with bronchospasm.  Patient travels to [facility] this summer.  I provided him with backup Augmentin and albuterol inhaler.

## 2024-05-26 NOTE — ASSESSMENT & PLAN NOTE
Lab Results   Component Value Date    HGBA1C 6.0 (H) 04/30/2024   Excellent glycemic control.  Continue metformin and losartan

## 2024-05-26 NOTE — ASSESSMENT & PLAN NOTE
Mild normocytic anemia.  Hemoglobin 11.7 in January, up to 12.3 on April 30.  History of sickle cell trait.  Patient denies symptoms of abdominal pain, dyspepsia, bright red blood per rectum or melena.  Continue monitoring, repeat labs in 3 to 4 months.  Referral to hematology

## 2024-05-26 NOTE — ASSESSMENT & PLAN NOTE
Blood pressure is well-controlled.  Patient reports intermittent leg edema.  Currently on chlorthalidone 12.5 mg daily.  He may increase Rx up to 25 mg daily for a few days as needed.

## 2024-05-28 ENCOUNTER — TELEPHONE (OUTPATIENT)
Dept: HEMATOLOGY ONCOLOGY | Facility: CLINIC | Age: 74
End: 2024-05-28

## 2024-05-28 NOTE — TELEPHONE ENCOUNTER
I called Marlon in response to a referral that was received for patient to establish care with Hematology.     Outreach was made to schedule a consultation.    Providers do not see referring diagnosis, in basket message sent to provider.   The referral has been closed.

## 2024-05-30 DIAGNOSIS — E66.9 TYPE 2 DIABETES MELLITUS WITH OBESITY  (HCC): ICD-10-CM

## 2024-05-30 DIAGNOSIS — E11.69 TYPE 2 DIABETES MELLITUS WITH OBESITY  (HCC): ICD-10-CM

## 2024-05-31 RX ORDER — LANCETS
EACH MISCELLANEOUS
Qty: 300 EACH | Refills: 1 | Status: SHIPPED | OUTPATIENT
Start: 2024-05-31

## 2024-06-19 ENCOUNTER — TELEPHONE (OUTPATIENT)
Age: 74
End: 2024-06-19

## 2024-06-19 DIAGNOSIS — K59.00 CONSTIPATION, UNSPECIFIED CONSTIPATION TYPE: ICD-10-CM

## 2024-06-19 NOTE — TELEPHONE ENCOUNTER
Patient has prescription for Linzess on file, it was sent to the pharmacy on 3/27/2024.  He can use Linzess for constipation if MiraLAX is not effective I will send a refill  in case he is out off medication.    Alternatively, patient can use combination of MiraLAX with Dulcolax.  Both OTC.    Please also let patient know that I received a message from [facility] hematology group.  Unfortunately they are not evaluating patients with sickle cell trait so they recommended that he schedules evaluation with hematology at [facility].    Please send copy of this message via [employer] as well     thank you

## 2024-06-19 NOTE — TELEPHONE ENCOUNTER
Pts daughter called stating that pt is traveling tomorrow and typically gets constipated and gets really sick from it. Pts daughter said Miralax is not enough and is wondering if a script can be sent in for something to help him if he needs it.     Please f/u with pts daughter regarding this question. Noted no communication sheet has been updated since 2022.

## 2024-06-20 DIAGNOSIS — E66.9 TYPE 2 DIABETES MELLITUS WITH OBESITY  (HCC): ICD-10-CM

## 2024-06-20 DIAGNOSIS — E11.69 TYPE 2 DIABETES MELLITUS WITH OBESITY  (HCC): ICD-10-CM

## 2024-10-31 DIAGNOSIS — E66.9 TYPE 2 DIABETES MELLITUS WITH OBESITY  (HCC): ICD-10-CM

## 2024-10-31 DIAGNOSIS — E11.69 TYPE 2 DIABETES MELLITUS WITH OBESITY  (HCC): ICD-10-CM

## 2024-10-31 RX ORDER — LANCETS
EACH MISCELLANEOUS
Qty: 300 EACH | Refills: 1 | Status: SHIPPED | OUTPATIENT
Start: 2024-10-31

## 2024-11-20 DIAGNOSIS — E11.69 TYPE 2 DIABETES MELLITUS WITH OBESITY  (HCC): ICD-10-CM

## 2024-11-20 DIAGNOSIS — E66.9 TYPE 2 DIABETES MELLITUS WITH OBESITY  (HCC): ICD-10-CM

## 2024-12-10 DIAGNOSIS — J45.20 MILD INTERMITTENT REACTIVE AIRWAY DISEASE WITHOUT COMPLICATION: ICD-10-CM

## 2024-12-11 RX ORDER — ALBUTEROL SULFATE 90 UG/1
INHALANT RESPIRATORY (INHALATION)
Qty: 18 G | Refills: 1 | Status: SHIPPED | OUTPATIENT
Start: 2024-12-11

## 2025-02-20 ENCOUNTER — TELEPHONE (OUTPATIENT)
Dept: FAMILY MEDICINE CLINIC | Facility: CLINIC | Age: 75
End: 2025-02-20

## 2025-02-20 NOTE — TELEPHONE ENCOUNTER
L/M for pt to return this call to schedule a   50+ PE.-40 minute appointment.   's schedule is pretty full until May.   OK to Schedule appt for then.

## 2025-04-01 DIAGNOSIS — E66.9 TYPE 2 DIABETES MELLITUS WITH OBESITY  (HCC): ICD-10-CM

## 2025-04-01 DIAGNOSIS — E11.69 TYPE 2 DIABETES MELLITUS WITH OBESITY  (HCC): ICD-10-CM

## 2025-04-02 RX ORDER — LANCETS
EACH MISCELLANEOUS 3 TIMES DAILY
Qty: 300 EACH | Refills: 3 | Status: SHIPPED | OUTPATIENT
Start: 2025-04-02 | End: 2025-04-08

## 2025-04-08 ENCOUNTER — OFFICE VISIT (OUTPATIENT)
Dept: FAMILY MEDICINE CLINIC | Facility: CLINIC | Age: 75
End: 2025-04-08
Payer: COMMERCIAL

## 2025-04-08 ENCOUNTER — APPOINTMENT (OUTPATIENT)
Dept: LAB | Facility: CLINIC | Age: 75
End: 2025-04-08
Payer: COMMERCIAL

## 2025-04-08 VITALS
BODY MASS INDEX: 30.18 KG/M2 | TEMPERATURE: 97.6 F | DIASTOLIC BLOOD PRESSURE: 60 MMHG | SYSTOLIC BLOOD PRESSURE: 120 MMHG | OXYGEN SATURATION: 98 % | RESPIRATION RATE: 16 BRPM | HEIGHT: 66 IN | HEART RATE: 59 BPM | WEIGHT: 187.8 LBS

## 2025-04-08 DIAGNOSIS — R80.9 MICROALBUMINURIA DUE TO TYPE 2 DIABETES MELLITUS  (HCC): ICD-10-CM

## 2025-04-08 DIAGNOSIS — D64.9 ANEMIA, UNSPECIFIED TYPE: ICD-10-CM

## 2025-04-08 DIAGNOSIS — R80.9 HYPERTENSION WITH ALBUMINURIA: ICD-10-CM

## 2025-04-08 DIAGNOSIS — K59.00 CONSTIPATION, UNSPECIFIED CONSTIPATION TYPE: ICD-10-CM

## 2025-04-08 DIAGNOSIS — E11.69 TYPE 2 DIABETES MELLITUS WITH OBESITY  (HCC): Primary | Chronic | ICD-10-CM

## 2025-04-08 DIAGNOSIS — E66.9 TYPE 2 DIABETES MELLITUS WITH OBESITY  (HCC): Primary | Chronic | ICD-10-CM

## 2025-04-08 DIAGNOSIS — M48.062 SPINAL STENOSIS OF LUMBAR REGION WITH NEUROGENIC CLAUDICATION: ICD-10-CM

## 2025-04-08 DIAGNOSIS — E78.2 MIXED HYPERLIPIDEMIA: Chronic | ICD-10-CM

## 2025-04-08 DIAGNOSIS — E11.29 MICROALBUMINURIA DUE TO TYPE 2 DIABETES MELLITUS  (HCC): ICD-10-CM

## 2025-04-08 DIAGNOSIS — I10 HYPERTENSION WITH ALBUMINURIA: ICD-10-CM

## 2025-04-08 DIAGNOSIS — I73.9 CLAUDICATION (HCC): ICD-10-CM

## 2025-04-08 DIAGNOSIS — I44.7 COMPLETE LEFT BUNDLE BRANCH BLOCK (LBBB): ICD-10-CM

## 2025-04-08 DIAGNOSIS — D57.3 SICKLE CELL TRAIT (HCC): ICD-10-CM

## 2025-04-08 DIAGNOSIS — E66.9 TYPE 2 DIABETES MELLITUS WITH OBESITY  (HCC): Chronic | ICD-10-CM

## 2025-04-08 DIAGNOSIS — Z23 ENCOUNTER FOR IMMUNIZATION: ICD-10-CM

## 2025-04-08 DIAGNOSIS — E11.69 TYPE 2 DIABETES MELLITUS WITH OBESITY  (HCC): Chronic | ICD-10-CM

## 2025-04-08 DIAGNOSIS — M47.816 LUMBAR SPONDYLOSIS: ICD-10-CM

## 2025-04-08 PROBLEM — J45.909 REACTIVE AIRWAY DISEASE: Status: RESOLVED | Noted: 2024-05-26 | Resolved: 2025-04-08

## 2025-04-08 LAB
ALBUMIN SERPL BCG-MCNC: 4.6 G/DL (ref 3.5–5)
ALP SERPL-CCNC: 74 U/L (ref 34–104)
ALT SERPL W P-5'-P-CCNC: 35 U/L (ref 7–52)
ANION GAP SERPL CALCULATED.3IONS-SCNC: 9 MMOL/L (ref 4–13)
AST SERPL W P-5'-P-CCNC: 26 U/L (ref 13–39)
BASOPHILS # BLD AUTO: 0.03 THOUSANDS/ÂΜL (ref 0–0.1)
BASOPHILS NFR BLD AUTO: 1 % (ref 0–1)
BILIRUB SERPL-MCNC: 0.74 MG/DL (ref 0.2–1)
BUN SERPL-MCNC: 13 MG/DL (ref 5–25)
CALCIUM SERPL-MCNC: 9.5 MG/DL (ref 8.4–10.2)
CHLORIDE SERPL-SCNC: 103 MMOL/L (ref 96–108)
CHOLEST SERPL-MCNC: 109 MG/DL (ref ?–200)
CO2 SERPL-SCNC: 29 MMOL/L (ref 21–32)
CREAT SERPL-MCNC: 0.89 MG/DL (ref 0.6–1.3)
CREAT UR-MCNC: 66.3 MG/DL
EOSINOPHIL # BLD AUTO: 0.05 THOUSAND/ÂΜL (ref 0–0.61)
EOSINOPHIL NFR BLD AUTO: 1 % (ref 0–6)
ERYTHROCYTE [DISTWIDTH] IN BLOOD BY AUTOMATED COUNT: 13 % (ref 11.6–15.1)
GFR SERPL CREATININE-BSD FRML MDRD: 82 ML/MIN/1.73SQ M
GLUCOSE P FAST SERPL-MCNC: 160 MG/DL (ref 65–99)
HCT VFR BLD AUTO: 34.5 % (ref 36.5–49.3)
HDLC SERPL-MCNC: 44 MG/DL
HGB BLD-MCNC: 12 G/DL (ref 12–17)
IMM GRANULOCYTES # BLD AUTO: 0.01 THOUSAND/UL (ref 0–0.2)
IMM GRANULOCYTES NFR BLD AUTO: 0 % (ref 0–2)
IRON SATN MFR SERPL: 25 % (ref 15–50)
IRON SERPL-MCNC: 87 UG/DL (ref 50–212)
LDLC SERPL CALC-MCNC: 40 MG/DL (ref 0–100)
LYMPHOCYTES # BLD AUTO: 1.53 THOUSANDS/ÂΜL (ref 0.6–4.47)
LYMPHOCYTES NFR BLD AUTO: 32 % (ref 14–44)
MCH RBC QN AUTO: 32.7 PG (ref 26.8–34.3)
MCHC RBC AUTO-ENTMCNC: 34.8 G/DL (ref 31.4–37.4)
MCV RBC AUTO: 94 FL (ref 82–98)
MICROALBUMIN UR-MCNC: 103 MG/L
MICROALBUMIN/CREAT 24H UR: 155 MG/G CREATININE (ref 0–30)
MONOCYTES # BLD AUTO: 0.54 THOUSAND/ÂΜL (ref 0.17–1.22)
MONOCYTES NFR BLD AUTO: 11 % (ref 4–12)
NEUTROPHILS # BLD AUTO: 2.56 THOUSANDS/ÂΜL (ref 1.85–7.62)
NEUTS SEG NFR BLD AUTO: 55 % (ref 43–75)
NRBC BLD AUTO-RTO: 0 /100 WBCS
PLATELET # BLD AUTO: 164 THOUSANDS/UL (ref 149–390)
PMV BLD AUTO: 11.4 FL (ref 8.9–12.7)
POTASSIUM SERPL-SCNC: 4.5 MMOL/L (ref 3.5–5.3)
PROT SERPL-MCNC: 7 G/DL (ref 6.4–8.4)
RBC # BLD AUTO: 3.67 MILLION/UL (ref 3.88–5.62)
SL AMB POCT HEMOGLOBIN AIC: 6.2 (ref ?–6.5)
SODIUM SERPL-SCNC: 141 MMOL/L (ref 135–147)
TIBC SERPL-MCNC: 341.6 UG/DL (ref 250–450)
TRANSFERRIN SERPL-MCNC: 244 MG/DL (ref 203–362)
TRIGL SERPL-MCNC: 126 MG/DL (ref ?–150)
UIBC SERPL-MCNC: 255 UG/DL (ref 155–355)
WBC # BLD AUTO: 4.72 THOUSAND/UL (ref 4.31–10.16)

## 2025-04-08 PROCEDURE — 80053 COMPREHEN METABOLIC PANEL: CPT

## 2025-04-08 PROCEDURE — 99397 PER PM REEVAL EST PAT 65+ YR: CPT | Performed by: FAMILY MEDICINE

## 2025-04-08 PROCEDURE — 82728 ASSAY OF FERRITIN: CPT

## 2025-04-08 PROCEDURE — 82746 ASSAY OF FOLIC ACID SERUM: CPT

## 2025-04-08 PROCEDURE — 83540 ASSAY OF IRON: CPT

## 2025-04-08 PROCEDURE — 90750 HZV VACC RECOMBINANT IM: CPT

## 2025-04-08 PROCEDURE — 80061 LIPID PANEL: CPT

## 2025-04-08 PROCEDURE — 83550 IRON BINDING TEST: CPT

## 2025-04-08 PROCEDURE — 83036 HEMOGLOBIN GLYCOSYLATED A1C: CPT | Performed by: FAMILY MEDICINE

## 2025-04-08 PROCEDURE — 83036 HEMOGLOBIN GLYCOSYLATED A1C: CPT

## 2025-04-08 PROCEDURE — 84443 ASSAY THYROID STIM HORMONE: CPT

## 2025-04-08 PROCEDURE — 82607 VITAMIN B-12: CPT

## 2025-04-08 PROCEDURE — 90471 IMMUNIZATION ADMIN: CPT

## 2025-04-08 PROCEDURE — 85025 COMPLETE CBC W/AUTO DIFF WBC: CPT

## 2025-04-08 PROCEDURE — 99214 OFFICE O/P EST MOD 30 MIN: CPT | Performed by: FAMILY MEDICINE

## 2025-04-08 PROCEDURE — 82043 UR ALBUMIN QUANTITATIVE: CPT

## 2025-04-08 PROCEDURE — 82570 ASSAY OF URINE CREATININE: CPT

## 2025-04-08 PROCEDURE — 36415 COLL VENOUS BLD VENIPUNCTURE: CPT

## 2025-04-08 NOTE — ASSESSMENT & PLAN NOTE
No complaints of angina or dyspnea.  Patient follows with cardiologist at [facility]:  •  VAS screening; Future

## 2025-04-08 NOTE — PROGRESS NOTES
Adult Annual Physical  Name: Marlon Lechuga      : 1950      MRN: [id number]  Encounter Provider: Kecia Gutierrez MD  Encounter Date: 2025   Encounter department: [facility]    :  Assessment & Plan  Type 2 diabetes mellitus with obesity  (HCC)    Lab Results   Component Value Date    HGBA1C 6.0 (H) 2025   Good glycemic control.  Metformin ER 1000 mg daily.  Patient follows for routine diabetic eye exam  Proceed with complete blood work  Orders:  •  POCT hemoglobin A1c  •  CBC and differential; Future  •  Comprehensive metabolic panel; Future  •  Albumin / creatinine urine ratio; Future    Encounter for immunization  Start Shingrix vaccination today, schedule second dose with the nurse in 2 to 3-months  Orders:  •  Zoster Vaccine Recombinant IM    Mixed hyperlipidemia  Continue statin/rosuvastatin 10 mg daily.  Orders:  •  VAS screening; Future  •  Lipid Panel with Direct LDL reflex; Future  •  TSH, 3rd generation; Future    Claudication (HCC)  Intermittent symptoms of lower extremity claudication.  Neurogenic versus vascular.  I suspect his symptoms are likely related due to spinal stenosis.  Nevertheless giving risk factors of hypertension, hyperlipidemia and diabetes we will proceed with vascular screening.  Referral to PT.  Patient will follow-up with spine and pain at [facility]   Orders:  •  VAS screening; Future  •  Ambulatory referral to Physical Therapy; Future    Complete left bundle branch block (LBBB)  No complaints of angina or dyspnea.  Patient follows with cardiologist at [facility]  Orders:  •  VAS screening; Future    Spinal stenosis of lumbar region with neurogenic claudication    Currently follows with pain management [facility], Dr. Castro   Symptoms of neurogenic claudication, left-sided   MRI 2022: spondylotic degenerative changes are seen diffusely most prominently at L2-3 where there is severe central and bilateral lateral  recess stenosis as well as moderate to severe left foraminal narrowing.  Thecal sac is compressed with nerve root stretched and redundant above this level.  Correlate for left L2 radiculopathy. Spondylotic degenerative changes are seen at remaining levels including L3-4 where there is moderate to severe central and lateral recess stenosis  Orders:  •  Ambulatory referral to Physical Therapy; Future    Lumbar spondylosis  Patient is under care of pain management at [facility].  He will discuss possibility of surgical consult with Dr. Castro  Orders:  •  Ambulatory referral to Physical Therapy; Future    Microalbuminuria due to type 2 diabetes mellitus  (HCC)    Lab Results   Component Value Date    HGBA1C 6.0 (H) 04/08/2025   Proceed with urine testing.  Hemoglobin A1c is excellent.  Continue ARB       Sickle cell trait (HCC)  Recent extensive evaluation by hematology at [facility], Dr. Stafford, May 2024  They recommend annual CBC monitoring by PCP unless hemoglobin is below 12         Constipation, unspecified constipation type  Intermittent symptoms of constipation with occasional lower abdominal bloating.  Abdominal exam is unremarkable, benign.  Patient reports normal appetite, no dyspepsia, stable weight.  Advised to start MiraLAX daily or daily.  If symptoms will not improve with regular BMs-patient should schedule follow-up with colorectal surgery, Dr. Reis.  He is due for routine colonoscopy in January 2026 but should proceed with earlier testing if symptoms of lower abdominal discomfort persist.       Hypertension with albuminuria  Blood pressure is well-controlled.  Continue chlorthalidone 12.5 mg daily, losartan 100 mg daily and amlodipine 10 mg daily           Preventive Screenings:  - Diabetes Screening: screening not indicated and has diabetes  - Cholesterol Screening: screening not indicated and has hyperlipidemia   - Hepatitis C screening: screening up-to-date   - Colon cancer  screening: screening up-to-date   - Lung cancer screening: screening not indicated   - Prostate cancer screening: screening not indicated     Immunizations:  - Immunizations due: Zoster (Shingrix)  - Risks/benefits immunizations discussed    - Immunizations given per orders      Counseling/Anticipatory Guidance:    - Sexual health: discussed sexually transmitted diseases, partner selection, use of condoms, avoidance of unintended pregnancy, and contraceptive alternatives.   - Diet: discussed recommendations for a healthy/well-balanced diet.        Patient Instructions   12-hour fasting blood work  Please schedule vascular screening  Please schedule follow-up evaluation of lower back pain with Dr. Castro and discuss surgical referral  Referral to physical therapy  Please try MiraLAX daily or every other day to achieve bowel movements on a regular basis.  If lower abdominal discomfort is persisting in spite of regular BMs-we should proceed with earlier colonoscopy.  Dr. Reis at [facility] colorectal surgery.  Your routine due date is January 2026.  Please message me with the update    Return in about 6 months (around 10/8/2025) for follow up.    History of Present Illness     Adult Annual Physical:  Patient presents for annual physical. Follow-up and annual well exam     Has been feeling stably   Evaluated by hematology at [place], sickle cell trait.    They recommend watchful monitoring, follow-up with hematology if hemoglobin is less than 12     Chronic low back pain.  Patient reports that legs feel shaky and weak with walking after some time, symptom does not occur on a daily basis.  He usually stops or sits to rest and legs feel better.  Left leg is worse than right.  Patient with history of chronic low back pain, multilevel disc disease stenosis and history of low back surgery.  Chronic left lower extremity sciatica.  Sleeping on the side.  No leg discoloration, visible edema or warmth.      Patient is under care  of pain management at [facility], Dr. Castro    The patient also reports intermittent lower abdominal discomfort, no pain.  Pressure type.  No dysuria.  Stable unchanged nocturia once or twice per night.  Bowel movements are regular, trending to be constipated.  Follows healthy diet with a lot of fruit and vegetables.  No Rx for constipation. Colonoscopy 1/2016-  normal - due in 10 years.    Patient oli under care of cardiologist at [facility].  He has been compliant with med regimen for hypertension, hyperlipidemia, type 2 diabetes.  No complaints of angina, dyspnea or palpitations.    .     Diet and Physical Activity:  - Diet/Nutrition: no special diet, heart healthy (low sodium) diet, limited junk food, low fat diet and intermittent fasting.  - Exercise: no formal exercise and less than 30 minutes on average.    Depression Screening:  - PHQ-2 Score: 0    General Health:  - Sleep: sleeps poorly, 4-6 hours of sleep on average and snores loudly.  - Hearing: decreased hearing right ear and decreased hearing left ear.  - Vision: most recent eye exam < 1 year ago and wears glasses.  - Dental: regular dental visits and brushes teeth twice daily.    /GYN Health:  - Follows with GYN: no.   - History of STDs: no     Health:  - History of STDs: no.   - Urinary symptoms: urinary frequency, urinary hesitancy and nocturia.     Advanced Care Planning:  - Has an advanced directive?: no    - Has a durable medical POA?: no      Review of Systems   Constitutional: Negative.    HENT: Negative.     Respiratory: Negative.     Cardiovascular: Negative.    Gastrointestinal:  Positive for abdominal distention and constipation.        As per HPI   Genitourinary: Negative.  Negative for dysuria and hematuria.        Nocturia once or twice per night   Musculoskeletal:  Positive for arthralgias, back pain and gait problem.   Skin: Negative.    Neurological:  Positive for weakness.        As per HPI, intermittent lower extremity  "weakness with ambulation   Psychiatric/Behavioral: Negative.       Medical History Reviewed by provider this encounter:  Tobacco  Allergies  Meds  Problems  Med Hx  Surg Hx  Fam Hx     .    Objective   /60 (BP Location: Left arm, Patient Position: Sitting, Cuff Size: Large)   Pulse 59   Temp 97.6 °F (36.4 °C) (Temporal)   Resp 16   Ht 5' 6\" (1.676 m)   Wt 85.2 kg (187 lb 12.8 oz)   SpO2 98%   BMI 30.31 kg/m²     Physical Exam  Vitals and nursing note reviewed.   Constitutional:       General: He is not in acute distress.     Appearance: Normal appearance. He is not ill-appearing.   HENT:      Head: Normocephalic and atraumatic.   Eyes:      General: No scleral icterus.     Conjunctiva/sclera: Conjunctivae normal.   Neck:      Vascular: No carotid bruit.   Cardiovascular:      Rate and Rhythm: Normal rate and regular rhythm.      Heart sounds: Normal heart sounds. No murmur heard.  Pulmonary:      Effort: Pulmonary effort is normal. No respiratory distress.      Breath sounds: Normal breath sounds.   Abdominal:      General: Abdomen is flat. Bowel sounds are normal. There is no distension.      Palpations: Abdomen is soft.      Tenderness: There is no abdominal tenderness. There is no guarding.      Hernia: No hernia is present.   Musculoskeletal:         General: No swelling. Normal range of motion.      Cervical back: Neck supple. No rigidity.      Right lower leg: No edema.      Left lower leg: No edema.   Skin:     General: Skin is warm.      Findings: No rash.   Neurological:      General: No focal deficit present.      Mental Status: He is alert and oriented to person, place, and time.   Psychiatric:         Mood and Affect: Mood normal.         Behavior: Behavior normal.         Thought Content: Thought content normal.         "

## 2025-04-08 NOTE — PATIENT INSTRUCTIONS
12-hour fasting blood work  Please schedule vascular screening  Please schedule follow-up evaluation of lower back pain with Dr. Castro and discuss surgical referral  Referral to physical therapy  Please try MiraLAX daily or every other day to achieve bowel movements on a regular basis.  If lower abdominal discomfort is persisting in spite of regular BMs-we should proceed with earlier colonoscopy.  Dr. Reis at [facility] colorectal surgery.  Your routine due date is January 2026.  Please message me with the update

## 2025-04-08 NOTE — ASSESSMENT & PLAN NOTE
Lab Results   Component Value Date    HGBA1C 6.0 (H) 04/08/2025   Good glycemic control.  Metformin ER 1000 mg daily.  Patient follows for routine diabetic eye exam  Proceed with complete blood work  Orders:  •  POCT hemoglobin A1c  •  CBC and differential; Future  •  Comprehensive metabolic panel; Future  •  Albumin / creatinine urine ratio; Future

## 2025-04-08 NOTE — ASSESSMENT & PLAN NOTE
Currently follows with pain management [facility], Dr. Castro   Symptoms of neurogenic claudication, left-sided   MRI 8/2022: spondylotic degenerative changes are seen diffusely most prominently at L2-3 where there is severe central and bilateral lateral recess stenosis as well as moderate to severe left foraminal narrowing.  Thecal sac is compressed with nerve root stretched and redundant above this level.  Correlate for left L2 radiculopathy. Spondylotic degenerative changes are seen at remaining levels including L3-4 where there is moderate to severe central and lateral recess stenosis  Orders:  •  Ambulatory referral to Physical Therapy; Future

## 2025-04-08 NOTE — ASSESSMENT & PLAN NOTE
Continue statin/rosuvastatin 10 mg daily.  Orders:  •  VAS screening; Future  •  Lipid Panel with Direct LDL reflex; Future  •  TSH, 3rd generation; Future

## 2025-04-09 LAB
EST. AVERAGE GLUCOSE BLD GHB EST-MCNC: 126 MG/DL
FERRITIN SERPL-MCNC: 110 NG/ML (ref 30–336)
FOLATE SERPL-MCNC: 13 NG/ML
HBA1C MFR BLD: 6 %
TSH SERPL DL<=0.05 MIU/L-ACNC: 3.28 UIU/ML (ref 0.45–4.5)
VIT B12 SERPL-MCNC: 351 PG/ML (ref 180–914)

## 2025-04-10 ENCOUNTER — RESULTS FOLLOW-UP (OUTPATIENT)
Dept: FAMILY MEDICINE CLINIC | Facility: CLINIC | Age: 75
End: 2025-04-10

## 2025-04-12 PROBLEM — B34.9 VIRAL SYNDROME: Status: RESOLVED | Noted: 2023-12-08 | Resolved: 2025-04-12

## 2025-04-12 PROBLEM — K59.00 CONSTIPATION: Status: ACTIVE | Noted: 2025-04-12

## 2025-04-12 NOTE — ASSESSMENT & PLAN NOTE
Blood pressure is well-controlled.  Continue chlorthalidone 12.5 mg daily, losartan 100 mg daily and amlodipine 10 mg daily

## 2025-04-12 NOTE — ASSESSMENT & PLAN NOTE
Intermittent symptoms of constipation with occasional lower abdominal bloating.  Abdominal exam is unremarkable, benign.  Patient reports normal appetite, no dyspepsia, stable weight.  Advised to start MiraLAX daily or daily.  If symptoms will not improve with regular BMs-patient should schedule follow-up with colorectal surgery, Dr. Reis.  He is due for routine colonoscopy in January 2026 but should proceed with earlier testing if symptoms of lower abdominal discomfort persist.

## 2025-04-12 NOTE — ASSESSMENT & PLAN NOTE
Lab Results   Component Value Date    HGBA1C 6.0 (H) 04/08/2025   Proceed with urine testing.  Hemoglobin A1c is excellent.  Continue ARB

## 2025-04-12 NOTE — ASSESSMENT & PLAN NOTE
Recent extensive evaluation by hematology at [facility], Dr. Stafford, May 2024  They recommend annual CBC monitoring by PCP unless hemoglobin is below 12

## 2025-04-14 ENCOUNTER — HOSPITAL ENCOUNTER (OUTPATIENT)
Dept: NON INVASIVE DIAGNOSTICS | Facility: CLINIC | Age: 75
Discharge: HOME | End: 2025-04-14
Payer: COMMERCIAL

## 2025-04-14 ENCOUNTER — PATIENT MESSAGE (OUTPATIENT)
Dept: FAMILY MEDICINE CLINIC | Facility: CLINIC | Age: 75
End: 2025-04-14

## 2025-04-14 DIAGNOSIS — I73.9 CLAUDICATION (HCC): ICD-10-CM

## 2025-04-14 DIAGNOSIS — M48.062 SPINAL STENOSIS OF LUMBAR REGION WITH NEUROGENIC CLAUDICATION: Primary | ICD-10-CM

## 2025-04-14 DIAGNOSIS — M54.16 LUMBAR RADICULOPATHY: ICD-10-CM

## 2025-04-14 DIAGNOSIS — M47.816 LUMBAR SPONDYLOSIS: ICD-10-CM

## 2025-04-14 DIAGNOSIS — I44.7 COMPLETE LEFT BUNDLE BRANCH BLOCK (LBBB): ICD-10-CM

## 2025-04-14 DIAGNOSIS — E78.2 MIXED HYPERLIPIDEMIA: Chronic | ICD-10-CM

## 2025-04-14 PROCEDURE — 93978 VASCULAR STUDY: CPT | Performed by: SURGERY

## 2025-04-14 PROCEDURE — 93922 UPR/L XTREMITY ART 2 LEVELS: CPT

## 2025-04-14 PROCEDURE — 93880 EXTRACRANIAL BILAT STUDY: CPT | Performed by: SURGERY

## 2025-04-14 PROCEDURE — 93922 UPR/L XTREMITY ART 2 LEVELS: CPT | Performed by: SURGERY

## 2025-04-14 NOTE — PATIENT COMMUNICATION
Pt called to confirm that he got the message from [employer]. Was advised by Dr Yousif to see neurosurgeon at [facility]. They're requesting an updated MRI and he can do it at [facility].    Previous order was for  MRI lumbar spine wo contrast

## 2025-04-14 NOTE — PATIENT COMMUNICATION
Called patient and leave a detailed with BW results and provider message/instructions. [employer] message created and sent.

## 2025-04-15 ENCOUNTER — RESULTS FOLLOW-UP (OUTPATIENT)
Dept: FAMILY MEDICINE CLINIC | Facility: CLINIC | Age: 75
End: 2025-04-15

## 2025-04-17 ENCOUNTER — EVALUATION (OUTPATIENT)
Dept: PHYSICAL THERAPY | Facility: REHABILITATION | Age: 75
End: 2025-04-17
Payer: COMMERCIAL

## 2025-04-17 DIAGNOSIS — M48.062 SPINAL STENOSIS OF LUMBAR REGION WITH NEUROGENIC CLAUDICATION: ICD-10-CM

## 2025-04-17 DIAGNOSIS — I73.9 CLAUDICATION (HCC): ICD-10-CM

## 2025-04-17 DIAGNOSIS — M47.816 LUMBAR SPONDYLOSIS: ICD-10-CM

## 2025-04-17 PROCEDURE — 97140 MANUAL THERAPY 1/> REGIONS: CPT | Performed by: PHYSICAL THERAPIST

## 2025-04-17 PROCEDURE — 97162 PT EVAL MOD COMPLEX 30 MIN: CPT | Performed by: PHYSICAL THERAPIST

## 2025-04-17 NOTE — PROGRESS NOTES
PT Evaluation     Today's date: 2025  Patient name: Marlon Lechuga  : 1950  MRN: [id number]  Referring provider: Kecia Gutierrez MD  Dx:   Encounter Diagnosis     ICD-10-CM    1. Claudication (HCC)  I73.9 Ambulatory referral to Physical Therapy      2. Spinal stenosis of lumbar region with neurogenic claudication  M48.062 Ambulatory referral to Physical Therapy      3. Lumbar spondylosis  M47.816 Ambulatory referral to Physical Therapy            Start Time: 1155  Stop Time: 1230  Total time in clinic (min): 35 minutes    Assessment  Impairments: abnormal gait, abnormal or restricted ROM, activity intolerance, impaired physical strength, lacks appropriate home exercise program and pain with function  Symptom irritability: high    Assessment details: Marlon Lechuga is a 75 y.o. male who presents to therapy for chronic low back pain with prior MRI findings showing lumbar stenosis.  Sx's increases in with prolonged standing, walking and lumbar ext and improve with flexion and sitting. 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 Pt will benefit from skilled outpatient PT to address the below stated impairments, to address therapy goals, to reduce pain, and improve function.       Therapist explained to pt: findings of IE, rehab diagnosis, and POC. Pt-centered goals reviewed and confirmed by pt. Instruction also given for HEP. Pt expressed verbal agreement and understanding and verified understanding via teach back method. Pt also expressed satisfaction that their current concerns were addressed at the end of the session.    Barriers to therapy: None   Understanding of Dx/Px/POC: good     Prognosis: good    Goals  Short term goals: to be met in 3-4 weeks  Pt independent with initial HEP, rationale, technique and frequency, for ROM and pain control.  Pt will report at least a 25% reduction in subjective pain complaints/symptoms to better manage ADLs and household  chores.   Improve max pain level by 2 points on the numeric pain rating scale indicating a clinically significant reduction in pain level.  Pt will achieve an improvement in FOTO score indicating an improvement in pain and function.    Long term goals: to be met in 6-8 weeks  Pt will report a 50% or > reduction in subjective pain complaints/symptoms to better manage ADLs and household chores.  Pt will improve FOTO score further indicating an overall improvement in pain and function   Pt will be able to perform ADLs, iADLS, and work duties with a max pain level of 5/10 for overall reduction in pain.  Pt independent with rationale, technique and plan for performance of advanced HEP to ensure independent self-management of symptoms upon discharge.      Plan  Patient would benefit from: skilled physical therapy  Planned modality interventions: TENS, thermotherapy: hydrocollator packs, traction, ultrasound, cryotherapy and low level laser therapy    Planned therapy interventions: body mechanics training, therapeutic training, therapeutic exercise, therapeutic activities, stretching, strengthening, neuromuscular re-education, patient education, home exercise program, functional ROM exercises, flexibility, manual therapy, Acevedo taping, joint mobilization and balance    Frequency: 1-2x/week.  Duration in weeks: 12  Treatment plan discussed with: patient        Subjective Evaluation    History of Present Illness  Mechanism of injury: Pt presents for chronic low back pain. Had MRI in the past and was told L1 is affecting his L2-3 area. Had injection Aug of last summer which helped but then the pain returned.    MRI done 8/8/22: Spondylotic degenerative changes are seen diffusely most prominently at L2-3 where there is severe central and bilateral lateral recess stenosis as well as moderate to severe left foraminal narrowing.  Thecal sac is compressed with nerve root stretched and redundant above this level.  Correlate for  left L2 radiculopathy. Spondylotic degenerative changes are seen at remaining levels including L3-4 where there is moderate to severe central and lateral recess stenosis.      Does report some pain down the legs when he walks a 1/2 mile or less. Feels his legs start shaking which is the bigger limitation. Pt is going for another MRI and down to [place] for a neurosurgery consult. Does have a little pain in the low back when sitting. Standing and walking is very painful. Cannot stand straight. With standing, will get pain all the way to the feet, L>R. Also getting n/t in B legs. No b/b changes. Does report difficulty sleeping due to the pain. Cannot sleep on his back. Has trouble finding a comfortable position. Still working as a professor.   Patient Goals  Patient goals for therapy: decreased pain, return to sport/leisure activities, return to work, independence with ADLs/IADLs, increased strength and increased motion  Patient goal: relieve pain  Pain  Current pain rating: 3  At best pain ratin  At worst pain ratin  Location: down BLE  Quality: burning, radiating and sharp (electrical shock)  Alleviating factors: sitting.  Aggravating factors: walking and standing  Progression: worsening    Social Support  Lives in: multiple-level home  Lives with: spouse    Employment status: working (professor)    Diagnostic Tests  MRI studies: abnormal  Treatments  Previous treatment: physical therapy and medication        Objective    Objective: See treatment diary below    Lumbar AROM  Lumbar flex: hands to floor, reduces pain   Lumbar ext: unable to ext, increases pain  Lumbar R rotation WNL  Lumbar L rotation WNL    Hip ROM: mildly limited hip flexion ROM bilat, limited hip ER on the R       Lower quarter screen  LE reflexes   - Patellar and achilles absent bilat    - Neg clonus BLE   - LE sensation: dec LLE L1-3    (+) slump LLE and SLR  (-) ERICA bilat  (-) FADIR bilat  LE MMT grossly 5/5 throughout, except L hip  flexion 4/5    Palpation  Increased tone lumbar paraspinals  R>L with TTP     Gait:  Ambulates with flexed hips/slight forward trunk flexion         Precautions: HTN, hx of back surgery    FOTO  4/17 = 50/57    POC exp: 7/10/25      Manuals 4/17            S/l lumbar opening mob with STM DV bilat            Cupping lumbar parapsinals                                       Neuro Re-Ed                                                                                                        Ther Ex             VG for LE strength and ROM             key pose stretch with PT OP             Man hip flexor stretch                                                                                           Ther Activity                                       Gait Training                                       Modalities

## 2025-04-22 ENCOUNTER — OFFICE VISIT (OUTPATIENT)
Dept: PHYSICAL THERAPY | Facility: REHABILITATION | Age: 75
End: 2025-04-22
Attending: FAMILY MEDICINE
Payer: COMMERCIAL

## 2025-04-22 DIAGNOSIS — E66.9 TYPE 2 DIABETES MELLITUS WITH OBESITY  (HCC): ICD-10-CM

## 2025-04-22 DIAGNOSIS — M47.816 LUMBAR SPONDYLOSIS: ICD-10-CM

## 2025-04-22 DIAGNOSIS — E11.69 TYPE 2 DIABETES MELLITUS WITH OBESITY  (HCC): ICD-10-CM

## 2025-04-22 DIAGNOSIS — M48.062 SPINAL STENOSIS OF LUMBAR REGION WITH NEUROGENIC CLAUDICATION: ICD-10-CM

## 2025-04-22 DIAGNOSIS — I73.9 CLAUDICATION (HCC): Primary | ICD-10-CM

## 2025-04-22 PROCEDURE — 97140 MANUAL THERAPY 1/> REGIONS: CPT | Performed by: PHYSICAL THERAPIST

## 2025-04-22 PROCEDURE — 97110 THERAPEUTIC EXERCISES: CPT | Performed by: PHYSICAL THERAPIST

## 2025-04-22 NOTE — PROGRESS NOTES
Daily Note     Today's date: 2025  Patient name: Marlon Lechuga  : 1950  MRN: [id number]  Referring provider: Kecia Gutierrez MD  Dx:   Encounter Diagnosis     ICD-10-CM    1. Claudication (HCC)  I73.9       2. Spinal stenosis of lumbar region with neurogenic claudication  M48.062       3. Lumbar spondylosis  M47.816           Start Time: 0800  Stop Time: 0850  Total time in clinic (min): 50 minutes    Subjective: Reports no change from IE      Objective: See treatment diary below      Assessment: Tolerated treatment well. Patient demonstrated fatigue post treatment, exhibited good technique with therapeutic exercises, and would benefit from continued PT         Plan: Continue per plan of care.  Progress treatment as tolerated.      I have directly supervised and participated in the care of this patient with the therapy student, Kacey Mendoza, SPT. I agree with the details as outlined above as well as during today's session. Melany Painter, PT, DPT        Precautions: HTN, hx of back surgery    FOTO   = 50/57    POC exp: 7/10/25      Manuals            S/l lumbar opening mob with STM DV bilat DV/AS           Cupping lumbar parapsinals             Lumbar flexion mobs  DV/AS                        Neuro Re-Ed                                                                                                        Ther Ex             bike for LE strength and ROM  x15           key pose stretch with PT OP  x10           Man hip flexor stretch             Seated lumbar flexion stretch  x25           Supine hamstring stretcch  X3' ea man - DV                                                               Ther Activity                                       Gait Training                                       Modalities

## 2025-04-25 ENCOUNTER — OFFICE VISIT (OUTPATIENT)
Dept: PHYSICAL THERAPY | Facility: REHABILITATION | Age: 75
End: 2025-04-25
Attending: FAMILY MEDICINE
Payer: COMMERCIAL

## 2025-04-25 DIAGNOSIS — I73.9 CLAUDICATION (HCC): Primary | ICD-10-CM

## 2025-04-25 DIAGNOSIS — M48.062 SPINAL STENOSIS OF LUMBAR REGION WITH NEUROGENIC CLAUDICATION: ICD-10-CM

## 2025-04-25 DIAGNOSIS — M47.816 LUMBAR SPONDYLOSIS: ICD-10-CM

## 2025-04-25 PROCEDURE — 97110 THERAPEUTIC EXERCISES: CPT | Performed by: PHYSICAL THERAPIST

## 2025-04-25 PROCEDURE — 97140 MANUAL THERAPY 1/> REGIONS: CPT | Performed by: PHYSICAL THERAPIST

## 2025-04-25 NOTE — PROGRESS NOTES
"Daily Note     Today's date: 2025  Patient name: Marlon Lechuga  : 1950  MRN: [id number]  Referring provider: Kecia Gutierrez MD  Dx:   Encounter Diagnosis     ICD-10-CM    1. Claudication (HCC)  I73.9       2. Spinal stenosis of lumbar region with neurogenic claudication  M48.062       3. Lumbar spondylosis  M47.816           Start Time: 0702  Stop Time: 0800  Total time in clinic (min): 58 minutes    Subjective: Pt reports no changes since last session.       Objective: See treatment diary below      Assessment: Patient tolerated therapy well. Patient reported relief following manual therapy techniques. Pt did not respond as well to hip flexor lunge stretch despite verbal cueing; he was unable to achieve a good stretch without low back pain. Discussed POC objectives and to potentially add stability exercises next session. Patient would benefit from continuing physical therapy to address limitations in mobility, stability, strength, and functional movements in efforts to promote more independence with patient's daily activities.         Plan: Continue per plan of care.     I have directly supervised and participated in the care of this patient with the therapy student, Kacey Mendoza, SPT. I agree with the details as outlined above as well as during today's session. Melany Painter, PT, DPT          Precautions: HTN, hx of back surgery    FOTO   = 50/57    POC exp: 7/10/25      Manuals           S/l lumbar opening mob with STM DV bilat DV/AS AS          Cupping lumbar parapsinals             Lumbar flexion mobs  DV/AS AS          STM   paraspinals          Neuro Re-Ed                                                                                                        Ther Ex             bike for LE strength and ROM  x15 x15          key pose stretch with PT OP  x10 x10          Man hip flexor stretch             Seated lumbar flexion stretch  x25 15x 10\"          Supine " "hamstring stretcch  X3' ea man - DV           Cat/camel   Camel flexion only x10 10\"          Seated Pball press down   x3          Lunge hip flexor stretch   X5 ea                       Ther Activity                                       Gait Training                                       Modalities                                              "

## 2025-04-28 ENCOUNTER — OFFICE VISIT (OUTPATIENT)
Dept: PHYSICAL THERAPY | Facility: REHABILITATION | Age: 75
End: 2025-04-28
Attending: FAMILY MEDICINE
Payer: COMMERCIAL

## 2025-04-28 DIAGNOSIS — I73.9 CLAUDICATION (HCC): Primary | ICD-10-CM

## 2025-04-28 DIAGNOSIS — M48.062 SPINAL STENOSIS OF LUMBAR REGION WITH NEUROGENIC CLAUDICATION: ICD-10-CM

## 2025-04-28 DIAGNOSIS — M47.816 LUMBAR SPONDYLOSIS: ICD-10-CM

## 2025-04-28 PROCEDURE — 97110 THERAPEUTIC EXERCISES: CPT | Performed by: PHYSICAL THERAPIST

## 2025-04-28 PROCEDURE — 97140 MANUAL THERAPY 1/> REGIONS: CPT | Performed by: PHYSICAL THERAPIST

## 2025-04-28 NOTE — PROGRESS NOTES
"Daily Note     Today's date: 2025  Patient name: Marlon Lechuga  : 1950  MRN: [id number]  Referring provider: Kecia Gutierrez MD  Dx:   Encounter Diagnosis     ICD-10-CM    1. Claudication (HCC)  I73.9       2. Spinal stenosis of lumbar region with neurogenic claudication  M48.062       3. Lumbar spondylosis  M47.816           Start Time: 09  Stop Time: 1015  Total time in clinic (min): 45 minutes    Subjective: Pt offers no new complaints.      Objective: See treatment diary below      Assessment: Pt had difficulty achieving lumbar flexion in quadruped, but with cues was able to achieve correct form. He was most challenged by this exercise and noted some core fatigued post intervention.  Pt will benefit from continued skilled outpatient PT to improve strength, to address therapy goals, to reduce pain, and improve function.        Plan: Continue per plan of care.          Precautions: HTN, hx of back surgery    FOTO   = 50/57    POC exp: 7/10/25      Manuals          S/l lumbar opening mob with STM DV bilat DV/AS AS DV         Cupping lumbar parapsinals             Lumbar flexion mobs  DV/AS AS DV         STM   paraspinals DV         Neuro Re-Ed                                                                                                        Ther Ex             bike for LE strength and ROM  x15 x15 15'         key pose stretch with PT OP  x10 x10          Man hip flexor stretch             Seated lumbar flexion stretch  x25 15x 10\" x15         Supine hamstring stretcch  X3' ea man - DV  3' ea         Cat/camel   Camel flexion only x10 10\" Camel flexion only x10 10\"         Seated Pball press down   x3          Lunge hip flexor stretch   X5 ea                       Ther Activity                                       Gait Training                                       Modalities                                              "

## 2025-05-02 ENCOUNTER — OFFICE VISIT (OUTPATIENT)
Dept: PHYSICAL THERAPY | Facility: REHABILITATION | Age: 75
End: 2025-05-02
Attending: FAMILY MEDICINE
Payer: COMMERCIAL

## 2025-05-02 DIAGNOSIS — M48.062 SPINAL STENOSIS OF LUMBAR REGION WITH NEUROGENIC CLAUDICATION: ICD-10-CM

## 2025-05-02 DIAGNOSIS — M47.816 LUMBAR SPONDYLOSIS: ICD-10-CM

## 2025-05-02 DIAGNOSIS — I73.9 CLAUDICATION (HCC): Primary | ICD-10-CM

## 2025-05-02 PROCEDURE — 97140 MANUAL THERAPY 1/> REGIONS: CPT | Performed by: PHYSICAL THERAPIST

## 2025-05-02 PROCEDURE — 97110 THERAPEUTIC EXERCISES: CPT | Performed by: PHYSICAL THERAPIST

## 2025-05-02 NOTE — PROGRESS NOTES
"Daily Note     Today's date: 2025  Patient name: Marlon Lechuga  : 1950  MRN: [id number]  Referring provider: Kecia Gutierrez MD  Dx:   Encounter Diagnosis     ICD-10-CM    1. Claudication (HCC)  I73.9       2. Spinal stenosis of lumbar region with neurogenic claudication  M48.062       3. Lumbar spondylosis  M47.816           Start Time: 831  Stop Time: 922  Total time in clinic (min): 51 minutes    Subjective: Pt reports his insurance is requiring 10 PT session before they will approve an MRI      Objective: See treatment diary below      Assessment: Pt responded well to tx, but has minimal carryover in pain reduction from session to session.  Pt will benefit from continued skilled outpatient PT to improve strength, to address therapy goals, to reduce pain, and improve function.        Plan: Continue per plan of care.          Precautions: HTN, hx of back surgery    FOTO   = 50/57    POC exp: 7/10/25      Manuals  5/        S/l lumbar opening mob with STM DV bilat DV/AS AS DV DV        Cupping lumbar parapsinals             Lumbar flexion mobs  DV/AS AS DV DV        STM   paraspinals DV DV        Neuro Re-Ed                                                                                                        Ther Ex             bike for LE strength and ROM  x15 x15 15' 15'        key pose stretch with PT OP  x10 x10  x10        Man hip flexor stretch             Seated lumbar flexion stretch  x25 15x 10\" x15 x10        Supine hamstring stretch  X3' ea man - DV  3' ea 2' ea        Cat/camel   Camel flexion only x10 10\" Camel flexion only x10 10\"         Seated Pball press down   x3          Lunge hip flexor stretch   X5 ea                       Ther Activity                                       Gait Training                                       Modalities                                              "

## 2025-05-05 ENCOUNTER — OFFICE VISIT (OUTPATIENT)
Dept: PHYSICAL THERAPY | Facility: REHABILITATION | Age: 75
End: 2025-05-05
Attending: FAMILY MEDICINE
Payer: COMMERCIAL

## 2025-05-05 DIAGNOSIS — I73.9 CLAUDICATION (HCC): Primary | ICD-10-CM

## 2025-05-05 DIAGNOSIS — M48.062 SPINAL STENOSIS OF LUMBAR REGION WITH NEUROGENIC CLAUDICATION: ICD-10-CM

## 2025-05-05 DIAGNOSIS — M47.816 LUMBAR SPONDYLOSIS: ICD-10-CM

## 2025-05-05 PROCEDURE — 97140 MANUAL THERAPY 1/> REGIONS: CPT | Performed by: PHYSICAL THERAPIST

## 2025-05-05 PROCEDURE — 97110 THERAPEUTIC EXERCISES: CPT | Performed by: PHYSICAL THERAPIST

## 2025-05-05 NOTE — PROGRESS NOTES
Daily Note     Today's date: 2025  Patient name: Marlon Lechuga  : 1950  MRN: [id number]  Referring provider: Kecia Gutierrez MD  Dx:   Encounter Diagnosis     ICD-10-CM    1. Claudication (HCC)  I73.9       2. Spinal stenosis of lumbar region with neurogenic claudication  M48.062       3. Lumbar spondylosis  M47.816           Start Time: 1405  Stop Time: 1450  Total time in clinic (min): 45 minutes    Subjective: Pt reports no change in back pain.       Objective: See treatment diary below      Assessment: Patient tolerated therapy well as evidence by performing all interventions without limitation. Patient demonstrated improvement in thoracic/lumbar spine mobility with exercises. Patient educated on HEP to improve flexibility and decrease pain. Patient would benefit from continuing physical therapy to address limitations in mobility, stability, strength, and functional movements in efforts to promote more independence with patient's daily activities.         Plan: Continue per plan of care.     I have directly supervised and participated in the care of this patient with the therapy student, Kacey Mendoza SPT. I agree with the details as outlined above as well as during today's session. Melany Painter, PT, DPT        Precautions: HTN, hx of back surgery    FOTO   = 50/57    POC exp: 7/10/25    Access Code: [email or web address]  URL: [email or web address]  Date: 2025  Prepared by: Melany Manuel    Exercises  - Lateral Shift Correction at Wall (Mirrored)  - 1 x daily - 7 x weekly - 3 sets - 10 reps  - Thoracic Sidebending with Towel Roll  - 1 x daily - 7 x weekly - 3 sets - 10 reps    Manuals        S/l lumbar opening mob with STM DV bilat DV/AS AS DV DV AS/DV       Cupping lumbar parapsinals             Lumbar flexion mobs  DV/AS AS DV DV AS/DV       STM   paraspinals DV DV AS       Neuro Re-Ed                                                                "                                         Ther Ex      5/5       bike for LE strength and ROM  x15 x15 15' 15' 10'       key pose stretch with PT OP  x10 x10  x10 10x 15\"       Man hip flexor stretch             Seated lumbar flexion stretch  x25 15x 10\" x15 x10        Supine hamstring stretch  X3' ea man - DV  3' ea 2' ea DV       Cat/camel   Camel flexion only x10 10\" Camel flexion only x10 10\"  Camel flexion only x10 10\"       Seated Pball press down   x3          Lunge hip flexor stretch   X5 ea          Sidelying QL stretch with bolster      HEP       Standing QL stretch       HEP       Ther Activity                                       Gait Training                                       Modalities                                                "

## 2025-05-08 ENCOUNTER — OFFICE VISIT (OUTPATIENT)
Dept: PHYSICAL THERAPY | Facility: REHABILITATION | Age: 75
End: 2025-05-08
Attending: FAMILY MEDICINE
Payer: COMMERCIAL

## 2025-05-08 DIAGNOSIS — M47.816 LUMBAR SPONDYLOSIS: ICD-10-CM

## 2025-05-08 DIAGNOSIS — I73.9 CLAUDICATION (HCC): Primary | ICD-10-CM

## 2025-05-08 DIAGNOSIS — M48.062 SPINAL STENOSIS OF LUMBAR REGION WITH NEUROGENIC CLAUDICATION: ICD-10-CM

## 2025-05-08 PROCEDURE — 97110 THERAPEUTIC EXERCISES: CPT | Performed by: PHYSICAL THERAPIST

## 2025-05-08 PROCEDURE — 97140 MANUAL THERAPY 1/> REGIONS: CPT | Performed by: PHYSICAL THERAPIST

## 2025-05-08 NOTE — PROGRESS NOTES
Daily Note     Today's date: 2025  Patient name: Marlon Lechuga  : 1950  MRN: [id number]  Referring provider: Kecia Gutierrez MD  Dx:   Encounter Diagnosis     ICD-10-CM    1. Claudication (HCC)  I73.9       2. Spinal stenosis of lumbar region with neurogenic claudication  M48.062       3. Lumbar spondylosis  M47.816           Start Time: 1355  Stop Time: 1440  Total time in clinic (min): 45 minutes    Subjective: Pt reports no change in symptoms. States HEP helps relieve his back pain.      Objective: See treatment diary below      Assessment: Patient tolerated therapy well as evidence by performing all interventions without limitation. Patient demonstrated improved understanding of HEP by performing all exercises correctly. Patient presents with left lateral shift in the t/s and l/s, and provided repeated lateral glides to promote better alignment of the spine. Added lateral glides to HEP to address patient's concern over lateral shift. Patient would benefit from continuing physical therapy to address limitations in mobility, stability, strength, and functional movements in efforts to promote more independence with patient's daily activities.        Plan: Continue per plan of care.     I have directly supervised and participated in the care of this patient with the therapy student, JENNY Barry. I agree with the details as outlined above as well as during today's session. Melany Painter, PT, DPT        Precautions: HTN, hx of back surgery    FOTO   = 50/57    POC exp: 7/10/25    Access Code: [email or web address]  URL: [email or web address]  Date: 2025  Prepared by: Melany Manuel    Exercises  - Lateral Shift Correction at Wall (Mirrored)  - 1 x daily - 7 x weekly - 3 sets - 10 reps  - Thoracic Sidebending with Towel Roll  - 1 x daily - 7 x weekly - 3 sets - 10 reps    Manuals  5/8      S/l lumbar opening mob with STM DV bilat DV/AS AS DV DV AS/DV AS   "    Cupping lumbar parapsinals             Lumbar flexion mobs  DV/AS AS DV DV AS/DV AS      STM   paraspinals DV DV AS       Neuro Re-Ed                                                                                                        Ther Ex      5/5 5/8      bike for LE strength and ROM  x15 x15 15' 15' 10' 10'      key pose stretch with PT OP  x10 x10  x10 10x 15\" 10x10\"      Man hip flexor stretch             Seated lumbar flexion stretch  x25 15x 10\" x15 x10        Supine hamstring stretch  X3' ea man - DV  3' ea 2' ea DV       Cat/camel   Camel flexion only x10 10\" Camel flexion only x10 10\"  Camel flexion only x10 10\" Camel flexion only x25 10\"      Seated Pball press down   x3          Lunge hip flexor stretch   X5 ea          Sidelying QL stretch with bolster      HEP       Standing QL stretch       HEP       Standing Lateral glides - shift correction       x20                   Ther Activity                                       Gait Training                                       Modalities                                                  "

## 2025-05-12 ENCOUNTER — OFFICE VISIT (OUTPATIENT)
Dept: PHYSICAL THERAPY | Facility: REHABILITATION | Age: 75
End: 2025-05-12
Attending: FAMILY MEDICINE
Payer: COMMERCIAL

## 2025-05-12 DIAGNOSIS — M47.816 LUMBAR SPONDYLOSIS: ICD-10-CM

## 2025-05-12 DIAGNOSIS — M48.062 SPINAL STENOSIS OF LUMBAR REGION WITH NEUROGENIC CLAUDICATION: ICD-10-CM

## 2025-05-12 DIAGNOSIS — I73.9 CLAUDICATION (HCC): Primary | ICD-10-CM

## 2025-05-12 PROCEDURE — 97110 THERAPEUTIC EXERCISES: CPT | Performed by: PHYSICAL THERAPIST

## 2025-05-12 PROCEDURE — 97140 MANUAL THERAPY 1/> REGIONS: CPT | Performed by: PHYSICAL THERAPIST

## 2025-05-12 NOTE — PROGRESS NOTES
"Daily Note     Today's date: 2025  Patient name: Marlon Lechuga  : 1950  MRN: [id number]  Referring provider: Kecia Gutierrez MD  Dx:   Encounter Diagnosis     ICD-10-CM    1. Claudication (HCC)  I73.9       2. Spinal stenosis of lumbar region with neurogenic claudication  M48.062       3. Lumbar spondylosis  M47.816           Start Time: 1447  Stop Time: 1530  Total time in clinic (min): 43 minutes    Subjective: Pt reports nothing new.      Objective: See treatment diary below      Assessment: Pt responded well to tx but carryover in pain reduction is limited. Pt is waiting on MRI approval from insurance before surgical consult.      Plan: Continue per plan of care.          Precautions: HTN, hx of back surgery    FOTO   = 50/57    POC exp: 7/10/25    Access Code: [email or web address]  URL: [email or web address]  Date: 2025  Prepared by: Melany Manuel    Exercises  - Lateral Shift Correction at Wall (Mirrored)  - 1 x daily - 7 x weekly - 3 sets - 10 reps  - Thoracic Sidebending with Towel Roll  - 1 x daily - 7 x weekly - 3 sets - 10 reps    Manuals      S/l lumbar opening mob with STM DV bilat DV/AS AS DV DV AS/DV AS DV     Cupping lumbar parapsinals                          Lumbar flexion mobs  DV/AS AS DV DV AS/DV AS DV     STM   paraspinals DV DV AS       Neuro Re-Ed                                                                                                        Ther Ex            bike for LE strength and ROM  x15 x15 15' 15' 10' 10' VG L7 10'     key pose stretch with PT OP  x10 x10  x10 10x 15\" 10x10\" 10\"x10     Man hip flexor stretch             Seated lumbar flexion stretch  x25 15x 10\" x15 x10   x30     Supine hamstring stretch  X3' ea man - DV  3' ea 2' ea DV  DV     Cat/camel   Camel flexion only x10 10\" Camel flexion only x10 10\"  Camel flexion only x10 10\" Camel flexion only x25 10\"      Seated Pball press down   x3    "       Lunge hip flexor stretch   X5 ea          Sidelying QL stretch with bolster      HEP       Standing QL stretch       HEP       Standing Lateral glides - shift correction       x20                   Ther Activity                                       Gait Training                                       Modalities

## 2025-05-15 ENCOUNTER — OFFICE VISIT (OUTPATIENT)
Dept: PHYSICAL THERAPY | Facility: REHABILITATION | Age: 75
End: 2025-05-15
Attending: FAMILY MEDICINE
Payer: COMMERCIAL

## 2025-05-15 DIAGNOSIS — M48.062 SPINAL STENOSIS OF LUMBAR REGION WITH NEUROGENIC CLAUDICATION: ICD-10-CM

## 2025-05-15 DIAGNOSIS — M47.816 LUMBAR SPONDYLOSIS: ICD-10-CM

## 2025-05-15 DIAGNOSIS — E66.9 TYPE 2 DIABETES MELLITUS WITH OBESITY  (HCC): ICD-10-CM

## 2025-05-15 DIAGNOSIS — E11.69 TYPE 2 DIABETES MELLITUS WITH OBESITY  (HCC): ICD-10-CM

## 2025-05-15 DIAGNOSIS — I73.9 CLAUDICATION (HCC): Primary | ICD-10-CM

## 2025-05-15 PROCEDURE — 97110 THERAPEUTIC EXERCISES: CPT | Performed by: PHYSICAL THERAPIST

## 2025-05-15 PROCEDURE — 97140 MANUAL THERAPY 1/> REGIONS: CPT | Performed by: PHYSICAL THERAPIST

## 2025-05-15 RX ORDER — METFORMIN HYDROCHLORIDE 500 MG/1
TABLET, EXTENDED RELEASE ORAL
Qty: 180 TABLET | Refills: 1 | Status: SHIPPED | OUTPATIENT
Start: 2025-05-15

## 2025-05-15 NOTE — PROGRESS NOTES
"Daily Note     Today's date: 5/15/2025  Patient name: Marlon Lechuga  : 1950  MRN: [id number]  Referring provider: Kecia Gutierrez MD  Dx:   Encounter Diagnosis     ICD-10-CM    1. Claudication (HCC)  I73.9       2. Spinal stenosis of lumbar region with neurogenic claudication  M48.062       3. Lumbar spondylosis  M47.816           Start Time: 1409  Stop Time: 1449  Total time in clinic (min): 40 minutes    Subjective: Pt reports no real change.      Objective: See treatment diary below      Assessment: Pt completed exercises without complaints. He responds well to flexion based exercises, but shen returns with standing and walking.       Plan: Continue per plan of care.          Precautions: HTN, hx of back surgery    FOTO   = 50/57    POC exp: 7/10/25    Access Code: [email or web address]  URL: [email or web address]  Date: 2025  Prepared by: Melany Manuel    Exercises  - Lateral Shift Correction at Wall (Mirrored)  - 1 x daily - 7 x weekly - 3 sets - 10 reps  - Thoracic Sidebending with Towel Roll  - 1 x daily - 7 x weekly - 3 sets - 10 reps    Manuals 4/17 4/22 4/24 4/28 5/2 5/5 5/8 5/12 5/15    S/l lumbar opening mob with STM DV bilat DV/AS AS DV DV AS/DV AS DV DV    Cupping lumbar parapsinals                          Lumbar flexion mobs  DV/AS AS DV DV AS/DV AS DV DV    STM   paraspinals DV DV AS       Neuro Re-Ed                                                                                                        Ther Ex            bike for LE strength and ROM  x15 x15 15' 15' 10' 10' VG L7 10'     key pose stretch with PT OP  x10 x10  x10 10x 15\" 10x10\" 10\"x10 10\"x10    Man hip flexor stretch             Seated lumbar flexion stretch  x25 15x 10\" x15 x10   x30 x30    Supine hamstring stretch  X3' ea man - DV  3' ea 2' ea DV  DV DV    Cat/camel   Camel flexion only x10 10\" Camel flexion only x10 10\"  Camel flexion only x10 10\" Camel flexion only x25 10\"      Seated Pball " press down   x3          Lunge hip flexor stretch   X5 ea          Sidelying QL stretch with bolster      HEP       Standing QL stretch       HEP       Standing Lateral glides - shift correction       x20                   Ther Activity                                       Gait Training                                       Modalities

## 2025-05-21 ENCOUNTER — TELEPHONE (OUTPATIENT)
Age: 75
End: 2025-05-21

## 2025-05-21 NOTE — TELEPHONE ENCOUNTER
Patient states his insurance wouldn't cover his MRI due to needing 10 sessions of physical therapy first. He has completed the PT and would like to proceed with getting an MRI. He is requesting a call back with what Dr Gutierrez suggest he do next. Please advise

## 2025-05-21 NOTE — TELEPHONE ENCOUNTER
Left detailed message for patient, to schedule appointment for mri once scheduled we can get prior authorization for it

## 2025-05-22 ENCOUNTER — OFFICE VISIT (OUTPATIENT)
Dept: PHYSICAL THERAPY | Facility: REHABILITATION | Age: 75
End: 2025-05-22
Attending: FAMILY MEDICINE
Payer: COMMERCIAL

## 2025-05-22 DIAGNOSIS — M47.816 LUMBAR SPONDYLOSIS: ICD-10-CM

## 2025-05-22 DIAGNOSIS — I73.9 CLAUDICATION (HCC): Primary | ICD-10-CM

## 2025-05-22 DIAGNOSIS — M48.062 SPINAL STENOSIS OF LUMBAR REGION WITH NEUROGENIC CLAUDICATION: ICD-10-CM

## 2025-05-22 PROCEDURE — 97110 THERAPEUTIC EXERCISES: CPT

## 2025-05-22 PROCEDURE — 97140 MANUAL THERAPY 1/> REGIONS: CPT | Performed by: PHYSICAL THERAPIST

## 2025-05-22 NOTE — PROGRESS NOTES
"Daily Note     Today's date: 2025  Patient name: Marlon Lechuga  : 1950  MRN: [id number]  Referring provider: Kecia Gutierrez MD  Dx:   Encounter Diagnosis     ICD-10-CM    1. Claudication (HCC)  I73.9       2. Spinal stenosis of lumbar region with neurogenic claudication  M48.062       3. Lumbar spondylosis  M47.816           Start Time:   Stop Time:   Total time in clinic (min): 42 minutes    Subjective: Pt reports continued symptoms at his LB.  \"Feeling like an old person\" today.        Objective: See treatment diary below      Assessment: Tolerated treatment well. Continued with program as outlined below.  Continues to respond well to manuals and flexion based stretching program, with some decrease in symptoms by end of treatment.  Patient would benefit from continued PT to further improve mobility, decrease pain, and maximize overall function.        Plan: Continue per plan of care.      Precautions: HTN, hx of back surgery    FOTO   = 50/57    POC exp: 7/10/25    Access Code: [email or web address]  URL: [email or web address]  Date: 2025  Prepared by: Melany Manuel    Exercises  - Lateral Shift Correction at Wall (Mirrored)  - 1 x daily - 7 x weekly - 3 sets - 10 reps  - Thoracic Sidebending with Towel Roll  - 1 x daily - 7 x weekly - 3 sets - 10 reps    Manuals  5/2 5/5 5/8 5/12 5/15 5/22   S/l lumbar opening mob with STM DV bilat DV/AS AS DV DV AS/DV AS DV DV DV   Cupping lumbar parapsinals                          Lumbar flexion mobs  DV/AS AS DV DV AS/DV AS DV DV DV   STM   paraspinals DV DV AS       Neuro Re-Ed                                                                                                        Ther Ex            bike for LE strength and ROM  x15 x15 15' 15' 10' 10' VG L7 10'  Bike  L1 10'   key pose stretch with PT OP  x10 x10  x10 10x 15\" 10x10\" 10\"x10 10\"x10 Self str  10\"x10   Man hip flexor stretch             Seated " "lumbar flexion stretch  x25 15x 10\" x15 x10   x30 x30 x30   Supine hamstring stretch  X3' ea man - DV  3' ea 2' ea DV  DV DV AFB   Cat/camel   Camel flexion only x10 10\" Camel flexion only x10 10\"  Camel flexion only x10 10\" Camel flexion only x25 10\"      Seated Pball press down   x3          Lunge hip flexor stretch   X5 ea          Sidelying QL stretch with bolster      HEP       Standing QL stretch       HEP       Standing Lateral glides - shift correction       x20                   Ther Activity                                       Gait Training                                       Modalities                                                    "

## 2025-05-27 ENCOUNTER — OFFICE VISIT (OUTPATIENT)
Dept: PHYSICAL THERAPY | Facility: REHABILITATION | Age: 75
End: 2025-05-27
Attending: FAMILY MEDICINE
Payer: COMMERCIAL

## 2025-05-27 DIAGNOSIS — M48.062 SPINAL STENOSIS OF LUMBAR REGION WITH NEUROGENIC CLAUDICATION: ICD-10-CM

## 2025-05-27 DIAGNOSIS — I73.9 CLAUDICATION (HCC): Primary | ICD-10-CM

## 2025-05-27 DIAGNOSIS — M47.816 LUMBAR SPONDYLOSIS: ICD-10-CM

## 2025-05-27 PROCEDURE — 97140 MANUAL THERAPY 1/> REGIONS: CPT

## 2025-05-27 PROCEDURE — 97110 THERAPEUTIC EXERCISES: CPT

## 2025-05-27 NOTE — PROGRESS NOTES
"Daily Note     Today's date: 2025  Patient name: Marlon Lechuga  : 1950  MRN: [id number]  Referring provider: Kecia Gutierrez MD  Dx:   Encounter Diagnosis     ICD-10-CM    1. Claudication (HCC)  I73.9       2. Spinal stenosis of lumbar region with neurogenic claudication  M48.062       3. Lumbar spondylosis  M47.816           Start Time: 1533  Stop Time: 1618  Total time in clinic (min): 45 minutes    Subjective: Pt has no new complaints since LV.  Tries to walk with better posture standing straight, but tends to have more pain when walking.        Objective: See treatment diary below      Assessment: Tolerated treatment well. Continued with program as outlined below.  Very stiff with increased soft tissue restriction along L QL and lumbar paraspinals.  Patient would benefit from continued PT to further improve lumbar mobility and reduce pain with daily activities.        Plan: Continue per plan of care.      Precautions: HTN, hx of back surgery    FOTO   = 50/57    POC exp: 7/10/25    Access Code: [email or web address]  URL: [email or web address]  Date: 2025  Prepared by: Melany Manuel    Exercises  - Lateral Shift Correction at Wall (Mirrored)  - 1 x daily - 7 x weekly - 3 sets - 10 reps  - Thoracic Sidebending with Towel Roll  - 1 x daily - 7 x weekly - 3 sets - 10 reps    Manuals  5/2 5/5 5/8 5/12 5/15 5/22   S/l lumbar opening mob with STM MM   DV DV AS/DV AS DV DV DV   Cupping lumbar parapsinals STM + QL  AFB                         Lumbar flexion mobs    DV DV AS/DV AS DV DV DV   STM    DV DV AS       Neuro Re-Ed                                                                                                        Ther Ex            bike for LE strength and ROM Bike  L1 10'   15' 15' 10' 10' VG L7 10'  Bike  L1 10'   key pose stretch with PT OP     x10 10x 15\" 10x10\" 10\"x10 10\"x10 Self str  10\"x10   Man hip flexor stretch             Seated lumbar flexion " "stretch x30   x15 x10   x30 x30 x30   Supine hamstring stretch AFB   3' ea 2' ea DV  DV DV AFB   Cat/camel    Camel flexion only x10 10\"  Camel flexion only x10 10\" Camel flexion only x25 10\"      Seated Pball press down             Lunge hip flexor stretch             Sidelying QL stretch with bolster      HEP       Standing QL stretch       HEP       Standing Lateral glides - shift correction       x20                   Ther Activity                                       Gait Training                                       Modalities                                                      "

## 2025-05-29 ENCOUNTER — EVALUATION (OUTPATIENT)
Dept: PHYSICAL THERAPY | Facility: REHABILITATION | Age: 75
End: 2025-05-29
Attending: FAMILY MEDICINE
Payer: COMMERCIAL

## 2025-05-29 DIAGNOSIS — M48.062 SPINAL STENOSIS OF LUMBAR REGION WITH NEUROGENIC CLAUDICATION: ICD-10-CM

## 2025-05-29 DIAGNOSIS — M47.816 LUMBAR SPONDYLOSIS: ICD-10-CM

## 2025-05-29 DIAGNOSIS — I73.9 CLAUDICATION (HCC): Primary | ICD-10-CM

## 2025-05-29 PROCEDURE — 97110 THERAPEUTIC EXERCISES: CPT | Performed by: PHYSICAL THERAPIST

## 2025-05-29 PROCEDURE — 97140 MANUAL THERAPY 1/> REGIONS: CPT | Performed by: PHYSICAL THERAPIST

## 2025-05-29 NOTE — PROGRESS NOTES
Daily Note     Today's date: 2025  Patient name: Marlon Lechuga  : 1950  MRN: [id number]  Referring provider: Kecia Gutierrez MD  Dx:   Encounter Diagnosis     ICD-10-CM    1. Claudication (HCC)  I73.9       2. Spinal stenosis of lumbar region with neurogenic claudication  M48.062       3. Lumbar spondylosis  M47.816                        Subjective: Doing okay today, continues to feel limited walking and standing.      Objective: See treatment diary below    Lumbar AROM      LE reflexes   - L4 and S1 0+ B/L   - Clonus (-) B/L     (Slump (+) B/L  ERICA/FADIR (-) B/L    MMT WNL B/L LLE     Assessment: Marlon is a 75 y.o. male who has completed skilled PT services 2x/week since re-evaluation. He has made minor objective improvement in ROM and LE MMT. Unfortunately, he continues to be functionally limited in prolonged standing and ambulation. He would benefit from continued skilled PT services to address remaining impairments and functional limitations to encourage return to PLOF.     Functional limitations: Prolonged ambulation, navigating stairs, lifting/carrying/pushing/pulling objects     Short term goals: to be met in 3-4 weeks  Pt independent with initial HEP, rationale, technique and frequency, for ROM and pain control. - MET  Pt will report at least a 25% reduction in subjective pain complaints/symptoms to better manage ADLs and household chores. - Partially met  Improve max pain level by 2 points on the numeric pain rating scale indicating a clinically significant reduction in pain level. - Partially met  Pt will achieve an improvement in FOTO score indicating an improvement in pain and function. - Partially met     Long term goals: to be met in 6-8 weeks  Pt will report a 50% or > reduction in subjective pain complaints/symptoms to better manage ADLs and household chores. - In progress  Pt will improve FOTO score further indicating an overall improvement in pain and function - In  "progress  Pt will be able to perform ADLs, iADLS, and work duties with a max pain level of 5/10 for overall reduction in pain. -  In progress  Pt independent with rationale, technique and plan for performance of advanced HEP to ensure independent self-management of symptoms upon discharge. - In progress    Plan: Continue per plan of care.      Precautions: HTN, hx of back surgery    FOTO  4/17 = 50/57    POC exp: 7/10/25    Access Code: [email or web address]  URL: [email or web address]  Date: 05/05/2025  Prepared by: Melany Manuel    Exercises  - Lateral Shift Correction at Wall (Mirrored)  - 1 x daily - 7 x weekly - 3 sets - 10 reps  - Thoracic Sidebending with Towel Roll  - 1 x daily - 7 x weekly - 3 sets - 10 reps    Manuals 5/27 5/29  4/28 5/2 5/5 5/8 5/12 5/15 5/22   S/l lumbar opening mob with STM MM MM  DV DV AS/DV AS DV DV DV   Cupping lumbar parapsinals STM + QL  AFB MM                        Lumbar flexion mobs    DV DV AS/DV AS DV DV DV   STM    DV DV AS       Neuro Re-Ed                                                                                                        Ther Ex      5/5 5/8      bike for LE strength and ROM Bike  L1 10' L1 10'  15' 15' 10' 10' VG L7 10'  Bike  L1 10'   key pose stretch with PT OP     x10 10x 15\" 10x10\" 10\"x10 10\"x10 Self str  10\"x10   Man hip flexor stretch             Seated lumbar flexion stretch x30 30x  x15 x10   x30 x30 x30   Supine hamstring stretch AFB MM  3' ea 2' ea DV  DV DV AFB   Cat/camel    Camel flexion only x10 10\"  Camel flexion only x10 10\" Camel flexion only x25 10\"      Seated Pball press down             Lunge hip flexor stretch             Sidelying QL stretch with bolster      HEP       Standing QL stretch       HEP       Standing Lateral glides - shift correction       x20                   Ther Activity                                       Gait Training                                       Modalities                                     "

## 2025-06-02 ENCOUNTER — OFFICE VISIT (OUTPATIENT)
Dept: PHYSICAL THERAPY | Facility: REHABILITATION | Age: 75
End: 2025-06-02
Attending: FAMILY MEDICINE
Payer: COMMERCIAL

## 2025-06-02 DIAGNOSIS — M47.816 LUMBAR SPONDYLOSIS: ICD-10-CM

## 2025-06-02 DIAGNOSIS — I73.9 CLAUDICATION (HCC): Primary | ICD-10-CM

## 2025-06-02 DIAGNOSIS — M48.062 SPINAL STENOSIS OF LUMBAR REGION WITH NEUROGENIC CLAUDICATION: ICD-10-CM

## 2025-06-02 PROCEDURE — 97140 MANUAL THERAPY 1/> REGIONS: CPT | Performed by: PHYSICAL THERAPY ASSISTANT

## 2025-06-02 PROCEDURE — 97110 THERAPEUTIC EXERCISES: CPT | Performed by: PHYSICAL THERAPY ASSISTANT

## 2025-06-02 NOTE — PROGRESS NOTES
"Daily Note     Today's date: 2025  Patient name: Marlon Lechuga  : 1950  MRN: [id number]  Referring provider: Kecia Gutierrez MD  Dx:   Encounter Diagnosis     ICD-10-CM    1. Claudication (HCC)  I73.9       2. Spinal stenosis of lumbar region with neurogenic claudication  M48.062       3. Lumbar spondylosis  M47.816                      Subjective: No new complaints. Pt states he is feeling so-so.      Objective: See treatment diary below- Thoracic Sidebending with Towel Roll  - 1 x daily - 7 x weekly - 3 sets - 10 reps     Manuals 5/27  6/2   4/28 5/2 5/5 5/8 5/12 5/15 5/22   S/l lumbar opening mob with STM MM  AB   DV DV AS/DV AS DV DV DV   Cupping lumbar parapsinals STM + QL  AFB  STM+  QL  RK                                           Lumbar flexion mobs       DV DV AS/DV AS DV DV DV   STM       DV DV AS           Neuro Re-Ed                                                                                                                                                                                               Ther Ex                    bike for LE strength and ROM Bike  L1 10'  bike L1 10'   15' 15' 10' 10' VG L7 10'   Bike  L1 10'   key pose stretch with PT OP    10\"x10     x10 10x 15\" 10x10\" 10\"x10 10\"x10 Self str  10\"x10   Man hip flexor stretch                       Seated lumbar flexion stretch x30  x30   x15 x10     x30 x30 x30   Supine hamstring stretch AFB  RK   3' ea 2' ea DV   DV DV AFB   Cat/camel       Camel flexion only x10 10\"   Camel flexion only x10 10\" Camel flexion only x25 10\"         Seated Pball press down                       Lunge hip flexor stretch                       Sidelying QL stretch with bolster           HEP           Standing QL stretch            HEP           Standing Lateral glides - shift correction             x20                                 Ther Activity                                                                       Gait " "Training                                                                       Modalities                                                     Assessment: Tolerated treatment well. Patient exhibited good technique with therapeutic exercises and would benefit from continued PT      Plan: Continue per plan of care.  Progress treatment as tolerated.       Precautions: HTN, hx of back surgery    FOTO  4/17 = 50/57    POC exp: 7/10/25    Access Code: [email or web address]  URL: [email or web address]  Date: 05/05/2025  Prepared by: Melany Manuel    Exercises  - Lateral Shift Correction at Wall (Mirrored)  - 1 x daily - 7 x weekly - 3 sets - 10 reps  - Thoracic Sidebending with Towel Roll  - 1 x daily - 7 x weekly - 3 sets - 10 reps    Manuals 5/27 5/29  4/28 5/2 5/5 5/8 5/12 5/15 5/22   S/l lumbar opening mob with STM MM MM  DV DV AS/DV AS DV DV DV   Cupping lumbar parapsinals STM + QL  AFB MM                        Lumbar flexion mobs    DV DV AS/DV AS DV DV DV   STM    DV DV AS       Neuro Re-Ed                                                                                                        Ther Ex      5/5 5/8      bike for LE strength and ROM Bike  L1 10' L1 10'  15' 15' 10' 10' VG L7 10'  Bike  L1 10'   key pose stretch with PT OP     x10 10x 15\" 10x10\" 10\"x10 10\"x10 Self str  10\"x10   Man hip flexor stretch             Seated lumbar flexion stretch x30 30x  x15 x10   x30 x30 x30   Supine hamstring stretch AFB MM  3' ea 2' ea DV  DV DV AFB   Cat/camel    Camel flexion only x10 10\"  Camel flexion only x10 10\" Camel flexion only x25 10\"      Seated Pball press down             Lunge hip flexor stretch             Sidelying QL stretch with bolster      HEP       Standing QL stretch       HEP       Standing Lateral glides - shift correction       x20                   Ther Activity                                       Gait Training                                       Modalities                                 "

## 2025-08-06 ENCOUNTER — PATIENT MESSAGE (OUTPATIENT)
Dept: FAMILY MEDICINE CLINIC | Facility: CLINIC | Age: 75
End: 2025-08-06

## 2025-08-08 ENCOUNTER — TELEPHONE (OUTPATIENT)
Age: 75
End: 2025-08-08

## 2025-08-11 ENCOUNTER — TELEPHONE (OUTPATIENT)
Age: 75
End: 2025-08-11

## 2025-08-12 ENCOUNTER — OFFICE VISIT (OUTPATIENT)
Dept: FAMILY MEDICINE CLINIC | Facility: CLINIC | Age: 75
End: 2025-08-12
Payer: COMMERCIAL

## 2025-08-14 ENCOUNTER — TELEPHONE (OUTPATIENT)
Age: 75
End: 2025-08-14